# Patient Record
Sex: MALE | Race: WHITE | NOT HISPANIC OR LATINO | URBAN - METROPOLITAN AREA
[De-identification: names, ages, dates, MRNs, and addresses within clinical notes are randomized per-mention and may not be internally consistent; named-entity substitution may affect disease eponyms.]

---

## 2018-07-30 ENCOUNTER — EMERGENCY (EMERGENCY)
Facility: HOSPITAL | Age: 38
LOS: 0 days | Discharge: HOME | End: 2018-07-30
Attending: EMERGENCY MEDICINE | Admitting: EMERGENCY MEDICINE

## 2018-07-30 VITALS
RESPIRATION RATE: 17 BRPM | TEMPERATURE: 98 F | HEART RATE: 75 BPM | WEIGHT: 184.97 LBS | SYSTOLIC BLOOD PRESSURE: 128 MMHG | HEIGHT: 70 IN | OXYGEN SATURATION: 99 % | DIASTOLIC BLOOD PRESSURE: 89 MMHG

## 2018-07-30 DIAGNOSIS — Z09 ENCOUNTER FOR FOLLOW-UP EXAMINATION AFTER COMPLETED TREATMENT FOR CONDITIONS OTHER THAN MALIGNANT NEOPLASM: ICD-10-CM

## 2018-07-30 DIAGNOSIS — Z87.39 PERSONAL HISTORY OF OTHER DISEASES OF THE MUSCULOSKELETAL SYSTEM AND CONNECTIVE TISSUE: Chronic | ICD-10-CM

## 2018-07-30 DIAGNOSIS — Z79.899 OTHER LONG TERM (CURRENT) DRUG THERAPY: ICD-10-CM

## 2018-07-30 DIAGNOSIS — E10.9 TYPE 1 DIABETES MELLITUS WITHOUT COMPLICATIONS: ICD-10-CM

## 2018-07-30 DIAGNOSIS — L03.317 CELLULITIS OF BUTTOCK: ICD-10-CM

## 2018-07-30 RX ORDER — CEPHALEXIN 500 MG
1 CAPSULE ORAL
Qty: 40 | Refills: 0
Start: 2018-07-30 | End: 2018-08-08

## 2018-07-30 RX ORDER — AZTREONAM 2 G
1 VIAL (EA) INJECTION
Qty: 20 | Refills: 0
Start: 2018-07-30 | End: 2018-08-08

## 2018-07-30 NOTE — ED PROVIDER NOTE - OBJECTIVE STATEMENT
38 y/o M with PMH DM type 1 with insulin pump presents with redness/swelling/warmth/TTP  x days on R upper buttock at previous area of insulin pump insertion. Denies discharge, CP, SOB, abdominal pain, n/v/d, fevers, sweats, chills, trauma, fall, cough, recent travel, recent illness, sick contacts, leg pain/swelling, urinary symptoms. BG has been same as previous and there has been no break in his insulin use.

## 2018-07-30 NOTE — ED PROVIDER NOTE - ATTENDING CONTRIBUTION TO CARE
38 y/o M with PMH DM type 1 with insulin pump presents with redness/swelling/warmth/TTP  x days on R upper buttock. Patient states he had this after he kept his insulin pump inserted at the same area for a few days, no fever, no cp/sob, no n/v/d, no loc.     CONSTITUTIONAL: Well-developed; well-nourished; in no acute distress. Sitting up and providing appropriate history and physical examination  SKIN: + right superior buttocks 2 cm area of cellulitis, no specific abscess noted on bedside US, no fluctuance, + hard to the touch,  skin exam is warm and dry, no acute rash.  HEAD: Normocephalic; atraumatic.  EXT: Normal ROM. No clubbing, cyanosis or edema. Dp and Pt Pulses intact. Cap refill less than 3 seconds  NEURO: Alert, oriented, grossly unremarkable. No Focal deficits. GCS 15. NIH 0  PSYCH: Cooperative, appropriate.

## 2018-07-30 NOTE — ED PROVIDER NOTE - PROGRESS NOTE DETAILS
Counseled on red flags and to return for them. Counseled on importance of follow up. Patient repeats back instructions. Patient advised that they or their doctor may call [phone] to follow up on the specific results of the tests performed today in the emergency department.   Patient appears well on discharge.

## 2018-07-30 NOTE — ED PROVIDER NOTE - PHYSICAL EXAMINATION
PHYSICAL EXAM:    GENERAL: Alert, appears stated age, well appearing, non-toxic  SKIN: Warm, pink and dry. MMM. +R upper buttock with 2cm area of indurated erythema, swelling, warmth. no fluctuance. no discharge. no punctum. bedside sono reveals cobblestoning without discrete focal collection.   EYE: Normal lids/conjunctiva  ENT: Normal hearing, patent oropharynx  NECK: +supple. No meningismus  Pulm: Bilateral BS, normal resp effort, no wheezes, stridor, or retractions  CV: RRR, no M/R/G, 2+ pulses  Abd: soft, non-tender, non-distended  Neuro: AAOx3, . normal gait.

## 2018-07-30 NOTE — ED PROVIDER NOTE - NS ED ROS FT
Review of Systems    Constitutional: (-) fever  Cardiovascular: (-) chest pain, (-) syncope  Respiratory: (-) cough, (-) shortness of breath  Gastrointestinal: (-) vomiting, (-) diarrhea  Musculoskeletal: (-) neck pain, (-) back pain  Integumentary: see hpi   Neurological: (-) headache

## 2019-12-07 ENCOUNTER — INPATIENT (INPATIENT)
Facility: HOSPITAL | Age: 39
LOS: 5 days | Discharge: HOME HEALTH | End: 2019-12-13
Attending: INTERNAL MEDICINE | Admitting: INTERNAL MEDICINE
Payer: COMMERCIAL

## 2019-12-07 VITALS
DIASTOLIC BLOOD PRESSURE: 68 MMHG | HEART RATE: 137 BPM | SYSTOLIC BLOOD PRESSURE: 119 MMHG | TEMPERATURE: 102 F | OXYGEN SATURATION: 94 % | RESPIRATION RATE: 19 BRPM

## 2019-12-07 DIAGNOSIS — Z87.39 PERSONAL HISTORY OF OTHER DISEASES OF THE MUSCULOSKELETAL SYSTEM AND CONNECTIVE TISSUE: Chronic | ICD-10-CM

## 2019-12-07 PROBLEM — E10.9 TYPE 1 DIABETES MELLITUS WITHOUT COMPLICATIONS: Chronic | Status: ACTIVE | Noted: 2018-07-30

## 2019-12-07 LAB
ALBUMIN SERPL ELPH-MCNC: 3.9 G/DL — SIGNIFICANT CHANGE UP (ref 3.5–5.2)
ALP SERPL-CCNC: 66 U/L — SIGNIFICANT CHANGE UP (ref 30–115)
ALT FLD-CCNC: 28 U/L — SIGNIFICANT CHANGE UP (ref 0–41)
ANION GAP SERPL CALC-SCNC: 22 MMOL/L — HIGH (ref 7–14)
APPEARANCE UR: CLEAR — SIGNIFICANT CHANGE UP
APTT BLD: 31.3 SEC — SIGNIFICANT CHANGE UP (ref 27–39.2)
AST SERPL-CCNC: 25 U/L — SIGNIFICANT CHANGE UP (ref 0–41)
BACTERIA # UR AUTO: NEGATIVE — SIGNIFICANT CHANGE UP
BASE EXCESS BLDV CALC-SCNC: -0.8 MMOL/L — SIGNIFICANT CHANGE UP (ref -2–2)
BASOPHILS # BLD AUTO: 0.09 K/UL — SIGNIFICANT CHANGE UP (ref 0–0.2)
BASOPHILS NFR BLD AUTO: 0.5 % — SIGNIFICANT CHANGE UP (ref 0–1)
BILIRUB SERPL-MCNC: 1.5 MG/DL — HIGH (ref 0.2–1.2)
BILIRUB UR-MCNC: NEGATIVE — SIGNIFICANT CHANGE UP
BUN SERPL-MCNC: 24 MG/DL — HIGH (ref 10–20)
CA-I SERPL-SCNC: 1.18 MMOL/L — SIGNIFICANT CHANGE UP (ref 1.12–1.3)
CALCIUM SERPL-MCNC: 9.3 MG/DL — SIGNIFICANT CHANGE UP (ref 8.5–10.1)
CHLORIDE SERPL-SCNC: 96 MMOL/L — LOW (ref 98–110)
CO2 SERPL-SCNC: 17 MMOL/L — SIGNIFICANT CHANGE UP (ref 17–32)
COLOR SPEC: YELLOW — SIGNIFICANT CHANGE UP
CREAT SERPL-MCNC: 0.9 MG/DL — SIGNIFICANT CHANGE UP (ref 0.7–1.5)
DIFF PNL FLD: NEGATIVE — SIGNIFICANT CHANGE UP
EOSINOPHIL # BLD AUTO: 0 K/UL — SIGNIFICANT CHANGE UP (ref 0–0.7)
EOSINOPHIL NFR BLD AUTO: 0 % — SIGNIFICANT CHANGE UP (ref 0–8)
EPI CELLS # UR: 1 /HPF — SIGNIFICANT CHANGE UP (ref 0–5)
FLU A RESULT: NEGATIVE — SIGNIFICANT CHANGE UP
FLU A RESULT: NEGATIVE — SIGNIFICANT CHANGE UP
FLUAV AG NPH QL: NEGATIVE — SIGNIFICANT CHANGE UP
FLUBV AG NPH QL: NEGATIVE — SIGNIFICANT CHANGE UP
GAS PNL BLDV: 134 MMOL/L — LOW (ref 136–145)
GAS PNL BLDV: SIGNIFICANT CHANGE UP
GLUCOSE BLDC GLUCOMTR-MCNC: 218 MG/DL — HIGH (ref 70–99)
GLUCOSE BLDC GLUCOMTR-MCNC: 219 MG/DL — HIGH (ref 70–99)
GLUCOSE BLDC GLUCOMTR-MCNC: 257 MG/DL — HIGH (ref 70–99)
GLUCOSE SERPL-MCNC: 307 MG/DL — HIGH (ref 70–99)
GLUCOSE UR QL: ABNORMAL
HCO3 BLDV-SCNC: 22 MMOL/L — SIGNIFICANT CHANGE UP (ref 22–29)
HCT VFR BLD CALC: 42.1 % — SIGNIFICANT CHANGE UP (ref 42–52)
HCT VFR BLDA CALC: 48 % — HIGH (ref 34–44)
HGB BLD CALC-MCNC: 15.7 G/DL — SIGNIFICANT CHANGE UP (ref 14–18)
HGB BLD-MCNC: 14.9 G/DL — SIGNIFICANT CHANGE UP (ref 14–18)
HYALINE CASTS # UR AUTO: 1 /LPF — SIGNIFICANT CHANGE UP (ref 0–7)
IMM GRANULOCYTES NFR BLD AUTO: 0.8 % — HIGH (ref 0.1–0.3)
INR BLD: 1.39 RATIO — HIGH (ref 0.65–1.3)
KETONES UR-MCNC: ABNORMAL
LACTATE BLDV-MCNC: 1.8 MMOL/L — HIGH (ref 0.5–1.6)
LACTATE SERPL-SCNC: 1.3 MMOL/L — SIGNIFICANT CHANGE UP (ref 0.7–2)
LACTATE SERPL-SCNC: 1.8 MMOL/L — SIGNIFICANT CHANGE UP (ref 0.7–2)
LEUKOCYTE ESTERASE UR-ACNC: NEGATIVE — SIGNIFICANT CHANGE UP
LYMPHOCYTES # BLD AUTO: 0.77 K/UL — LOW (ref 1.2–3.4)
LYMPHOCYTES # BLD AUTO: 4 % — LOW (ref 20.5–51.1)
MCHC RBC-ENTMCNC: 29.6 PG — SIGNIFICANT CHANGE UP (ref 27–31)
MCHC RBC-ENTMCNC: 35.4 G/DL — SIGNIFICANT CHANGE UP (ref 32–37)
MCV RBC AUTO: 83.7 FL — SIGNIFICANT CHANGE UP (ref 80–94)
MONOCYTES # BLD AUTO: 1.16 K/UL — HIGH (ref 0.1–0.6)
MONOCYTES NFR BLD AUTO: 6 % — SIGNIFICANT CHANGE UP (ref 1.7–9.3)
NEUTROPHILS # BLD AUTO: 17.05 K/UL — HIGH (ref 1.4–6.5)
NEUTROPHILS NFR BLD AUTO: 88.7 % — HIGH (ref 42.2–75.2)
NITRITE UR-MCNC: NEGATIVE — SIGNIFICANT CHANGE UP
NRBC # BLD: 0 /100 WBCS — SIGNIFICANT CHANGE UP (ref 0–0)
PCO2 BLDV: 31 MMHG — LOW (ref 41–51)
PH BLDV: 7.46 — HIGH (ref 7.26–7.43)
PH UR: 6 — SIGNIFICANT CHANGE UP (ref 5–8)
PLATELET # BLD AUTO: 249 K/UL — SIGNIFICANT CHANGE UP (ref 130–400)
PO2 BLDV: 43 MMHG — HIGH (ref 20–40)
POTASSIUM BLDV-SCNC: 4 MMOL/L — SIGNIFICANT CHANGE UP (ref 3.3–5.6)
POTASSIUM SERPL-MCNC: 4.6 MMOL/L — SIGNIFICANT CHANGE UP (ref 3.5–5)
POTASSIUM SERPL-SCNC: 4.6 MMOL/L — SIGNIFICANT CHANGE UP (ref 3.5–5)
PROT SERPL-MCNC: 6.7 G/DL — SIGNIFICANT CHANGE UP (ref 6–8)
PROT UR-MCNC: ABNORMAL
PROTHROM AB SERPL-ACNC: 15.9 SEC — HIGH (ref 9.95–12.87)
RBC # BLD: 5.03 M/UL — SIGNIFICANT CHANGE UP (ref 4.7–6.1)
RBC # FLD: 12.7 % — SIGNIFICANT CHANGE UP (ref 11.5–14.5)
RBC CASTS # UR COMP ASSIST: 1 /HPF — SIGNIFICANT CHANGE UP (ref 0–4)
RSV RESULT: NEGATIVE — SIGNIFICANT CHANGE UP
RSV RNA RESP QL NAA+PROBE: NEGATIVE — SIGNIFICANT CHANGE UP
SAO2 % BLDV: 82 % — SIGNIFICANT CHANGE UP
SODIUM SERPL-SCNC: 135 MMOL/L — SIGNIFICANT CHANGE UP (ref 135–146)
SP GR SPEC: 1.04 — HIGH (ref 1.01–1.02)
UROBILINOGEN FLD QL: SIGNIFICANT CHANGE UP
WBC # BLD: 19.22 K/UL — HIGH (ref 4.8–10.8)
WBC # FLD AUTO: 19.22 K/UL — HIGH (ref 4.8–10.8)
WBC UR QL: 2 /HPF — SIGNIFICANT CHANGE UP (ref 0–5)

## 2019-12-07 PROCEDURE — 99285 EMERGENCY DEPT VISIT HI MDM: CPT

## 2019-12-07 PROCEDURE — 71046 X-RAY EXAM CHEST 2 VIEWS: CPT | Mod: 26

## 2019-12-07 PROCEDURE — 93010 ELECTROCARDIOGRAM REPORT: CPT

## 2019-12-07 RX ORDER — KETOROLAC TROMETHAMINE 30 MG/ML
15 SYRINGE (ML) INJECTION ONCE
Refills: 0 | Status: DISCONTINUED | OUTPATIENT
Start: 2019-12-07 | End: 2019-12-07

## 2019-12-07 RX ORDER — ACETAMINOPHEN 500 MG
650 TABLET ORAL EVERY 6 HOURS
Refills: 0 | Status: DISCONTINUED | OUTPATIENT
Start: 2019-12-07 | End: 2019-12-10

## 2019-12-07 RX ORDER — AZITHROMYCIN 500 MG/1
500 TABLET, FILM COATED ORAL ONCE
Refills: 0 | Status: COMPLETED | OUTPATIENT
Start: 2019-12-07 | End: 2019-12-07

## 2019-12-07 RX ORDER — AZITHROMYCIN 500 MG/1
500 TABLET, FILM COATED ORAL EVERY 24 HOURS
Refills: 0 | Status: DISCONTINUED | OUTPATIENT
Start: 2019-12-07 | End: 2019-12-09

## 2019-12-07 RX ORDER — CEFEPIME 1 G/1
2000 INJECTION, POWDER, FOR SOLUTION INTRAMUSCULAR; INTRAVENOUS ONCE
Refills: 0 | Status: COMPLETED | OUTPATIENT
Start: 2019-12-07 | End: 2019-12-07

## 2019-12-07 RX ORDER — ATORVASTATIN CALCIUM 80 MG/1
10 TABLET, FILM COATED ORAL AT BEDTIME
Refills: 0 | Status: DISCONTINUED | OUTPATIENT
Start: 2019-12-07 | End: 2019-12-13

## 2019-12-07 RX ORDER — SODIUM CHLORIDE 9 MG/ML
3000 INJECTION, SOLUTION INTRAVENOUS ONCE
Refills: 0 | Status: COMPLETED | OUTPATIENT
Start: 2019-12-07 | End: 2019-12-07

## 2019-12-07 RX ORDER — ACETAMINOPHEN 500 MG
650 TABLET ORAL ONCE
Refills: 0 | Status: DISCONTINUED | OUTPATIENT
Start: 2019-12-07 | End: 2019-12-07

## 2019-12-07 RX ORDER — ENOXAPARIN SODIUM 100 MG/ML
40 INJECTION SUBCUTANEOUS DAILY
Refills: 0 | Status: DISCONTINUED | OUTPATIENT
Start: 2019-12-07 | End: 2019-12-13

## 2019-12-07 RX ORDER — CEFTRIAXONE 500 MG/1
1000 INJECTION, POWDER, FOR SOLUTION INTRAMUSCULAR; INTRAVENOUS EVERY 24 HOURS
Refills: 0 | Status: DISCONTINUED | OUTPATIENT
Start: 2019-12-07 | End: 2019-12-09

## 2019-12-07 RX ORDER — IBUPROFEN 200 MG
400 TABLET ORAL ONCE
Refills: 0 | Status: COMPLETED | OUTPATIENT
Start: 2019-12-07 | End: 2019-12-08

## 2019-12-07 RX ADMIN — AZITHROMYCIN 255 MILLIGRAM(S): 500 TABLET, FILM COATED ORAL at 11:15

## 2019-12-07 RX ADMIN — Medication 650 MILLIGRAM(S): at 23:52

## 2019-12-07 RX ADMIN — CEFEPIME 100 MILLIGRAM(S): 1 INJECTION, POWDER, FOR SOLUTION INTRAMUSCULAR; INTRAVENOUS at 12:03

## 2019-12-07 RX ADMIN — Medication 15 MILLIGRAM(S): at 13:19

## 2019-12-07 RX ADMIN — Medication 650 MILLIGRAM(S): at 22:39

## 2019-12-07 RX ADMIN — SODIUM CHLORIDE 3000 MILLILITER(S): 9 INJECTION, SOLUTION INTRAVENOUS at 13:47

## 2019-12-07 RX ADMIN — CEFTRIAXONE 100 MILLIGRAM(S): 500 INJECTION, POWDER, FOR SOLUTION INTRAMUSCULAR; INTRAVENOUS at 21:38

## 2019-12-07 RX ADMIN — SODIUM CHLORIDE 3000 MILLILITER(S): 9 INJECTION, SOLUTION INTRAVENOUS at 11:15

## 2019-12-07 RX ADMIN — CEFEPIME 2000 MILLIGRAM(S): 1 INJECTION, POWDER, FOR SOLUTION INTRAMUSCULAR; INTRAVENOUS at 13:04

## 2019-12-07 RX ADMIN — AZITHROMYCIN 500 MILLIGRAM(S): 500 TABLET, FILM COATED ORAL at 12:00

## 2019-12-07 NOTE — ED PROVIDER NOTE - OBJECTIVE STATEMENT
39 y.o. M with PMH of DM type 1 on insulin with fever tmax of 102, 2 weeks of cough shortness of breath not improved after levaquin has worsened since then. 39 y.o. M with PMH of DM type 1 on insulin with fever tmax of 102, 2 weeks of non productive cough without shortness of breath not improved after levaquin has worsened since then. no NVD no abdominal pain, no urinary symptoms, no CP. Pt was in urgent care today and they sent him in for further workup.

## 2019-12-07 NOTE — H&P ADULT - ASSESSMENT
40 y/o m w/ pmhx of type 1 dm, hld presents with ~ 3 weeks of productive cough and fever. Pt started to get productive cough with clear to yellow sputum, associated with intermittent fever about 3 weeks ago and went to urgent care on 11/23 where he was given Levaquin and steroids. Pt condition improved after that for a few days but then he started to get similar symptoms again so he went to urgent care again today and he was told that he has pneumonia and he should come to ER.    # Fever with cough and elevated WBCs likely PNA  - CXR did not show clear opacity  - check UA, pan cultures,   - consider pulm eval and CT scan if no improvement  - s/p IV fluid, Cefepime and Azithromycin. Start Rocephin and c/w azithro for 5 days  - f/u urine strep and legionella Ag. Sputum Cx    # DM Type 1  - c/w insulin basal/bolus and correctional scale.  - monitor finger stick AC+ HS.    # DLD  - c/w statins    DVT PPx:  Lovenox 40 mg s/c  GI PPX:  not indicated  Diet: DASH/Carb consistent  Activity: Increase as tolerated  Dispo: from home, no needs  Code Status: full code 38 y/o m w/ pmhx of type 1 dm, hld presents with ~ 3 weeks of productive cough and fever. Pt started to get productive cough with clear to yellow sputum, associated with intermittent fever about 3 weeks ago and went to urgent care on 11/23 where he was given Levaquin and steroids. Pt condition improved after that for a few days but then he started to get similar symptoms again so he went to urgent care again today and he was told that he has pneumonia and he should come to ER.    # Fever with productive cough and elevated WBCs likely PNA  - CXR did not show clear opacity  - check UA, pan cultures,   - consider pulm eval and CT scan if no improvement  - s/p IV fluid, Cefepime and Azithromycin. Start Rocephin and c/w Azithro for 5 days  - f/u urine strep and legionella Ag. Sputum Cx  - consider hem/onc consult if no improvement in WBCs, as pt has weight loss.  - will check for HIV.    # Sinus tachycardia likely from fever  - check TSH as pt has weight loss.     # DM Type 1  - Pt has insulin pump and wants to continue using it.  - monitor finger stick AC+ HS.    # DLD  - c/w statins    DVT PPx:  Lovenox 40 mg s/c  GI PPX:  not indicated  Diet: DASH/Carb consistent  Activity: Increase as tolerated  Dispo: from home, no needs  Code Status: full code

## 2019-12-07 NOTE — H&P ADULT - NSHPLABSRESULTS_GEN_ALL_CORE
14.9   19.22 )-----------( 249      ( 07 Dec 2019 10:52 )             42.1       12-07    135  |  96<L>  |  24<H>  ----------------------------<  307<H>  4.6   |  17  |  0.9    Ca    9.3      07 Dec 2019 10:52    TPro  6.7  /  Alb  3.9  /  TBili  1.5<H>  /  DBili  x   /  AST  25  /  ALT  28  /  AlkPhos  66  12-07                  PT/INR - ( 07 Dec 2019 10:52 )   PT: 15.90 sec;   INR: 1.39 ratio         PTT - ( 07 Dec 2019 10:52 )  PTT:31.3 sec    Lactate Trend  12-07 @ 10:52 Lactate:1.8             CAPILLARY BLOOD GLUCOSE            < from: Xray Chest 2 Views PA/Lat (12.07.19 @ 11:34) >    Impression:    Support devices: None.    Cardiac/mediastinum/hilum: Unremarkable.    Lung parenchyma/Pleura: Retrocardiac linear opacity, likely atelectasis.   Right lung is unremarkable. No evidence of pneumothorax.    Skeleton/soft tissues: Stable    < end of copied text >

## 2019-12-07 NOTE — ED PROVIDER NOTE - PHYSICAL EXAMINATION
CONSTITUTIONAL: Well-developed; well-nourished; in no acute distress.   SKIN: warm, dry  HEAD: Normocephalic; atraumatic.  EYES: PERRL, EOMI, no conjunctival erythema  ENT: No nasal discharge; airway clear.  NECK: Supple; non tender.  CARD: S1, S2 normal; no murmurs, gallops, or rubs. Regular rate and rhythm.   RESP: No wheezes, + LLL rales and rhonchi.  ABD: soft ntnd  EXT: Normal ROM.  No clubbing, cyanosis or edema.   LYMPH: No acute cervical adenopathy.  NEURO: Alert, oriented, grossly unremarkable  PSYCH: Cooperative, appropriate.

## 2019-12-07 NOTE — ED ADULT NURSE NOTE - NSFALLRSKASSESSTYPE_ED_ALL_ED
Measures to control urgency and frequency:    1. avoiding bladder irritants  - Discussed bladder irritants include coffe (even decaf), tea, alcohol, soda, spicy foods, acidic juices (orange, tomato), vinegar, and artificial sweeteners/sugary beverages.    2. timed voiding - go by the clock even if dont feel like you have to (every 2 hours maximum if even making it that long - otherwise try 90 minutes, and increase interval as tolerated), as well as when you need to go     3. No postponing voiding - dont hold it! Go when the need comes on    4. No fluids 2 hours before bed    5. Urinate just before bed     Continue myrbetriq    Bowel regimen - improve - goal: soft daily BM without pushin  - miralax daily - continue  - prunes - continue eating daily  CAN ADD any or all of  - stool softener capsule - 1-2x/day  - metamucil - 1 dose per day (or other fiber supplement)       Initial (On Arrival)

## 2019-12-07 NOTE — H&P ADULT - NSHPPHYSICALEXAM_GEN_ALL_CORE
PHYSICAL EXAM:  GENERAL: NAD, lying in bed comfortably  HEAD:  Atraumatic, Normocephalic  EYES: EOMI, PERRLA, conjunctiva and sclera clear  ENT: Moist mucous membranes  NECK: Supple, No JVD  CHEST/LUNG: Clear to auscultation bilaterally; No rales, rhonchi, wheezing, or rubs. Unlabored respirations  HEART: Regular rate and rhythm; No murmurs, rubs, or gallops  ABDOMEN: Bowel sounds present; Soft, Nontender, Nondistended. No hepatomegaly  EXTREMITIES:  2+ Peripheral Pulses, brisk capillary refill. No clubbing, cyanosis, or edema  NERVOUS SYSTEM:  Alert & Oriented X3, speech clear. No deficits   MSK: FROM all 4 extremities, full and equal strength  SKIN: No rashes or lesions PHYSICAL EXAM:  GENERAL: NAD, lying in bed comfortably  HEAD:  Atraumatic, Normocephalic  EYES: EOMI, PERRLA, conjunctiva and sclera clear  ENT: Moist mucous membranes  NECK: Supple, No JVD  CHEST/LUNG: Clear to auscultation bilaterally; No rales, rhonchi, wheezing, or rubs. Unlabored respirations  HEART: Regular rate and rhythm; No murmurs, rubs, or gallops  ABDOMEN: Bowel sounds present; Soft, Nontender, Nondistended. No hepatomegaly  EXTREMITIES:  + Peripheral Pulses, brisk capillary refill. No clubbing, cyanosis, or edema  NERVOUS SYSTEM:  Alert & Oriented X3, speech clear. No deficits   MSK: FROM all 4 extremities, full and equal strength  SKIN: No rashes or lesions

## 2019-12-07 NOTE — H&P ADULT - NSHPSOCIALHISTORY_GEN_ALL_CORE
denied hx of smoking cigarettes, drinks alcohol socially once or twice a week. Denied hx of recreational drug use.

## 2019-12-07 NOTE — H&P ADULT - HISTORY OF PRESENT ILLNESS
38 y/o m w/ pmhx of type 1 dm, hld presents with ~ 3 weeks of productive cough and fever. Pt started to get productive cough with clear to yellow sputum, associated with intermittent fever about 3 weeks ago and went to urgent care on 11/23 where he was given Levaquin and steroids. Pt condition improved after that for a few days but then he started to get similar symptoms again so he went to urgent care again today and he was told that he has pneumonia and he should come to ER. Pt states that he has productive cough associated with generalized body aches and fever. The rest of his family members also have cough but his symptoms are worse. Pt denied any hx of fever, chills, nausea, vomiting, diarrhea, constipation, melena, pain in abdomen, sob, chest pain, increased urinary frequency, dysuria, headache, lightheadedness, dizziness, vertigo, localized weakness or numbness/tingling.    In the ER pt was found to have fever with T max of 102 F, HR of 137, /68 mmHg, RR : 19, SPO2 94% on RA. 38 y/o m w/ pmhx of type 1 dm, hld presents with ~ 3 weeks of productive cough and fever. Pt started to get productive cough with clear to yellow sputum, associated with intermittent fever about 3 weeks ago and went to urgent care on 11/23 where he was given Levaquin and steroids. Pt condition improved after that for a few days but then he started to get similar symptoms again so he went to urgent care again today and he was told that he has pneumonia and he should come to ER. Pt states that he has productive cough associated with generalized body aches and fever. The rest of his family members also have cough but his symptoms are worse. Pt has lost about 7 pounds in the last few weeks. Pt denied any hx of fever, chills, nausea, vomiting, diarrhea, constipation, melena, pain in abdomen, sob, chest pain, increased urinary frequency, dysuria, headache, lightheadedness, dizziness, vertigo, localized weakness or numbness/tingling.    In the ER pt was found to have fever with T max of 102 F, HR of 137, /68 mmHg, RR : 19, SPO2 94% on RA.

## 2019-12-07 NOTE — ED PROVIDER NOTE - ATTENDING CONTRIBUTION TO CARE
38 y/o m w/ pmhx of dm, hld presents with ~ 3 weeks of productive cough, clear to yellow, associated with intermittent fever, tmax of 102, chills, nausea, post-tussive vomiting, no hemoptysis, and sob. (+) sick contacts at home with cough but not as severe as him. pt went to urgent care on 11/23 and was given Levaquin and steroids, felt better for a little then symptoms returned, went back today and was told he had PNA and to come to ed. never obtained flu shot. ex-smoker. No cp,  pleuritic cp,  palpitations, diaphoresis,  ha/lh/dizziness, numbness/tingling, neck pain/ stiffness, abd pain, diarrhea, constipation, melena/brbpr, urinary symptoms, trauma, weakness, edema, calf pain/swelling/erythema,recent travel or rash.  Vital Signs: I have reviewed the initial vital signs. Constitutional: Male pt sitting on stretcher speaking full sentences with dry raspy cough. Integumentary: No rash. ENT: Dry MM NECK: Supple, non-tender, no meningeal signs. Cardiovascular: Tachycardiac, radial pulses 2/4 b/l. No JVD. Respiratory: (+) Congested. BS present b/l, decreased breath sounds to lower lung bases worse to R lower lung base, poor resp effort and excursion, poor air exchange, no accessory muscle use, no stridor. Gastrointestinal: BS present throughout all 4 quadrants, soft, nd, nt, no rebound tenderness or guarding, no cvat. Musculoskeletal: FROM, no edema, no calf pain/swelling/erythema. Neurologic: AAOx3, motor 5/5 and sensation intact throughout upper and lower ext, CN II-XII intact, No facial droop or slurring of speech. No focal deficits.

## 2019-12-07 NOTE — ED PROVIDER NOTE - CLINICAL SUMMARY MEDICAL DECISION MAKING FREE TEXT BOX
pt aware of all labs and imaging, code sepsis, ivf and abx given s/p cultures obtained, cxr with PNA, aware of plan for admission, medical admitting team aware of pt and admission.

## 2019-12-07 NOTE — ED PROVIDER NOTE - CARE PLAN
Assessment and plan of treatment:	Plan: CODE SEPSIS, EKG, CXR, labs, ivf, abx, urine, reassess. Principal Discharge DX:	Sepsis  Assessment and plan of treatment:	Plan: CODE SEPSIS, EKG, CXR, labs, ivf, abx, urine, reassess.  Secondary Diagnosis:	Pneumonia

## 2019-12-07 NOTE — ED PROVIDER NOTE - PROGRESS NOTE DETAILS
Code sepsis suspected at this time 30 cc/kg fluids IVABx pt aware of all labs and imaging, PNA noted, iv abx were given, no resp distress, aware of plan for admission and agrees.

## 2019-12-07 NOTE — ED ADULT TRIAGE NOTE - CHIEF COMPLAINT QUOTE
c/o fever, productive cough, recent dx of pna x 1 week ago, finished PO abx and prenisone, symptoms returned

## 2019-12-08 LAB
ALBUMIN SERPL ELPH-MCNC: 3.3 G/DL — LOW (ref 3.5–5.2)
ALP SERPL-CCNC: 59 U/L — SIGNIFICANT CHANGE UP (ref 30–115)
ALT FLD-CCNC: 19 U/L — SIGNIFICANT CHANGE UP (ref 0–41)
ANION GAP SERPL CALC-SCNC: 12 MMOL/L — SIGNIFICANT CHANGE UP (ref 7–14)
AST SERPL-CCNC: 15 U/L — SIGNIFICANT CHANGE UP (ref 0–41)
BASOPHILS # BLD AUTO: 0.07 K/UL — SIGNIFICANT CHANGE UP (ref 0–0.2)
BASOPHILS NFR BLD AUTO: 0.5 % — SIGNIFICANT CHANGE UP (ref 0–1)
BILIRUB SERPL-MCNC: 0.8 MG/DL — SIGNIFICANT CHANGE UP (ref 0.2–1.2)
BUN SERPL-MCNC: 17 MG/DL — SIGNIFICANT CHANGE UP (ref 10–20)
CALCIUM SERPL-MCNC: 8.7 MG/DL — SIGNIFICANT CHANGE UP (ref 8.5–10.1)
CHLORIDE SERPL-SCNC: 104 MMOL/L — SIGNIFICANT CHANGE UP (ref 98–110)
CO2 SERPL-SCNC: 26 MMOL/L — SIGNIFICANT CHANGE UP (ref 17–32)
CREAT SERPL-MCNC: 0.9 MG/DL — SIGNIFICANT CHANGE UP (ref 0.7–1.5)
EOSINOPHIL # BLD AUTO: 0.04 K/UL — SIGNIFICANT CHANGE UP (ref 0–0.7)
EOSINOPHIL NFR BLD AUTO: 0.3 % — SIGNIFICANT CHANGE UP (ref 0–8)
GLUCOSE BLDC GLUCOMTR-MCNC: 120 MG/DL — HIGH (ref 70–99)
GLUCOSE BLDC GLUCOMTR-MCNC: 188 MG/DL — HIGH (ref 70–99)
GLUCOSE BLDC GLUCOMTR-MCNC: 199 MG/DL — HIGH (ref 70–99)
GLUCOSE BLDC GLUCOMTR-MCNC: 206 MG/DL — HIGH (ref 70–99)
GLUCOSE SERPL-MCNC: 134 MG/DL — HIGH (ref 70–99)
GRAM STN FLD: SIGNIFICANT CHANGE UP
HCT VFR BLD CALC: 37.5 % — LOW (ref 42–52)
HGB BLD-MCNC: 12.9 G/DL — LOW (ref 14–18)
HIV 1+2 AB+HIV1 P24 AG SERPL QL IA: SIGNIFICANT CHANGE UP
IMM GRANULOCYTES NFR BLD AUTO: 1.1 % — HIGH (ref 0.1–0.3)
LEGIONELLA AG UR QL: NEGATIVE — SIGNIFICANT CHANGE UP
LYMPHOCYTES # BLD AUTO: 1.71 K/UL — SIGNIFICANT CHANGE UP (ref 1.2–3.4)
LYMPHOCYTES # BLD AUTO: 12.3 % — LOW (ref 20.5–51.1)
MAGNESIUM SERPL-MCNC: 1.8 MG/DL — SIGNIFICANT CHANGE UP (ref 1.8–2.4)
MCHC RBC-ENTMCNC: 29.3 PG — SIGNIFICANT CHANGE UP (ref 27–31)
MCHC RBC-ENTMCNC: 34.4 G/DL — SIGNIFICANT CHANGE UP (ref 32–37)
MCV RBC AUTO: 85 FL — SIGNIFICANT CHANGE UP (ref 80–94)
MONOCYTES # BLD AUTO: 1.22 K/UL — HIGH (ref 0.1–0.6)
MONOCYTES NFR BLD AUTO: 8.8 % — SIGNIFICANT CHANGE UP (ref 1.7–9.3)
NEUTROPHILS # BLD AUTO: 10.72 K/UL — HIGH (ref 1.4–6.5)
NEUTROPHILS NFR BLD AUTO: 77 % — HIGH (ref 42.2–75.2)
NRBC # BLD: 0 /100 WBCS — SIGNIFICANT CHANGE UP (ref 0–0)
PLATELET # BLD AUTO: 214 K/UL — SIGNIFICANT CHANGE UP (ref 130–400)
POTASSIUM SERPL-MCNC: 4.5 MMOL/L — SIGNIFICANT CHANGE UP (ref 3.5–5)
POTASSIUM SERPL-SCNC: 4.5 MMOL/L — SIGNIFICANT CHANGE UP (ref 3.5–5)
PROT SERPL-MCNC: 5.7 G/DL — LOW (ref 6–8)
RBC # BLD: 4.41 M/UL — LOW (ref 4.7–6.1)
RBC # FLD: 12.6 % — SIGNIFICANT CHANGE UP (ref 11.5–14.5)
SODIUM SERPL-SCNC: 142 MMOL/L — SIGNIFICANT CHANGE UP (ref 135–146)
SPECIMEN SOURCE: SIGNIFICANT CHANGE UP
TSH SERPL-MCNC: 0.3 UIU/ML — SIGNIFICANT CHANGE UP (ref 0.27–4.2)
WBC # BLD: 13.92 K/UL — HIGH (ref 4.8–10.8)
WBC # FLD AUTO: 13.92 K/UL — HIGH (ref 4.8–10.8)

## 2019-12-08 RX ORDER — BENZOCAINE AND MENTHOL 5; 1 G/100ML; G/100ML
1 LIQUID ORAL
Refills: 0 | Status: DISCONTINUED | OUTPATIENT
Start: 2019-12-08 | End: 2019-12-13

## 2019-12-08 RX ORDER — IBUPROFEN 200 MG
600 TABLET ORAL ONCE
Refills: 0 | Status: COMPLETED | OUTPATIENT
Start: 2019-12-08 | End: 2019-12-08

## 2019-12-08 RX ADMIN — Medication 100 MILLIGRAM(S): at 18:32

## 2019-12-08 RX ADMIN — Medication 100 MILLIGRAM(S): at 10:34

## 2019-12-08 RX ADMIN — ATORVASTATIN CALCIUM 10 MILLIGRAM(S): 80 TABLET, FILM COATED ORAL at 06:04

## 2019-12-08 RX ADMIN — Medication 650 MILLIGRAM(S): at 18:45

## 2019-12-08 RX ADMIN — BENZOCAINE AND MENTHOL 1 LOZENGE: 5; 1 LIQUID ORAL at 18:31

## 2019-12-08 RX ADMIN — Medication 400 MILLIGRAM(S): at 00:00

## 2019-12-08 RX ADMIN — AZITHROMYCIN 255 MILLIGRAM(S): 500 TABLET, FILM COATED ORAL at 11:40

## 2019-12-08 RX ADMIN — CEFTRIAXONE 100 MILLIGRAM(S): 500 INJECTION, POWDER, FOR SOLUTION INTRAMUSCULAR; INTRAVENOUS at 21:07

## 2019-12-08 RX ADMIN — Medication 400 MILLIGRAM(S): at 02:00

## 2019-12-08 RX ADMIN — Medication 100 MILLIGRAM(S): at 00:00

## 2019-12-08 RX ADMIN — Medication 600 MILLIGRAM(S): at 11:03

## 2019-12-08 NOTE — PROGRESS NOTE ADULT - ASSESSMENT
Prior Authorization Retail Medication Request    Medication/Dose:   ICD code (if different than what is on RX):  MIGRAINE HEAD ACHE  Previously Tried and Failed: SEE CHART Insurance Name: [employer]  Insurance ID: [id number]        Pharmacy Information (if different than what is on RX)  Name:  [facility]  Phone:  [phone]     38 y/o m w/ pmhx of type 1 dm, hld presents with ~ 3 weeks of productive cough and fever. Pt started to get productive cough with clear to yellow sputum, associated with intermittent fever about 3 weeks ago and went to urgent care on 11/23 where he was given Levaquin and steroids. Pt condition improved after that for a few days but then he started to get similar symptoms again so he went to urgent care again today and he was told that he has pneumonia and he should come to ER.    # Sepsis Fever with productive cough and elevated WBCs likely PNA  - CXR did not show clear opacity  - check UA, pan cultures,   - pulm eval and ID  - s/p IV fluid, Cefepime and Azithromycin. Start Rocephin and c/w Azithro for 5 days  - f/u urine strep and legionella Ag. Sputum Cx  - consider hem/onc consult if no improvement in WBCs, as pt has weight loss.  - will check for HIV.    # Diabetes Mellitus     - monitor blood sugar   - try to keep under 200

## 2019-12-08 NOTE — PROGRESS NOTE ADULT - SUBJECTIVE AND OBJECTIVE BOX
40 y/o m w/ pmhx of type 1 dm, hld presents with ~ 3 weeks of productive cough and fever. Pt started to get productive cough with clear to yellow sputum, associated with intermittent fever about 3 weeks ago and went to urgent care on 11/23 where he was given Levaquin and steroids. Pt condition improved after that for a few days but then he started to get similar symptoms again so he went to urgent care again today and he was told that he has pneumonia and he should come to ER. Pt states that he has productive cough associated with generalized body aches and fever. The rest of his family members also have cough but his symptoms are worse. Pt has lost about 7 pounds in the last few weeks. Pt denied any hx of fever, chills, nausea, vomiting, diarrhea, constipation, melena, pain in abdomen, sob, chest pain, increased urinary frequency, dysuria, headache, lightheadedness, dizziness, vertigo, localized weakness or numbness/tingling.    In the ER pt was found to have fever with T max of 102 F, HR of 137, /68 mmHg, RR : 19, SPO2 94% on RA.     PAST MEDICAL & SURGICAL HISTORY:  Diabetes, type I  History of trigger finger    MEDICATIONS  (STANDING):  atorvastatin 10 milliGRAM(s) Oral at bedtime  azithromycin  IVPB 500 milliGRAM(s) IV Intermittent every 24 hours  cefTRIAXone   IVPB 1000 milliGRAM(s) IV Intermittent every 24 hours  enoxaparin Injectable 40 milliGRAM(s) SubCutaneous daily    MEDICATIONS  (PRN):  acetaminophen   Tablet .. 650 milliGRAM(s) Oral every 6 hours PRN Temp greater or equal to 38C (100.4F), Mild Pain (1 - 3)  guaiFENesin   Syrup  (Sugar-Free) 100 milliGRAM(s) Oral every 6 hours PRN Cough    Vital Signs Last 24 Hrs  T(C): 36.4 (08 Dec 2019 04:38), Max: 39.4 (07 Dec 2019 23:52)  T(F): 97.6 (08 Dec 2019 04:38), Max: 103 (07 Dec 2019 23:52)  HR: 94 (08 Dec 2019 04:38) (94 - 137)  BP: 96/54 (08 Dec 2019 04:38) (96/54 - 132/74)  BP(mean): --  RR: 18 (08 Dec 2019 04:38) (18 - 19)  SpO2: 96% (08 Dec 2019 03:07) (91% - 99%)    CAPILLARY BLOOD GLUCOSE      POCT Blood Glucose.: 218 mg/dL (07 Dec 2019 21:23)  POCT Blood Glucose.: 257 mg/dL (07 Dec 2019 17:31)  POCT Blood Glucose.: 219 mg/dL (07 Dec 2019 14:49)     PHYSICAL EXAM:  	GENERAL: NAD, lying in bed comfortably  	HEAD:  Atraumatic, Normocephalic  	EYES: EOMI, PERRLA, conjunctiva and sclera clear  	ENT: Moist mucous membranes  	NECK: Supple, No JVD  	CHEST/LUNG: Clear to auscultation bilaterally; No rales, rhonchi, wheezing, or rubs. Unlabored respirations  	HEART: Regular rate and rhythm; No murmurs, rubs, or gallops  	ABDOMEN: Bowel sounds present; Soft, Nontender, Nondistended. No hepatomegaly  	EXTREMITIES:  + Peripheral Pulses, brisk capillary refill. No clubbing, cyanosis, or edema  	NERVOUS SYSTEM:  Alert & Oriented X3, speech clear. No deficits   	MSK: FROM all 4 extremities, full and equal strength  SKIN: No rashes or lesions                          14.9   19.22 )-----------( 249      ( 07 Dec 2019 10:52 )             42.1   12-07    135  |  96<L>  |  24<H>  ----------------------------<  307<H>  4.6   |  17  |  0.9    Ca    9.3      07 Dec 2019 10:52    TPro  6.7  /  Alb  3.9  /  TBili  1.5<H>  /  DBili  x   /  AST  25  /  ALT  28  /  AlkPhos  66  12-07      EXAM:  XR CHEST PA LAT 2V            PROCEDURE DATE:  12/07/2019            INTERPRETATION:  Clinical History / Reason for exam: Fever    Comparison : Chest radiograph 5/6/2010.    Technique/Positioning: Frontal and lateral views.    Impression:    Support devices: None.    Cardiac/mediastinum/hilum: Unremarkable.    Lung parenchyma/Pleura: Retrocardiac linear opacity, likely atelectasis.   Right lung is unremarkable. No evidence of pneumothorax.    Skeleton/soft tissues: Stable                    VIJAY COVARRUBIAS M.D., ATTENDING RADIOLOGIST  This document has been electronically signed. Dec  7 2019  1:22PM

## 2019-12-09 LAB
ANION GAP SERPL CALC-SCNC: 17 MMOL/L — HIGH (ref 7–14)
BUN SERPL-MCNC: 13 MG/DL — SIGNIFICANT CHANGE UP (ref 10–20)
CALCIUM SERPL-MCNC: 8.5 MG/DL — SIGNIFICANT CHANGE UP (ref 8.5–10.1)
CHLORIDE SERPL-SCNC: 100 MMOL/L — SIGNIFICANT CHANGE UP (ref 98–110)
CO2 SERPL-SCNC: 24 MMOL/L — SIGNIFICANT CHANGE UP (ref 17–32)
CREAT SERPL-MCNC: 0.9 MG/DL — SIGNIFICANT CHANGE UP (ref 0.7–1.5)
CULTURE RESULTS: NO GROWTH — SIGNIFICANT CHANGE UP
CULTURE RESULTS: SIGNIFICANT CHANGE UP
GLUCOSE BLDC GLUCOMTR-MCNC: 176 MG/DL — HIGH (ref 70–99)
GLUCOSE BLDC GLUCOMTR-MCNC: 181 MG/DL — HIGH (ref 70–99)
GLUCOSE BLDC GLUCOMTR-MCNC: 216 MG/DL — HIGH (ref 70–99)
GLUCOSE BLDC GLUCOMTR-MCNC: 257 MG/DL — HIGH (ref 70–99)
GLUCOSE SERPL-MCNC: 171 MG/DL — HIGH (ref 70–99)
HCT VFR BLD CALC: 37.4 % — LOW (ref 42–52)
HGB BLD-MCNC: 12.8 G/DL — LOW (ref 14–18)
MCHC RBC-ENTMCNC: 29 PG — SIGNIFICANT CHANGE UP (ref 27–31)
MCHC RBC-ENTMCNC: 34.2 G/DL — SIGNIFICANT CHANGE UP (ref 32–37)
MCV RBC AUTO: 84.8 FL — SIGNIFICANT CHANGE UP (ref 80–94)
NRBC # BLD: 0 /100 WBCS — SIGNIFICANT CHANGE UP (ref 0–0)
PLATELET # BLD AUTO: 245 K/UL — SIGNIFICANT CHANGE UP (ref 130–400)
POTASSIUM SERPL-MCNC: 4.2 MMOL/L — SIGNIFICANT CHANGE UP (ref 3.5–5)
POTASSIUM SERPL-SCNC: 4.2 MMOL/L — SIGNIFICANT CHANGE UP (ref 3.5–5)
RBC # BLD: 4.41 M/UL — LOW (ref 4.7–6.1)
RBC # FLD: 12.6 % — SIGNIFICANT CHANGE UP (ref 11.5–14.5)
S PNEUM AG UR QL: NEGATIVE — SIGNIFICANT CHANGE UP
SODIUM SERPL-SCNC: 141 MMOL/L — SIGNIFICANT CHANGE UP (ref 135–146)
SPECIMEN SOURCE: SIGNIFICANT CHANGE UP
SPECIMEN SOURCE: SIGNIFICANT CHANGE UP
TSH SERPL-MCNC: 1.7 UIU/ML — SIGNIFICANT CHANGE UP (ref 0.27–4.2)
WBC # BLD: 10.85 K/UL — HIGH (ref 4.8–10.8)
WBC # FLD AUTO: 10.85 K/UL — HIGH (ref 4.8–10.8)

## 2019-12-09 PROCEDURE — 71250 CT THORAX DX C-: CPT | Mod: 26

## 2019-12-09 RX ORDER — VANCOMYCIN HCL 1 G
1750 VIAL (EA) INTRAVENOUS EVERY 12 HOURS
Refills: 0 | Status: DISCONTINUED | OUTPATIENT
Start: 2019-12-10 | End: 2019-12-10

## 2019-12-09 RX ORDER — ALBUTEROL 90 UG/1
1 AEROSOL, METERED ORAL EVERY 4 HOURS
Refills: 0 | Status: DISCONTINUED | OUTPATIENT
Start: 2019-12-09 | End: 2019-12-13

## 2019-12-09 RX ORDER — VANCOMYCIN HCL 1 G
1750 VIAL (EA) INTRAVENOUS ONCE
Refills: 0 | Status: COMPLETED | OUTPATIENT
Start: 2019-12-09 | End: 2019-12-09

## 2019-12-09 RX ORDER — CHLORHEXIDINE GLUCONATE 213 G/1000ML
1 SOLUTION TOPICAL
Refills: 0 | Status: DISCONTINUED | OUTPATIENT
Start: 2019-12-09 | End: 2019-12-13

## 2019-12-09 RX ORDER — TIOTROPIUM BROMIDE 18 UG/1
1 CAPSULE ORAL; RESPIRATORY (INHALATION) DAILY
Refills: 0 | Status: DISCONTINUED | OUTPATIENT
Start: 2019-12-09 | End: 2019-12-13

## 2019-12-09 RX ORDER — VANCOMYCIN HCL 1 G
VIAL (EA) INTRAVENOUS
Refills: 0 | Status: DISCONTINUED | OUTPATIENT
Start: 2019-12-09 | End: 2019-12-10

## 2019-12-09 RX ORDER — MEROPENEM 1 G/30ML
1000 INJECTION INTRAVENOUS EVERY 8 HOURS
Refills: 0 | Status: DISCONTINUED | OUTPATIENT
Start: 2019-12-09 | End: 2019-12-10

## 2019-12-09 RX ORDER — IPRATROPIUM/ALBUTEROL SULFATE 18-103MCG
3 AEROSOL WITH ADAPTER (GRAM) INHALATION EVERY 6 HOURS
Refills: 0 | Status: DISCONTINUED | OUTPATIENT
Start: 2019-12-09 | End: 2019-12-13

## 2019-12-09 RX ORDER — IPRATROPIUM/ALBUTEROL SULFATE 18-103MCG
3 AEROSOL WITH ADAPTER (GRAM) INHALATION ONCE
Refills: 0 | Status: COMPLETED | OUTPATIENT
Start: 2019-12-09 | End: 2019-12-09

## 2019-12-09 RX ADMIN — Medication 100 MILLIGRAM(S): at 22:10

## 2019-12-09 RX ADMIN — BENZOCAINE AND MENTHOL 1 LOZENGE: 5; 1 LIQUID ORAL at 18:00

## 2019-12-09 RX ADMIN — Medication 650 MILLIGRAM(S): at 12:30

## 2019-12-09 RX ADMIN — AZITHROMYCIN 255 MILLIGRAM(S): 500 TABLET, FILM COATED ORAL at 12:31

## 2019-12-09 RX ADMIN — Medication 3 MILLILITER(S): at 20:30

## 2019-12-09 RX ADMIN — Medication 100 MILLIGRAM(S): at 10:36

## 2019-12-09 RX ADMIN — BENZOCAINE AND MENTHOL 1 LOZENGE: 5; 1 LIQUID ORAL at 06:08

## 2019-12-09 RX ADMIN — ATORVASTATIN CALCIUM 10 MILLIGRAM(S): 80 TABLET, FILM COATED ORAL at 06:07

## 2019-12-09 RX ADMIN — Medication 100 MILLIGRAM(S): at 01:02

## 2019-12-09 RX ADMIN — Medication 650 MILLIGRAM(S): at 20:32

## 2019-12-09 RX ADMIN — Medication 3 MILLILITER(S): at 09:24

## 2019-12-09 RX ADMIN — MEROPENEM 100 MILLIGRAM(S): 1 INJECTION INTRAVENOUS at 21:56

## 2019-12-09 RX ADMIN — Medication 650 MILLIGRAM(S): at 20:02

## 2019-12-09 RX ADMIN — Medication 650 MILLIGRAM(S): at 13:00

## 2019-12-09 RX ADMIN — Medication 250 MILLIGRAM(S): at 17:59

## 2019-12-09 RX ADMIN — Medication 3 MILLILITER(S): at 04:50

## 2019-12-09 NOTE — CONSULT NOTE ADULT - SUBJECTIVE AND OBJECTIVE BOX
JENNY, LIZ  39y, Male  Allergy: No Known Allergies      CHIEF COMPLAINT: fever with productive cough (08 Dec 2019 05:50)      HPI:  40yo male with a past medical history of DMI on insulin and HLD presents to ED with a chief complaint of intermittent fever and productive cough (clear/yellow phlegm) for the past 3 weeks. Pt reports Tmax of 103 with generalized body aches, fever, chills, nausea and vomiting. Pt was seen at urgent care on  and was prescribed 1 week of Levaquin and 5 days of steroids. Pt reports temporary improvement in fever but worsening of cough. Pt was at urgent care again on  and was told to get further workup at ED after being diagnosed with pneumonia.  Pt reports his daughters are currently sick, coughing but not in any severe condition. Denies SOB, CP or abdominal pain. Currently feels better, continues to have productive cough but denies any chills or fever at this time.     Sepsis initiated in ED    Infectious Diseases History:  Old Micro:    FAMILY HISTORY:    PAST MEDICAL & SURGICAL HISTORY:  Diabetes, type I  History of trigger finger      SOCIAL HISTORY    Substance Use (  ) never used  (  ) IVDU (  ) Other:  Tobacco Usage:  (   ) never smoked   ( - ) former smoker   (   ) current smoker   Alcohol Usage: (   ) social  (   ) daily use (   ) denies  Sexual History:       ROS  General: Denies rigors, admits to fever, chills  HEENT: Denies headache, rhinorrhea, sore throat, eye pain  CV: Denies CP,  PULM: Denies wheezing, hemoptysis  GI: Denies hematemesis, hematochezia, melena  : Denies discharge, hematuria  MSK: Denies arthralgias, myalgias  SKIN: Denies rash, lesions  NEURO: Denies paresthesias, weakness  PSYCH: Denies depression, anxiety    VITALS:  T(F): 99.9, Max: 102.1 (19 @ 10:45)  HR: 101  BP: 130/76  RR: 20Vital Signs Last 24 Hrs  T(C): 37.7 (09 Dec 2019 04:20), Max: 38.9 (08 Dec 2019 10:45)  T(F): 99.9 (09 Dec 2019 04:20), Max: 102.1 (08 Dec 2019 10:45)  HR: 101 (09 Dec 2019 04:20) (101 - 110)  BP: 130/76 (09 Dec 2019 04:20) (123/77 - 130/76)  BP(mean): --  RR: 20 (09 Dec 2019 04:20) (16 - 20)  SpO2: 96% (09 Dec 2019 04:20) (92% - 96%)    PHYSICAL EXAM:  Gen: NAD, resting in bed  HEENT: Normocephalic, atraumatic  Neck: supple, no lymphadenopathy  CV: Regular rate & regular rhythm  Lungs: No wheezing, no rales, Congested, decreased breath sounds b/l of lower lung bases  Abdomen: Soft, BS present  Ext: Warm, well perfused  Neuro: non focal, awake  Skin: no rash, no lesions    TESTS & MEASUREMENTS:                        12.8   10.85 )-----------( 245      ( 09 Dec 2019 06:40 )             37.4         141  |  100  |  13  ----------------------------<  171<H>  4.2   |  24  |  0.9    Ca    8.5      09 Dec 2019 06:40  Mg     1.8         TPro  5.7<L>  /  Alb  3.3<L>  /  TBili  0.8  /  DBili  x   /  AST  15  /  ALT  19  /  AlkPhos  59      eGFR if Non African American: 108 mL/min/1.73M2 (19 @ 06:40)  eGFR if African American: 125 mL/min/1.73M2 (19 @ 06:40)    LIVER FUNCTIONS - ( 08 Dec 2019 07:17 )  Alb: 3.3 g/dL / Pro: 5.7 g/dL / ALK PHOS: 59 U/L / ALT: 19 U/L / AST: 15 U/L / GGT: x           Urinalysis Basic - ( 07 Dec 2019 16:40 )    Color: Yellow / Appearance: Clear / S.043 / pH: x  Gluc: x / Ketone: Large  / Bili: Negative / Urobili: <2 mg/dL   Blood: x / Protein: 30 mg/dL / Nitrite: Negative   Leuk Esterase: Negative / RBC: 1 /HPF / WBC 2 /HPF   Sq Epi: x / Non Sq Epi: 1 /HPF / Bacteria: Negative        Culture - Urine (collected 19 @ 16:40)  Source: .Urine Clean Catch (Midstream)  Final Report (19 @ 07:22):    No growth    Culture - Blood (collected 19 @ 16:21)  Source: .Blood None  Preliminary Report (19 @ 01:02):    No growth to date.    Culture - Blood (collected 19 @ 16:21)  Source: .Blood None  Preliminary Report (19 @ 01:02):    No growth to date.    Culture - Sputum (collected 19 @ 16:15)  Source: .Sputum Sputum  Gram Stain (19 @ 06:49):    Norovirus GI/GII polymorphonuclear leukocytes per low power field    ***Add Quantity Code*** Squamous epithelial cells per low power field    Moderate Gram positive cocci in pairs seen per oil power field    Moderate Gram Negative Rods seen per oil powerfield  Preliminary Report (19 @ 18:49):    Normal Respiratory Smitha present    Culture - Blood (collected 19 @ 10:52)  Source: .Blood Blood-Peripheral  Preliminary Report (19 @ 19:01):    No growth to date.    Culture - Blood (collected 19 @ 10:52)  Source: .Blood Blood-Peripheral  Preliminary Report (19 @ 19:01):    No growth to date.        Lactate, Blood: 1.3 mmol/L (19 @ 14:00)  Blood Gas Venous - Lactate: 1.8 mmoL/L (19 @ 11:16)  Lactate, Blood: 1.8 mmol/L (19 @ 10:52)      INFECTIOUS DISEASES TESTING  Legionella Antigen, Urine: Negative (19 @ 16:40)  HIV-1/2 Combo Result: Nonreact (19 @ 16:21)      RADIOLOGY & ADDITIONAL TESTS:  I have personally reviewed the last Chest xray    CXR  Xray Chest 2 Views PA/Lat:   EXAM:  XR CHEST PA LAT 2V          PROCEDURE DATE:  2019      INTERPRETATION:  Clinical History / Reason for exam: Fever    Comparison : Chest radiograph 2010.    Technique/Positioning: Frontal and lateral views.    Impression:    Support devices: None.    Cardiac/mediastinum/hilum: Unremarkable.    Lung parenchyma/Pleura: Retrocardiac linear opacity, likely atelectasis.   Right lung is unremarkable. No evidence of pneumothorax.    Skeleton/soft tissues: Stable            VIJAY COVARRUBIAS M.D., ATTENDING RADIOLOGIST  This document has been electronically signed. Dec  7 2019  1:22PM     (19 @ 11:34)      CT      CARDIOLOGY TESTING  12 Lead ECG:   Ventricular Rate 126 BPM    Atrial Rate 126 BPM    P-R Interval 150 ms    QRS Duration 88 ms    Q-T Interval 292 ms    QTC Calculation(Bezet) 422 ms    P Axis 48 degrees    R Axis 43 degrees    T Axis 45 degrees    Diagnosis Line Sinus tachycardia  Otherwise normal ECG    Confirmed by ESTELLA HERNANDEZ MD ([id number]) on 2019 1:52:13 PM (19 @ 11:23)      All available historical records have been reviewed    MEDICATIONS  ALBUTerol    90 MICROgram(s) HFA Inhaler 1  albuterol/ipratropium for Nebulization 3  atorvastatin 10  azithromycin  IVPB 500  benzocaine 15 mG/menthol 3.6 mG (Sugar-Free) Lozenge 1  cefTRIAXone   IVPB 1000  enoxaparin Injectable 40  tiotropium 18 MICROgram(s) Capsule 1      ANTIBIOTICS:  azithromycin  IVPB 500 milliGRAM(s) IV Intermittent every 24 hours  cefTRIAXone   IVPB 1000 milliGRAM(s) IV Intermittent every 24 hours      All available historical data has been reviewed JENNY LIZ  39y, Male  Allergy: No Known Allergies      CHIEF COMPLAINT: fever with productive cough (08 Dec 2019 05:50)      HPI:  40yo male with a past medical history of DMI on insulin and HLD presents to ED with a chief complaint of intermittent fever and productive cough (clear/yellow phlegm) for the past 3 weeks. Pt reports Tmax of 103 with generalized body aches, fever, chills, nausea and vomiting. Pt was seen at urgent care on  and was prescribed 1 week of Levaquin and 5 days of steroids. Pt reports temporary improvement in fever but worsening of cough. Pt was at urgent care again on  and was told to get further workup at ED after being diagnosed with pneumonia.  Pt reports his daughters are currently sick, coughing but not in any severe condition. Denies any recent travel. Denies SOB, CP or abdominal pain. Currently feels better, continues to have productive cough but denies any chills or nausea at this time.    Sepsis initiated in ED    Infectious Diseases History:  Old Micro:    FAMILY HISTORY:    PAST MEDICAL & SURGICAL HISTORY:  Diabetes, type I  History of trigger finger      SOCIAL HISTORY    Substance Use (  ) never used  (  ) IVDU (  ) Other:  Tobacco Usage:  (   ) never smoked   ( - ) former smoker   (   ) current smoker   Alcohol Usage: (   ) social  (   ) daily use (   ) denies  Sexual History:       ROS  General: Denies rigors, admits to fever, chills  HEENT: Denies headache, rhinorrhea, sore throat, eye pain  CV: Denies CP,  PULM: Denies wheezing, hemoptysis  GI: Denies hematemesis, hematochezia, melena  : Denies discharge, hematuria  MSK: Denies arthralgias, myalgias  SKIN: Denies rash, lesions  NEURO: Denies paresthesias, weakness  PSYCH: Denies depression, anxiety    VITALS:  T(F): 99.9, Max: 102.1 (19 @ 10:45)  HR: 101  BP: 130/76  RR: 20Vital Signs Last 24 Hrs  T(C): 37.7 (09 Dec 2019 04:20), Max: 38.9 (08 Dec 2019 10:45)  T(F): 99.9 (09 Dec 2019 04:20), Max: 102.1 (08 Dec 2019 10:45)  HR: 101 (09 Dec 2019 04:20) (101 - 110)  BP: 130/76 (09 Dec 2019 04:20) (123/77 - 130/76)  BP(mean): --  RR: 20 (09 Dec 2019 04:20) (16 - 20)  SpO2: 96% (09 Dec 2019 04:20) (92% - 96%)    PHYSICAL EXAM:  Gen: NAD, resting in bed  HEENT: Normocephalic, atraumatic  Neck: supple, no lymphadenopathy  CV: Regular rate & regular rhythm  Lungs: No wheezing, no rales, Congested, decreased breath sounds b/l of lower lung bases  Abdomen: Soft, BS present  Ext: Warm, well perfused  Neuro: non focal, awake  Skin: no rash, no lesions    TESTS & MEASUREMENTS:                        12.8   10.85 )-----------( 245      ( 09 Dec 2019 06:40 )             37.4         141  |  100  |  13  ----------------------------<  171<H>  4.2   |  24  |  0.9    Ca    8.5      09 Dec 2019 06:40  Mg     1.8         TPro  5.7<L>  /  Alb  3.3<L>  /  TBili  0.8  /  DBili  x   /  AST  15  /  ALT  19  /  AlkPhos  59  12    eGFR if Non African American: 108 mL/min/1.73M2 (19 @ 06:40)  eGFR if African American: 125 mL/min/1.73M2 (19 @ 06:40)    LIVER FUNCTIONS - ( 08 Dec 2019 07:17 )  Alb: 3.3 g/dL / Pro: 5.7 g/dL / ALK PHOS: 59 U/L / ALT: 19 U/L / AST: 15 U/L / GGT: x           Urinalysis Basic - ( 07 Dec 2019 16:40 )    Color: Yellow / Appearance: Clear / S.043 / pH: x  Gluc: x / Ketone: Large  / Bili: Negative / Urobili: <2 mg/dL   Blood: x / Protein: 30 mg/dL / Nitrite: Negative   Leuk Esterase: Negative / RBC: 1 /HPF / WBC 2 /HPF   Sq Epi: x / Non Sq Epi: 1 /HPF / Bacteria: Negative        Culture - Urine (collected 12-07-19 @ 16:40)  Source: .Urine Clean Catch (Midstream)  Final Report (19 @ 07:22):    No growth    Culture - Blood (collected 19 @ 16:21)  Source: .Blood None  Preliminary Report (19 @ 01:02):    No growth to date.    Culture - Blood (collected 19 @ 16:21)  Source: .Blood None  Preliminary Report (19 @ 01:02):    No growth to date.    Culture - Sputum (collected 19 @ 16:15)  Source: .Sputum Sputum  Gram Stain (19 @ 06:49):    Norovirus GI/GII polymorphonuclear leukocytes per low power field    ***Add Quantity Code*** Squamous epithelial cells per low power field    Moderate Gram positive cocci in pairs seen per oil power field    Moderate Gram Negative Rods seen per oil powerfield  Preliminary Report (19 @ 18:49):    Normal Respiratory Smitha present    Culture - Blood (collected 19 @ 10:52)  Source: .Blood Blood-Peripheral  Preliminary Report (19 @ 19:01):    No growth to date.    Culture - Blood (collected 19 @ 10:52)  Source: .Blood Blood-Peripheral  Preliminary Report (19 @ 19:01):    No growth to date.        Lactate, Blood: 1.3 mmol/L (19 @ 14:00)  Blood Gas Venous - Lactate: 1.8 mmoL/L (19 @ 11:16)  Lactate, Blood: 1.8 mmol/L (19 @ 10:52)      INFECTIOUS DISEASES TESTING  Legionella Antigen, Urine: Negative (19 @ 16:40)  HIV-1/2 Combo Result: Nonreact (19 @ 16:21)      RADIOLOGY & ADDITIONAL TESTS:  I have personally reviewed the last Chest xray    CXR  Xray Chest 2 Views PA/Lat:   EXAM:  XR CHEST PA LAT 2V          PROCEDURE DATE:  2019      INTERPRETATION:  Clinical History / Reason for exam: Fever    Comparison : Chest radiograph 2010.    Technique/Positioning: Frontal and lateral views.    Impression:    Support devices: None.    Cardiac/mediastinum/hilum: Unremarkable.    Lung parenchyma/Pleura: Retrocardiac linear opacity, likely atelectasis.   Right lung is unremarkable. No evidence of pneumothorax.    Skeleton/soft tissues: Stable            VIJAY COVARRUBIAS M.D., ATTENDING RADIOLOGIST  This document has been electronically signed. Dec  7 2019  1:22PM     (19 @ 11:34)      CT      CARDIOLOGY TESTING  12 Lead ECG:   Ventricular Rate 126 BPM    Atrial Rate 126 BPM    P-R Interval 150 ms    QRS Duration 88 ms    Q-T Interval 292 ms    QTC Calculation(Bezet) 422 ms    P Axis 48 degrees    R Axis 43 degrees    T Axis 45 degrees    Diagnosis Line Sinus tachycardia  Otherwise normal ECG    Confirmed by ESTELLA HERNANDEZ MD ([id number]) on 2019 1:52:13 PM (19 @ 11:23)      All available historical records have been reviewed    MEDICATIONS  ALBUTerol    90 MICROgram(s) HFA Inhaler 1  albuterol/ipratropium for Nebulization 3  atorvastatin 10  azithromycin  IVPB 500  benzocaine 15 mG/menthol 3.6 mG (Sugar-Free) Lozenge 1  cefTRIAXone   IVPB 1000  enoxaparin Injectable 40  tiotropium 18 MICROgram(s) Capsule 1      ANTIBIOTICS:  azithromycin  IVPB 500 milliGRAM(s) IV Intermittent every 24 hours  cefTRIAXone   IVPB 1000 milliGRAM(s) IV Intermittent every 24 hours      All available historical data has been reviewed JENNY LIZ  39y, Male  Allergy: No Known Allergies      CHIEF COMPLAINT: fever with productive cough (08 Dec 2019 05:50)      HPI:  40yo male with a past medical history of DMI on insulin and HLD presents to ED with a chief complaint of intermittent fever and productive cough (clear/yellow phlegm) for the past 3 weeks. Pt reports Tmax of 103 with generalized body aches, fever, chills, nausea and vomiting. Pt was seen at urgent care on  and was prescribed 1 week of Levaquin and 5 days of steroids. Pt reports temporary improvement in fever but worsening of cough. Pt was at urgent care again on  and was told to get further workup at ED after being diagnosed with pneumonia.  Pt reports his daughters are currently sick, coughing but not in any severe condition. Denies any recent travel. Denies SOB, CP or abdominal pain. Currently feels better, continues to have productive cough but denies any chills or nausea at this time.    Sepsis initiated in ED    Infectious Diseases History:  Old Micro:    FAMILY HISTORY:    PAST MEDICAL & SURGICAL HISTORY:  Diabetes, type I  History of trigger finger      SOCIAL HISTORY    Substance Use (  ) never used  (  ) IVDU (  ) Other:  Tobacco Usage:  (   ) never smoked   ( - ) former smoker   (   ) current smoker   Alcohol Usage: (   ) social  (   ) daily use (  x ) denies  Sexual History: na      ROS  General: Denies rigors, admits to fever, chills  HEENT: Denies headache, rhinorrhea, sore throat, eye pain  CV: Denies CP,  PULM: Denies wheezing, hemoptysis  GI: Denies hematemesis, hematochezia, melena  : Denies discharge, hematuria  MSK: Denies arthralgias, myalgias  SKIN: Denies rash, lesions  NEURO: Denies paresthesias, weakness  PSYCH: Denies depression, anxiety    VITALS:  T(F): 99.9, Max: 102.1 (19 @ 10:45)  HR: 101  BP: 130/76  RR: 20Vital Signs Last 24 Hrs  T(C): 37.7 (09 Dec 2019 04:20), Max: 38.9 (08 Dec 2019 10:45)  T(F): 99.9 (09 Dec 2019 04:20), Max: 102.1 (08 Dec 2019 10:45)  HR: 101 (09 Dec 2019 04:20) (101 - 110)  BP: 130/76 (09 Dec 2019 04:20) (123/77 - 130/76)  BP(mean): --  RR: 20 (09 Dec 2019 04:20) (16 - 20)  SpO2: 96% (09 Dec 2019 04:20) (92% - 96%)    PHYSICAL EXAM:  Gen: NAD, resting in bed  HEENT: Normocephalic, atraumatic  Neck: supple, no lymphadenopathy  CV: Regular rate & regular rhythm  Lungs: No wheezing, no rales, Congested, decreased breath sounds b/l of lower lung bases  Abdomen: Soft, BS present  Ext: Warm, well perfused  Neuro: non focal, awake  Skin: no rash, no lesions    TESTS & MEASUREMENTS:                        12.8   10.85 )-----------( 245      ( 09 Dec 2019 06:40 )             37.4         141  |  100  |  13  ----------------------------<  171<H>  4.2   |  24  |  0.9    Ca    8.5      09 Dec 2019 06:40  Mg     1.8     12    TPro  5.7<L>  /  Alb  3.3<L>  /  TBili  0.8  /  DBili  x   /  AST  15  /  ALT  19  /  AlkPhos  59  12    eGFR if Non African American: 108 mL/min/1.73M2 (19 @ 06:40)  eGFR if African American: 125 mL/min/1.73M2 (19 @ 06:40)    LIVER FUNCTIONS - ( 08 Dec 2019 07:17 )  Alb: 3.3 g/dL / Pro: 5.7 g/dL / ALK PHOS: 59 U/L / ALT: 19 U/L / AST: 15 U/L / GGT: x           Urinalysis Basic - ( 07 Dec 2019 16:40 )    Color: Yellow / Appearance: Clear / S.043 / pH: x  Gluc: x / Ketone: Large  / Bili: Negative / Urobili: <2 mg/dL   Blood: x / Protein: 30 mg/dL / Nitrite: Negative   Leuk Esterase: Negative / RBC: 1 /HPF / WBC 2 /HPF   Sq Epi: x / Non Sq Epi: 1 /HPF / Bacteria: Negative        Culture - Urine (collected 19 @ 16:40)  Source: .Urine Clean Catch (Midstream)  Final Report (19 @ 07:22):    No growth    Culture - Blood (collected 19 @ 16:21)  Source: .Blood None  Preliminary Report (19 @ 01:02):    No growth to date.    Culture - Blood (collected 19 @ 16:21)  Source: .Blood None  Preliminary Report (19 @ 01:02):    No growth to date.    Culture - Sputum (collected 19 @ 16:15)  Source: .Sputum Sputum  Gram Stain (19 @ 06:49):    Norovirus GI/GII polymorphonuclear leukocytes per low power field    ***Add Quantity Code*** Squamous epithelial cells per low power field    Moderate Gram positive cocci in pairs seen per oil power field    Moderate Gram Negative Rods seen per oil powerfield  Preliminary Report (19 @ 18:49):    Normal Respiratory Smitha present    Culture - Blood (collected 19 @ 10:52)  Source: .Blood Blood-Peripheral  Preliminary Report (19 @ 19:01):    No growth to date.    Culture - Blood (collected 19 @ 10:52)  Source: .Blood Blood-Peripheral  Preliminary Report (19 @ 19:01):    No growth to date.        Lactate, Blood: 1.3 mmol/L (19 @ 14:00)  Blood Gas Venous - Lactate: 1.8 mmoL/L (19 @ 11:16)  Lactate, Blood: 1.8 mmol/L (19 @ 10:52)      INFECTIOUS DISEASES TESTING  Legionella Antigen, Urine: Negative (19 @ 16:40)  HIV-1/2 Combo Result: Nonreact (19 @ 16:21)      RADIOLOGY & ADDITIONAL TESTS:  I have personally reviewed the last Chest xray    CXR  Xray Chest 2 Views PA/Lat:   EXAM:  XR CHEST PA LAT 2V          PROCEDURE DATE:  2019      INTERPRETATION:  Clinical History / Reason for exam: Fever    Comparison : Chest radiograph 2010.    Technique/Positioning: Frontal and lateral views.    Impression:    Support devices: None.    Cardiac/mediastinum/hilum: Unremarkable.    Lung parenchyma/Pleura: Retrocardiac linear opacity, likely atelectasis.   Right lung is unremarkable. No evidence of pneumothorax.    Skeleton/soft tissues: Stable            VIJAY COVARRUBIAS M.D., ATTENDING RADIOLOGIST  This document has been electronically signed. Dec  7 2019  1:22PM     (19 @ 11:34)      CT      CARDIOLOGY TESTING  12 Lead ECG:   Ventricular Rate 126 BPM    Atrial Rate 126 BPM    P-R Interval 150 ms    QRS Duration 88 ms    Q-T Interval 292 ms    QTC Calculation(Bezet) 422 ms    P Axis 48 degrees    R Axis 43 degrees    T Axis 45 degrees    Diagnosis Line Sinus tachycardia  Otherwise normal ECG    Confirmed by ESTELLA HERNANDEZ MD ([id number]) on 2019 1:52:13 PM (19 @ 11:23)      All available historical records have been reviewed    MEDICATIONS  ALBUTerol    90 MICROgram(s) HFA Inhaler 1  albuterol/ipratropium for Nebulization 3  atorvastatin 10  azithromycin  IVPB 500  benzocaine 15 mG/menthol 3.6 mG (Sugar-Free) Lozenge 1  cefTRIAXone   IVPB 1000  enoxaparin Injectable 40  tiotropium 18 MICROgram(s) Capsule 1      ANTIBIOTICS:  azithromycin  IVPB 500 milliGRAM(s) IV Intermittent every 24 hours  cefTRIAXone   IVPB 1000 milliGRAM(s) IV Intermittent every 24 hours      All available historical data has been reviewed

## 2019-12-09 NOTE — PROGRESS NOTE ADULT - ASSESSMENT
Sepsis ( with WBC of 19, temp 102.1, tachy 137/min,) probable PNA  Hx of Type I DM   Hx of DLD  Hx of trigger finger    CXR reviewed + retrocardiac linear opacity  CT of the chest noncontrast P  cont IV ABx  blood C&S neg x 2  Legionella u Ag neg  strep pneumoniae Ag neg  Resp Viral Panel  Pulmonary consult  ID consult  WBC trending down, Tmax today 100  encourage oral fluids

## 2019-12-09 NOTE — CONSULT NOTE ADULT - ASSESSMENT
39y Male with a PMH of DMI and HLD presents with a chief complaint of intermittent fever and productive cough (clear/yellow phlegm) for the past 3 weeks. Pt reports Tmax of 103 with generalized body aches, fever, chills, nausea and vomiting. Pt seen by urgent care on 11/23 and was prescribed Levaquin and steroids with only temporary improvement in fever and worsening of cough. Pt was at urgent care again on 12/7 and was told to get further workup at ED after being diagnosed with pneumonia.      IMPRESSION  #Sepsis likely secondary to PNA  - Sepsis on admission (T 101, , WBC 19.22) Tmax 102.1  - Hemodynamically stable, leukocytosis trending down WBC 10.85, T 99.9, lactate 1.3  - Flu A/B, RSV and sputum culture neg  - BCx neg 2x, UCx neg  - HIV1/2, Legionella neg  - CXR showing retrocardiac linear opacity likely atelectasis. Right lung unremarkable. No evidence of pneumothorax    RECOMMENDATIONS  - F/U CT     This is a pended note. All final recommendations to follow pending discussion with ID Attending 39y Male with a PMH of DMI and HLD presents with a chief complaint of intermittent fever and productive cough (clear/yellow phlegm) for the past 3 weeks. Pt reports Tmax of 103 with generalized body aches, fever, chills, nausea and vomiting. Pt seen by urgent care on 11/23 and was prescribed Levaquin and steroids with only temporary improvement in fever and worsening of cough. Pt was at urgent care again on 12/7 and was told to get further workup at ED after being diagnosed with pneumonia.      IMPRESSION  #Sepsis likely secondary to PNA  - Sepsis on admission (T 102, , WBC 19.22) Tmax 102.1  - Hemodynamically stable, leukocytosis trending down WBC 10.85, T 99.9, lactate 1.3  - Flu A/B, RSV and sputum culture neg  - BCx neg 2x, UCx neg  - HIV1/2, Legionella neg  - CXR showing retrocardiac linear opacity likely atelectasis. Right lung unremarkable. No evidence of pneumothorax    RECOMMENDATIONS  - F/U CT     This is a pended note. All final recommendations to follow pending discussion with ID Attending 39y Male with a PMH of DMI and HLD presents with a chief complaint of intermittent fever (Tmax 103) and productive cough (clear/yellow phlegm) for the past 3 weeks. Pt reports generalized body aches, fever, chills, nausea and vomiting. Initially seen by urgent care on 11/23 and was on Levaquin and steroids with only temporary improvement in fever and worsening of cough. Pt diagnosed with pneumonia at urgent care on 12/7 and now presents to ED for further work-up    IMPRESSION  # Sepsis secondary to PNA, clinical picture and imaging more likely viral in nature  - Sepsis on admission (T 102, , WBC 19.22) Tmax 102.1  - Hemodynamically stable, leukocytosis trending down WBC 10.85, T 99.9, lactate 1.3  - Sputum culture neg  - BC, UC neg  - Flu A/B, RSV, HIV1/2, Legionella neg  - CXR showing retrocardiac linear opacity likely atelectasis. Right lung unremarkable. No evidence of pneumothorax    RECOMMENDATIONS  - Will continue Ceftriaxone and Azithromycin for now  - F/U pulm and CT chest  - Repeat sputum culture    This is a pended note. All final recommendations to follow pending discussion with ID Attending 39y Male with a PMH of DMI and HLD presents with a chief complaint of intermittent fever (Tmax 103) and productive cough (clear/yellow phlegm) for the past 3 weeks. Initially seen by urgent care on 11/23 and was on Levaquin and steroids with only temporary improvement in fever and worsening of cough. Pt diagnosed with pneumonia at urgent care on 12/7 and now presents to ED for further work-up    IMPRESSION  # Sepsis secondary to PNA, clinical picture and imaging more likely viral in nature  - Sepsis on admission (T 102, , WBC 19.22) Tmax 102.1  - Hemodynamically stable, leukocytosis trending down WBC 10.85, T 99.9, lactate 1.3  - Sputum culture neg  - BC, UC neg  - Flu A/B, RSV, HIV1/2, Legionella neg  - CXR showing retrocardiac linear opacity likely atelectasis. Right lung unremarkable. No evidence of pneumothorax    RECOMMENDATIONS  - Will continue Ceftriaxone and Azithromycin for now  - F/U pulm and CT chest  - Repeat sputum culture    This is a pended note. All final recommendations to follow pending discussion with ID Attending 39y Male with a PMH of DMI and HLD presents with a chief complaint of intermittent fever (Tmax 103) and productive cough (clear/yellow phlegm) for the past 3 weeks. Initially seen by urgent care on 11/23 and was on Levaquin and steroids with only temporary improvement in fever and worsening of cough. Pt diagnosed with pneumonia at urgent care on 12/7 and now presents to ED for further work-up    IMPRESSION  # Sepsis secondary to suspected atypical PNA, r/o inflammatory causes as CXR not very remarkable, clinical picture and imaging more likely viral in nature. No consolidation on CXR  - Sepsis on admission (T 102, , WBC 19.22) Tmax 102.1  - Hemodynamically stable, leukocytosis trending down WBC 10.85, T 99.9, lactate 1.3  - Sputum culture neg  - BC, UC neg  - Flu A/B, RSV, HIV1/2, Legionella neg  - CXR showing retrocardiac linear opacity likely atelectasis. Right lung unremarkable. No evidence of pneumothorax  - No real exposures, children at home sick, no vaping    RECOMMENDATIONS  - Check procalcitonin, ESR, CRP, ANCA  - Will continue Ceftriaxone and Azithromycin for now  - F/U pulm and CT chest wo  - Repeat sputum culture  - mycoplasma IgM

## 2019-12-09 NOTE — PROGRESS NOTE ADULT - SUBJECTIVE AND OBJECTIVE BOX
SUBJECTIVE:    Patient is a 39y old Male who presents with a chief complaint of fever with productive cough (08 Dec 2019 05:50)    Overnight Events: Patient is still febrile, having still productive cough and shortness of breath and pleuritic chest pain.  He is on NC, saturating well.  Other VS are stable    PAST MEDICAL & SURGICAL HISTORY  Diabetes, type I  History of trigger finger    SOCIAL HISTORY:  Negative for smoking/alcohol/drug use.     ALLERGIES:  No Known Allergies    MEDICATIONS:  STANDING MEDICATIONS  ALBUTerol    90 MICROgram(s) HFA Inhaler 1 Puff(s) Inhalation every 4 hours  albuterol/ipratropium for Nebulization 3 milliLiter(s) Nebulizer every 6 hours  atorvastatin 10 milliGRAM(s) Oral at bedtime  azithromycin  IVPB 500 milliGRAM(s) IV Intermittent every 24 hours  benzocaine 15 mG/menthol 3.6 mG (Sugar-Free) Lozenge 1 Lozenge Oral two times a day  cefTRIAXone   IVPB 1000 milliGRAM(s) IV Intermittent every 24 hours  enoxaparin Injectable 40 milliGRAM(s) SubCutaneous daily  tiotropium 18 MICROgram(s) Capsule 1 Capsule(s) Inhalation daily    PRN MEDICATIONS  acetaminophen   Tablet .. 650 milliGRAM(s) Oral every 6 hours PRN  guaiFENesin   Syrup  (Sugar-Free) 100 milliGRAM(s) Oral every 6 hours PRN    VITALS:   T(F): 99.9, Max: 102.1 (- @ 10:45)  HR: 101 (101 - 110)  BP: 130/76 (123/77 - 130/76)  RR: 20 (16 - 20)  SpO2: 96% (92% - 96%)    LABS:                        12.8   10.85 )-----------( 245      ( 09 Dec 2019 06:40 )             37.4         141  |  100  |  13  ----------------------------<  171<H>  4.2   |  24  |  0.9    Ca    8.5      09 Dec 2019 06:40  Mg     1.8         TPro  5.7<L>  /  Alb  3.3<L>  /  TBili  0.8  /  DBili  x   /  AST  15  /  ALT  19  /  AlkPhos  59      PT/INR - ( 07 Dec 2019 10:52 )   PT: 15.90 sec;   INR: 1.39 ratio         PTT - ( 07 Dec 2019 10:52 )  PTT:31.3 sec  Urinalysis Basic - ( 07 Dec 2019 16:40 )    Color: Yellow / Appearance: Clear / S.043 / pH: x  Gluc: x / Ketone: Large  / Bili: Negative / Urobili: <2 mg/dL   Blood: x / Protein: 30 mg/dL / Nitrite: Negative   Leuk Esterase: Negative / RBC: 1 /HPF / WBC 2 /HPF   Sq Epi: x / Non Sq Epi: 1 /HPF / Bacteria: Negative            Culture - Urine (collected 07 Dec 2019 16:40)  Source: .Urine Clean Catch (Midstream)  Final Report (09 Dec 2019 07:22):    No growth    Culture - Blood (collected 07 Dec 2019 16:21)  Source: .Blood None  Preliminary Report (09 Dec 2019 01:02):    No growth to date.    Culture - Blood (collected 07 Dec 2019 16:21)  Source: .Blood None  Preliminary Report (09 Dec 2019 01:02):    No growth to date.    Culture - Sputum (collected 07 Dec 2019 16:15)  Source: .Sputum Sputum  Gram Stain (08 Dec 2019 06:49):    Norovirus GI/GII polymorphonuclear leukocytes per low power field    ***Add Quantity Code*** Squamous epithelial cells per low power field    Moderate Gram positive cocci in pairs seen per oil power field    Moderate Gram Negative Rods seen per oil powerfield  Preliminary Report (08 Dec 2019 18:49):    Normal Respiratory Smitha present    Culture - Blood (collected 07 Dec 2019 10:52)  Source: .Blood Blood-Peripheral  Preliminary Report (08 Dec 2019 19:01):    No growth to date.    Culture - Blood (collected 07 Dec 2019 10:52)  Source: .Blood Blood-Peripheral  Preliminary Report (08 Dec 2019 19:01):    No growth to date.                  IMAGING/EKG:  < from: Xray Chest 2 Views PA/Lat (19 @ 11:34) >    Lung parenchyma/Pleura: Retrocardiac linear opacity, likely atelectasis.   Right lung is unremarkable. No evidence of pneumothorax.    < end of copied text >    PHYSICAL EXAM:  GEN: NAD, comfortable  LUNGS: CTAB, no w/r/r  HEART: RRR, s1 and s2 appreciated, no m/r/g  ABD: soft, NT/ND, +BS  EXT: no edema, PP b/l  NEURO: AAOX3

## 2019-12-09 NOTE — PROGRESS NOTE ADULT - ASSESSMENT
40 y/o m w/ pmhx of type 1 dm, hld presents with ~ 3 weeks of productive cough and fever persistent despite levaquin and steroid course.    # Bronchitis vs PNA  - Sepsis on admission  - Still spiking fevers, leukocytosis improving  - CXR did not show clear opacity  - started on azithromycin and ceftriaxone  - c/s pulm and ID  - Blood Cx and urine cx negative  - Sputum Cx showed normal respiratory gareth  - urine legionella, HIV, Flu A/B RSV negative    # Sinus tachycardia likely from fever  - check TSH as pt has weight loss.     # DM Type 1  - Pt has insulin pump and wants to continue using it.  - monitor finger stick AC+ HS.    # DLD  - c/w statins    DVT PPx:  Lovenox 40 mg s/c  GI PPX:  not indicated  Diet: DASH/Carb consistent  Activity: Increase as tolerated  Dispo: from home, no needs  Code Status: full code

## 2019-12-09 NOTE — CONSULT NOTE ADULT - ASSESSMENT
Impression  >CAP - ? superimposed bacterial infection    Recommendations    Keep sats > 92%   Duonebs prn   Ceftriaxone, Doxy  Repeat sputum culture  Send RVP   DVT px     Thank you for the consult. Impression    - Highly suspect bacterial pneumonia ( started with viral symptoms had swab neg in urgent care ( risk factors DM/ recent abx usage), [facility] WBC on admission with L shift    Recommendations    Keep sats > 92%   Duonebs prn  change ceftriaxone to cefepime  procalcitonin  Chest CT NC  Repeat sputum culture  Send RVP   DVT px   BS control

## 2019-12-09 NOTE — PROGRESS NOTE ADULT - SUBJECTIVE AND OBJECTIVE BOX
LIZ ALVARADO 40yo  Male came to the ER for c/o cont cough with fever of 102.1, productive cough and leukocytosis of 19.  The pt has Type I DM, and has had cough and fever vahe dn off x 3 weeks.  The pt was seen in urgent care and was given Levaquin.  The Sx initially improved but then resumed and pt had 2nd visit to the urgent care and was told to go to the ER for IV Abx and the working dx of PNA.  In the ER the pt had temp of 102.1, WBC of 19 and HR of 137, thus, Sepsis on admission. The pt was cultured and placed on IV Ceftriaxone and azithromycin. The initial CXR showed retrocardiac linear opacity.   The pt is being evaluated by ID and Pul.  The PMHx includes Type I DM, + insulin pump, trigger finger.    INTERVAL HPI/OVERNIGHT EVENTS:    MEDICATIONS  (STANDING):  ALBUTerol    90 MICROgram(s) HFA Inhaler 1 Puff(s) Inhalation every 4 hours  albuterol/ipratropium for Nebulization 3 milliLiter(s) Nebulizer every 6 hours  atorvastatin 10 milliGRAM(s) Oral at bedtime  benzocaine 15 mG/menthol 3.6 mG (Sugar-Free) Lozenge 1 Lozenge Oral two times a day  chlorhexidine 4% Liquid 1 Application(s) Topical <User Schedule>  enoxaparin Injectable 40 milliGRAM(s) SubCutaneous daily  meropenem  IVPB 1000 milliGRAM(s) IV Intermittent every 8 hours  tiotropium 18 MICROgram(s) Capsule 1 Capsule(s) Inhalation daily  vancomycin  IVPB        MEDICATIONS  (PRN):  acetaminophen   Tablet .. 650 milliGRAM(s) Oral every 6 hours PRN Temp greater or equal to 38C (100.4F), Mild Pain (1 - 3)  guaiFENesin   Syrup  (Sugar-Free) 100 milliGRAM(s) Oral every 6 hours PRN Cough      Allergies    No Known Allergies    Vital Signs Last 24 Hrs  T(C): 37.8 (09 Dec 2019 21:26), Max: 38.6 (09 Dec 2019 12:27)  T(F): 100.1 (09 Dec 2019 21:26), Max: 101.4 (09 Dec 2019 12:27)  HR: 102 (09 Dec 2019 21:26) (101 - 111)  BP: 118/55 (09 Dec 2019 21:26) (118/55 - 130/76)  BP(mean): --  RR: 20 (09 Dec 2019 21:26) (20 - 20)  SpO2: 96% (09 Dec 2019 04:20) (92% - 96%)    PHYSICAL EXAM:      Constitutional:  pt is alert and oriented x 3, uncomfortable but NAD    Eyes:  normal    ENMT:  dry oral mucosa    Neck:  supple, no JVD, no bruits    Respiratory: shallow resp, scattered rhonchi, no wheezing    Cardiovascular:  S1S2, tachy, no murmur    Gastrointestinal:  soft and benign + insulin pump    Genitourinary:  no garzon    Extremities:  moves all ext    Vascular:  + pedal pulses    Neurological:  nonfocal    S[facility]:  nor rash, [facility]    Lymph Nodes:  not enlarged    Musculoskeletal: normal mm mass and tone    LABS:                        12.8   10.85 )-----------( 245      WBC on ad:  19, 13.9             37.4     12-09    141  |  100  |  13  ----------------------------<  171<H>  4.2   |  24  |  0.9    Ca    8.5      09 Dec 2019 06:40  Mg     1.8     12-08  La 1.8, 1.3  TPro  5.7<L>  /  Alb  3.3<L>  /  TBili  0.8  /  DBili  x   /  AST  15  /  ALT  19  /  AlkPhos  59  12-08    blood C&S x2 neg  Urine neg  strept pneumoniae AG neg  Legionella  u Ag neg    urine + ketones    RADIOLOGY & ADDITIONAL TESTS:    EKG:  sinus tach, no acute changes  CXR:  retrocardiac linear opacity, R lung clear

## 2019-12-09 NOTE — CONSULT NOTE ADULT - ATTENDING COMMENTS
patient seen and examined, agree with above, still spiking T, CHEST CT, change ceftriaxone to cefepime/ procalcitonin swab nose for MRSA will follow
I have personally examined the patient and reviewed the documentation above.  Corrections and edits were made wherever needed.

## 2019-12-09 NOTE — CONSULT NOTE ADULT - SUBJECTIVE AND OBJECTIVE BOX
Patient is a 39y old  Male who presents with a chief complaint of fever with productive cough (08 Dec 2019 05:50)      HPI:  40 y/o m w/ pmhx of type 1 dm, hld presents with ~ 3 weeks of productive cough and fever. Pt started to get productive cough with clear to yellow sputum, associated with intermittent fever about 3 weeks ago and went to urgent care on  where he was  given Levaquin 750 mg and steroids. Pt condition improved after that for a few days but then he started to get similar symptoms again so he went to urgent care again today and he was told that he has pneumonia and he should come to ER. Pt states that he has productive cough associated with generalized body aches and fever. The rest of his family members also have cough but his symptoms are worse. Pt has lost about 7 pounds in the last few weeks. Pt denied any hx of fever, chills, nausea, vomiting, diarrhea, constipation, melena, pain in abdomen, sob, chest pain, increased urinary frequency, dysuria, headache, lightheadedness, dizziness, vertigo, localized weakness or numbness/tingling.    In the ER pt was found to have fever with T max of 102 F, HR of 137, /68 mmHg, RR : 19, SPO2 94% on RA. (07 Dec 2019 14:04)    Since adm, pt feels better with no fevers, but cough continues with brown color sputum production. No chest pain. No sore throat, running nose.     PAST MEDICAL & SURGICAL HISTORY:  Diabetes, type I  History of trigger finger      SOCIAL HX:   Smoking       quit 8 years ago                  ETOH                            Other    FAMILY HISTORY:  .  No cardiovascular or pulmonary family history     REVIEW OF SYSTEMS:    CONSTITUTIONAL:   no fever   no chills.  no weight gain   no weight loss    EYES:   no discharge,   no visual changes.    ENT:   Ears: no ear pain and no hearing problems.  Nose: no nasal congestion and no nasal drainage.  Mouth/Throat: no dysphagia,  no hoarseness and no throat pain.  Neck: no lumps, no pain, no stiffness and no swollen glands.    CARDIOVASCULAR:   no chest pain,   no swelling  no palpitaions  no syncope    RESPIRATORY:  no SOB,  no wheezing ,  no respiratory difficulty  no sputum production    GASTROINTESTINAL:   no abdominal pain,   no constipation,   no diarrhea,   no vomiting.    GENITOURINARY:  no dysuria,   no hematuria.    MUSCULOSKELETAL:   no back pain,   no musculoskeletal pain,  no weakness.    SKIN:   no jaundice,    no rashes.    NEURO:   no loss of consciousness,   no headache,   no weakness.    PSYCHIATRIC:   no known mental health issues  no anxiety  no depression    ALLERGIC/IMMUNOLOGIC:   No active allergic or immunologic issues      Allergies    No Known Allergies    Intolerances          PHYSICAL EXAM  Vital Signs Last 24 Hrs  T(C): 37.7 (09 Dec 2019 04:20), Max: 38.9 (08 Dec 2019 10:45)  T(F): 99.9 (09 Dec 2019 04:20), Max: 102.1 (08 Dec 2019 10:45)  HR: 101 (09 Dec 2019 04:20) (101 - 110)  BP: 130/76 (09 Dec 2019 04:20) (123/77 - 130/76)  BP(mean): --  RR: 20 (09 Dec 2019 04:20) (16 - 20)  SpO2: 96% (09 Dec 2019 04:20) (92% - 96%)    CONSTITUTIONAL:  Well nourished.  NAD    ENT:   Airway patent,   No thrush    EYES:   Clear bilaterally,   pupils equal,   round and reactive to light.    CARDIAC:   Normal rate,   regular rhythm.    no edema      CAROTID:   normal systolic impulse  no bruits    RESPIRATORY:   No wheezing  Nnormal chest expansion  Not tachypneic,  No use of accessory muscles  B/L basal crakles     GASTROINTESTINAL:  Abdomen soft,   non-tender,   no guarding,   + BS    GENITOURINARY  normal genitalia for sex  no edema    MUSCULOSKELETAL:   range of motion is not limited,  no clubbing, cyanosis    NEUROLOGICAL:   Alert and oriented   no motor deficits.  pertinent DTRs normal    SKIN:   Skin normal color for race,   No evidence of rash.    PSYCHIATRIC:   normal mood and affect.   no apparent risk to self or others.    HEME LYMPH:   No cervical  lymphadenopathy.  no inguinal lymphadenopathy          LABS:                          12.8   10.85 )-----------( 245      ( 09 Dec 2019 06:40 )             37.4                                               12-    141  |  100  |  13  ----------------------------<  171<H>  4.2   |  24  |  0.9    Ca    8.5      09 Dec 2019 06:40  Mg     1.8     12-08    TPro  5.7<L>  /  Alb  3.3<L>  /  TBili  0.8  /  DBili  x   /  AST  15  /  ALT  19  /  AlkPhos  59  12-08      PT/INR - ( 07 Dec 2019 10:52 )   PT: 15.90 sec;   INR: 1.39 ratio         PTT - ( 07 Dec 2019 10:52 )  PTT:31.3 sec                                       Urinalysis Basic - ( 07 Dec 2019 16:40 )    Color: Yellow / Appearance: Clear / S.043 / pH: x  Gluc: x / Ketone: Large  / Bili: Negative / Urobili: <2 mg/dL   Blood: x / Protein: 30 mg/dL / Nitrite: Negative   Leuk Esterase: Negative / RBC: 1 /HPF / WBC 2 /HPF   Sq Epi: x / Non Sq Epi: 1 /HPF / Bacteria: Negative                                                  LIVER FUNCTIONS - ( 08 Dec 2019 07:17 )  Alb: 3.3 g/dL / Pro: 5.7 g/dL / ALK PHOS: 59 U/L / ALT: 19 U/L / AST: 15 U/L / GGT: x                                                  Culture - Urine (collected 07 Dec 2019 16:40)  Source: .Urine Clean Catch (Midstream)  Final Report (09 Dec 2019 07:22):    No growth    Culture - Blood (collected 07 Dec 2019 16:21)  Source: .Blood None  Preliminary Report (09 Dec 2019 01:02):    No growth to date.    Culture - Blood (collected 07 Dec 2019 16:21)  Source: .Blood None  Preliminary Report (09 Dec 2019 01:02):    No growth to date.    Culture - Sputum (collected 07 Dec 2019 16:15)  Source: .Sputum Sputum  Gram Stain (08 Dec 2019 06:49):    Norovirus GI/GII polymorphonuclear leukocytes per low power field    ***Add Quantity Code*** Squamous epithelial cells per low power field    Moderate Gram positive cocci in pairs seen per oil power field    Moderate Gram Negative Rods seen per oil powerfield  Preliminary Report (08 Dec 2019 18:49):    Normal Respiratory Smitha present    Culture - Blood (collected 07 Dec 2019 10:52)  Source: .Blood Blood-Peripheral  Preliminary Report (08 Dec 2019 19:01):    No growth to date.    Culture - Blood (collected 07 Dec 2019 10:52)  Source: .Blood Blood-Peripheral  Preliminary Report (08 Dec 2019 19:01):    No growth to date.                                                    MEDICATIONS  (STANDING):  ALBUTerol    90 MICROgram(s) HFA Inhaler 1 Puff(s) Inhalation every 4 hours  albuterol/ipratropium for Nebulization 3 milliLiter(s) Nebulizer every 6 hours  atorvastatin 10 milliGRAM(s) Oral at bedtime  azithromycin  IVPB 500 milliGRAM(s) IV Intermittent every 24 hours  benzocaine 15 mG/menthol 3.6 mG (Sugar-Free) Lozenge 1 Lozenge Oral two times a day  cefTRIAXone   IVPB 1000 milliGRAM(s) IV Intermittent every 24 hours  enoxaparin Injectable 40 milliGRAM(s) SubCutaneous daily  tiotropium 18 MICROgram(s) Capsule 1 Capsule(s) Inhalation daily    MEDICATIONS  (PRN):  acetaminophen   Tablet .. 650 milliGRAM(s) Oral every 6 hours PRN Temp greater or equal to 38C (100.4F), Mild Pain (1 - 3)  guaiFENesin   Syrup  (Sugar-Free) 100 milliGRAM(s) Oral every 6 hours PRN Cough      X-Rays reviewed: bilateral interstitial markings     CXR interpreted by me: Patient is a 39y old  Male who presents with a chief complaint of fever with productive cough (08 Dec 2019 05:50)      HPI:  38 y/o m w/ pmhx of type 1 dm, hld presents with ~ 3 weeks of productive cough and fever. Pt started to get productive cough with clear to yellow sputum, associated with intermittent fever about 3 weeks ago and went to urgent care on  where he was  given Levaquin 750 mg and steroids. Pt condition improved after that for a few days but then he started to get similar symptoms again so he went to urgent care again today and he was told that he has pneumonia and he should come to ER. Pt states that he has productive cough associated with generalized body aches and fever. The rest of his family members also have cough but his symptoms are worse. Pt has lost about 7 pounds in the last few weeks. Pt denied any hx of fever, chills, nausea, vomiting, diarrhea, constipation, melena, pain in abdomen, sob, chest pain, increased urinary frequency, dysuria, headache, lightheadedness, dizziness, vertigo, localized weakness or numbness/tingling.    In the ER pt was found to have fever with T max of 102 F, HR of 137, /68 mmHg, RR : 19, SPO2 94% on RA. (07 Dec 2019 14:04)    Since adm, pt feels better with no fevers, but cough continues with brown color sputum production. No chest pain. No sore throat, running nose. no prior hx of lung disease    PAST MEDICAL & SURGICAL HISTORY:  Diabetes, type I  History of trigger finger      SOCIAL HX:   Smoking       quit 8 years ago                      FAMILY HISTORY:  .  No cardiovascular or pulmonary family history     REVIEW OF SYSTEMS:      no weakness.        Allergies    No Known Allergies    Intolerances          PHYSICAL EXAM  Vital Signs Last 24 Hrs  T(C): 37.7 (09 Dec 2019 04:20), Max: 38.9 (08 Dec 2019 10:45)  T(F): 99.9 (09 Dec 2019 04:20), Max: 102.1 (08 Dec 2019 10:45)  HR: 101 (09 Dec 2019 04:20) (101 - 110)  BP: 130/76 (09 Dec 2019 04:20) (123/77 - 130/76)  RR: 20 (09 Dec 2019 04:20) (16 - 20)  SpO2: 96% (09 Dec 2019 04:20) (92% - 96%)    CONSTITUTIONAL:  Well nourished.  NAD    ENT:   Airway patent,   No thrush    EYES:   Clear bilaterally,   pupils equal,   round and reactive to light.    CARDIAC:   Normal rate,   regular rhythm.    no edema      CAROTID:   normal systolic impulse  no bruits    RESPIRATORY:     B/L basal crakles L>R    GASTROINTESTINAL:  Abdomen soft,   non-tender,   no guarding,   + BS    GENITOURINARY  normal genitalia for sex  no edema    MUSCULOSKELETAL:   range of motion is not limited,  no clubbing, cyanosis    NEUROLOGICAL:   Alert and oriented   no motor deficits.  pertinent DTRs normal    SKIN:   Skin normal color for race,   No evidence of rash.    PSYCHIATRIC:   normal mood and affect.   no apparent risk to self or others.    HEME LYMPH:   No cervical  lymphadenopathy.  no inguinal lymphadenopathy          LABS:                          12.8   10.85 )-----------( 245      ( 09 Dec 2019 06:40 )             37.4                                               12    141  |  100  |  13  ----------------------------<  171<H>  4.2   |  24  |  0.9    Ca    8.5      09 Dec 2019 06:40  Mg     1.8     12-08    TPro  5.7<L>  /  Alb  3.3<L>  /  TBili  0.8  /  DBili  x   /  AST  15  /  ALT  19  /  AlkPhos  59  12-08      PT/INR - ( 07 Dec 2019 10:52 )   PT: 15.90 sec;   INR: 1.39 ratio         PTT - ( 07 Dec 2019 10:52 )  PTT:31.3 sec                                       Urinalysis Basic - ( 07 Dec 2019 16:40 )    Color: Yellow / Appearance: Clear / S.043 / pH: x  Gluc: x / Ketone: Large  / Bili: Negative / Urobili: <2 mg/dL   Blood: x / Protein: 30 mg/dL / Nitrite: Negative   Leuk Esterase: Negative / RBC: 1 /HPF / WBC 2 /HPF   Sq Epi: x / Non Sq Epi: 1 /HPF / Bacteria: Negative                                                  LIVER FUNCTIONS - ( 08 Dec 2019 07:17 )  Alb: 3.3 g/dL / Pro: 5.7 g/dL / ALK PHOS: 59 U/L / ALT: 19 U/L / AST: 15 U/L / GGT: x                                                  Culture - Urine (collected 07 Dec 2019 16:40)  Source: .Urine Clean Catch (Midstream)  Final Report (09 Dec 2019 07:22):    No growth    Culture - Blood (collected 07 Dec 2019 16:21)  Source: .Blood None  Preliminary Report (09 Dec 2019 01:02):    No growth to date.    Culture - Blood (collected 07 Dec 2019 16:21)  Source: .Blood None  Preliminary Report (09 Dec 2019 01:02):    No growth to date.    Culture - Sputum (collected 07 Dec 2019 16:15)  Source: .Sputum Sputum  Gram Stain (08 Dec 2019 06:49):    Norovirus GI/GII polymorphonuclear leukocytes per low power field    ***Add Quantity Code*** Squamous epithelial cells per low power field    Moderate Gram positive cocci in pairs seen per oil power field    Moderate Gram Negative Rods seen per oil powerfield  Preliminary Report (08 Dec 2019 18:49):    Normal Respiratory Smitha present    Culture - Blood (collected 07 Dec 2019 10:52)  Source: .Blood Blood-Peripheral  Preliminary Report (08 Dec 2019 19:01):    No growth to date.    Culture - Blood (collected 07 Dec 2019 10:52)  Source: .Blood Blood-Peripheral  Preliminary Report (08 Dec 2019 19:01):    No growth to date.                                                    MEDICATIONS  (STANDING):  ALBUTerol    90 MICROgram(s) HFA Inhaler 1 Puff(s) Inhalation every 4 hours  albuterol/ipratropium for Nebulization 3 milliLiter(s) Nebulizer every 6 hours  atorvastatin 10 milliGRAM(s) Oral at bedtime  azithromycin  IVPB 500 milliGRAM(s) IV Intermittent every 24 hours  benzocaine 15 mG/menthol 3.6 mG (Sugar-Free) Lozenge 1 Lozenge Oral two times a day  cefTRIAXone   IVPB 1000 milliGRAM(s) IV Intermittent every 24 hours  enoxaparin Injectable 40 milliGRAM(s) SubCutaneous daily  tiotropium 18 MICROgram(s) Capsule 1 Capsule(s) Inhalation daily    MEDICATIONS  (PRN):  acetaminophen   Tablet .. 650 milliGRAM(s) Oral every 6 hours PRN Temp greater or equal to 38C (100.4F), Mild Pain (1 - 3)  guaiFENesin   Syrup  (Sugar-Free) 100 milliGRAM(s) Oral every 6 hours PRN Cough      X-Rays reviewed: bilateral interstitial markings     CXR interpreted by me:

## 2019-12-10 LAB
ANION GAP SERPL CALC-SCNC: 14 MMOL/L — SIGNIFICANT CHANGE UP (ref 7–14)
APPEARANCE UR: CLEAR — SIGNIFICANT CHANGE UP
BASOPHILS # BLD AUTO: 0.05 K/UL — SIGNIFICANT CHANGE UP (ref 0–0.2)
BASOPHILS NFR BLD AUTO: 0.7 % — SIGNIFICANT CHANGE UP (ref 0–1)
BILIRUB UR-MCNC: NEGATIVE — SIGNIFICANT CHANGE UP
BUN SERPL-MCNC: 9 MG/DL — LOW (ref 10–20)
CALCIUM SERPL-MCNC: 8.4 MG/DL — LOW (ref 8.5–10.1)
CHLORIDE SERPL-SCNC: 98 MMOL/L — SIGNIFICANT CHANGE UP (ref 98–110)
CO2 SERPL-SCNC: 26 MMOL/L — SIGNIFICANT CHANGE UP (ref 17–32)
COLOR SPEC: SIGNIFICANT CHANGE UP
CREAT SERPL-MCNC: 0.9 MG/DL — SIGNIFICANT CHANGE UP (ref 0.7–1.5)
DIFF PNL FLD: NEGATIVE — SIGNIFICANT CHANGE UP
EOSINOPHIL # BLD AUTO: 0.1 K/UL — SIGNIFICANT CHANGE UP (ref 0–0.7)
EOSINOPHIL NFR BLD AUTO: 1.4 % — SIGNIFICANT CHANGE UP (ref 0–8)
ERYTHROCYTE [SEDIMENTATION RATE] IN BLOOD: 63 MM/HR — HIGH (ref 0–10)
GLUCOSE BLDC GLUCOMTR-MCNC: 216 MG/DL — HIGH (ref 70–99)
GLUCOSE BLDC GLUCOMTR-MCNC: 235 MG/DL — HIGH (ref 70–99)
GLUCOSE BLDC GLUCOMTR-MCNC: 245 MG/DL — HIGH (ref 70–99)
GLUCOSE BLDC GLUCOMTR-MCNC: 261 MG/DL — HIGH (ref 70–99)
GLUCOSE SERPL-MCNC: 225 MG/DL — HIGH (ref 70–99)
GLUCOSE UR QL: ABNORMAL
GRAM STN FLD: SIGNIFICANT CHANGE UP
HCT VFR BLD CALC: 33.6 % — LOW (ref 42–52)
HGB BLD-MCNC: 11.8 G/DL — LOW (ref 14–18)
IMM GRANULOCYTES NFR BLD AUTO: 0.6 % — HIGH (ref 0.1–0.3)
KETONES UR-MCNC: ABNORMAL
LEUKOCYTE ESTERASE UR-ACNC: NEGATIVE — SIGNIFICANT CHANGE UP
LYMPHOCYTES # BLD AUTO: 1.64 K/UL — SIGNIFICANT CHANGE UP (ref 1.2–3.4)
LYMPHOCYTES # BLD AUTO: 23.7 % — SIGNIFICANT CHANGE UP (ref 20.5–51.1)
MCHC RBC-ENTMCNC: 29.1 PG — SIGNIFICANT CHANGE UP (ref 27–31)
MCHC RBC-ENTMCNC: 35.1 G/DL — SIGNIFICANT CHANGE UP (ref 32–37)
MCV RBC AUTO: 83 FL — SIGNIFICANT CHANGE UP (ref 80–94)
MONOCYTES # BLD AUTO: 0.74 K/UL — HIGH (ref 0.1–0.6)
MONOCYTES NFR BLD AUTO: 10.7 % — HIGH (ref 1.7–9.3)
MRSA PCR RESULT.: NEGATIVE — SIGNIFICANT CHANGE UP
NEUTROPHILS # BLD AUTO: 4.35 K/UL — SIGNIFICANT CHANGE UP (ref 1.4–6.5)
NEUTROPHILS NFR BLD AUTO: 62.9 % — SIGNIFICANT CHANGE UP (ref 42.2–75.2)
NITRITE UR-MCNC: NEGATIVE — SIGNIFICANT CHANGE UP
NRBC # BLD: 0 /100 WBCS — SIGNIFICANT CHANGE UP (ref 0–0)
PH UR: 6.5 — SIGNIFICANT CHANGE UP (ref 5–8)
PLATELET # BLD AUTO: 235 K/UL — SIGNIFICANT CHANGE UP (ref 130–400)
POTASSIUM SERPL-MCNC: 4 MMOL/L — SIGNIFICANT CHANGE UP (ref 3.5–5)
POTASSIUM SERPL-SCNC: 4 MMOL/L — SIGNIFICANT CHANGE UP (ref 3.5–5)
PROT UR-MCNC: NEGATIVE — SIGNIFICANT CHANGE UP
RAPID RVP RESULT: SIGNIFICANT CHANGE UP
RBC # BLD: 4.05 M/UL — LOW (ref 4.7–6.1)
RBC # FLD: 12.4 % — SIGNIFICANT CHANGE UP (ref 11.5–14.5)
SODIUM SERPL-SCNC: 138 MMOL/L — SIGNIFICANT CHANGE UP (ref 135–146)
SP GR SPEC: 1.02 — SIGNIFICANT CHANGE UP (ref 1.01–1.02)
SPECIMEN SOURCE: SIGNIFICANT CHANGE UP
UROBILINOGEN FLD QL: SIGNIFICANT CHANGE UP
WBC # BLD: 6.92 K/UL — SIGNIFICANT CHANGE UP (ref 4.8–10.8)
WBC # FLD AUTO: 6.92 K/UL — SIGNIFICANT CHANGE UP (ref 4.8–10.8)

## 2019-12-10 RX ORDER — CEFEPIME 1 G/1
2000 INJECTION, POWDER, FOR SOLUTION INTRAMUSCULAR; INTRAVENOUS EVERY 8 HOURS
Refills: 0 | Status: DISCONTINUED | OUTPATIENT
Start: 2019-12-10 | End: 2019-12-13

## 2019-12-10 RX ORDER — IBUPROFEN 200 MG
400 TABLET ORAL ONCE
Refills: 0 | Status: COMPLETED | OUTPATIENT
Start: 2019-12-10 | End: 2019-12-10

## 2019-12-10 RX ORDER — AZITHROMYCIN 500 MG/1
500 TABLET, FILM COATED ORAL EVERY 24 HOURS
Refills: 0 | Status: DISCONTINUED | OUTPATIENT
Start: 2019-12-10 | End: 2019-12-10

## 2019-12-10 RX ORDER — VANCOMYCIN HCL 1 G
1750 VIAL (EA) INTRAVENOUS EVERY 12 HOURS
Refills: 0 | Status: DISCONTINUED | OUTPATIENT
Start: 2019-12-10 | End: 2019-12-10

## 2019-12-10 RX ORDER — ACETAMINOPHEN 500 MG
650 TABLET ORAL EVERY 6 HOURS
Refills: 0 | Status: DISCONTINUED | OUTPATIENT
Start: 2019-12-10 | End: 2019-12-13

## 2019-12-10 RX ADMIN — BENZOCAINE AND MENTHOL 1 LOZENGE: 5; 1 LIQUID ORAL at 06:00

## 2019-12-10 RX ADMIN — Medication 3 MILLILITER(S): at 10:24

## 2019-12-10 RX ADMIN — ENOXAPARIN SODIUM 40 MILLIGRAM(S): 100 INJECTION SUBCUTANEOUS at 11:15

## 2019-12-10 RX ADMIN — Medication 100 MILLIGRAM(S): at 21:37

## 2019-12-10 RX ADMIN — ATORVASTATIN CALCIUM 10 MILLIGRAM(S): 80 TABLET, FILM COATED ORAL at 06:00

## 2019-12-10 RX ADMIN — Medication 650 MILLIGRAM(S): at 12:35

## 2019-12-10 RX ADMIN — CEFEPIME 100 MILLIGRAM(S): 1 INJECTION, POWDER, FOR SOLUTION INTRAMUSCULAR; INTRAVENOUS at 13:53

## 2019-12-10 RX ADMIN — BENZOCAINE AND MENTHOL 1 LOZENGE: 5; 1 LIQUID ORAL at 17:33

## 2019-12-10 RX ADMIN — Medication 400 MILLIGRAM(S): at 15:30

## 2019-12-10 RX ADMIN — Medication 250 MILLIGRAM(S): at 05:59

## 2019-12-10 RX ADMIN — Medication 650 MILLIGRAM(S): at 13:50

## 2019-12-10 RX ADMIN — Medication 400 MILLIGRAM(S): at 14:27

## 2019-12-10 RX ADMIN — Medication 3 MILLILITER(S): at 14:33

## 2019-12-10 RX ADMIN — Medication 3 MILLILITER(S): at 20:04

## 2019-12-10 RX ADMIN — MEROPENEM 100 MILLIGRAM(S): 1 INJECTION INTRAVENOUS at 05:59

## 2019-12-10 RX ADMIN — CEFEPIME 100 MILLIGRAM(S): 1 INJECTION, POWDER, FOR SOLUTION INTRAMUSCULAR; INTRAVENOUS at 21:37

## 2019-12-10 NOTE — PROGRESS NOTE ADULT - SUBJECTIVE AND OBJECTIVE BOX
OVERNIGHT EVENTS: events noted, still spiking T, cough productive, CP / discomfort on deep inspiration. s/p chest ct    Vital Signs Last 24 Hrs  T(C): 37.8 (10 Dec 2019 04:53), Max: 38.6 (09 Dec 2019 12:27)  T(F): 100 (10 Dec 2019 04:53), Max: 101.4 (09 Dec 2019 12:27)  HR: 94 (10 Dec 2019 04:53) (94 - 111)  BP: 129/63 (10 Dec 2019 04:53) (118/55 - 129/77)  RR: 18 (10 Dec 2019 04:53) (18 - 20)  SpO2: 95% 2 l nc    PHYSICAL EXAMINATION:    GENERAL: The patient is awake and alert in no apparent distress.     HEENT: Head is normocephalic and atraumatic. Extraocular muscles are intact. Mucous membranes are moist.    NECK: Supple.    LUNGS: bl crackles    HEART: Regular rate and rhythm without murmur.    ABDOMEN: Soft, nontender, and nondistended.      EXTREMITIES: Without any cyanosis, clubbing, rash, lesions or edema.    NEUROLOGIC: Grossly intact.    SKIN: No ulceration or induration present.      LABS:                        12.8   10.85 )-----------( 245      ( 09 Dec 2019 06:40 )             37.4         141  |  100  |  13  ----------------------------<  171<H>  4.2   |  24  |  0.9    Ca    8.5      09 Dec 2019 06:40        Urinalysis Basic - ( 10 Dec 2019 03:00 )    Color: Light Yellow / Appearance: Clear / S.017 / pH: x  Gluc: x / Ketone: Small  / Bili: Negative / Urobili: <2 mg/dL   Blood: x / Protein: Negative / Nitrite: Negative   Leuk Esterase: Negative / RBC: x / WBC x   Sq Epi: x / Non Sq Epi: x / Bacteria: x                          MICROBIOLOGY:  Culture Results:   No growth ( @ 16:40)  Culture Results:   No growth to date. ( @ 16:21)  Culture Results:   No growth to date. ( @ 16:21)  Culture Results:   Normal Respiratory Smitha present ( @ 16:15)  Culture Results:   No growth to date. ( @ 10:52)  Culture Results:   No growth to date. ( @ 10:52)      MEDICATIONS  (STANDING):  ALBUTerol    90 MICROgram(s) HFA Inhaler 1 Puff(s) Inhalation every 4 hours  albuterol/ipratropium for Nebulization 3 milliLiter(s) Nebulizer every 6 hours  atorvastatin 10 milliGRAM(s) Oral at bedtime  azithromycin  IVPB 500 milliGRAM(s) IV Intermittent every 24 hours  benzocaine 15 mG/menthol 3.6 mG (Sugar-Free) Lozenge 1 Lozenge Oral two times a day  cefepime   IVPB 2000 milliGRAM(s) IV Intermittent every 8 hours  chlorhexidine 4% Liquid 1 Application(s) Topical <User Schedule>  enoxaparin Injectable 40 milliGRAM(s) SubCutaneous daily  tiotropium 18 MICROgram(s) Capsule 1 Capsule(s) Inhalation daily    MEDICATIONS  (PRN):  acetaminophen   Tablet .. 650 milliGRAM(s) Oral every 6 hours PRN Temp greater or equal to 38C (100.4F), Mild Pain (1 - 3)  guaiFENesin   Syrup  (Sugar-Free) 100 milliGRAM(s) Oral every 6 hours PRN Cough      RADIOLOGY & ADDITIONAL STUDIES:

## 2019-12-10 NOTE — PROGRESS NOTE ADULT - SUBJECTIVE AND OBJECTIVE BOX
LIZ ALVARADO  39y, Male  Allergy: No Known Allergies      CHIEF COMPLAINT: fever with productive cough (10 Dec 2019 08:03)      INTERVAL EVENTS/HPI  - No acute events overnight, CT with multifocal opacities  - T(F): , Max: 101.4 (19 @ 12:27) fever curve downtrending 100.8 this AM  - Denies any worsening symptoms  - Tolerating medication  - WBC Count: 6.92 (12-10-19 @ 07:48)  WBC Count: 10.85 (19 @ 06:40)  - Creatinine, Serum: 0.9 (19 @ 06:40)       ROS  General: Denies rigors, nightsweats  HEENT: Denies headache, rhinorrhea, sore throat, eye pain  CV: Denies CP, palpitations  PULM: Denies wheezing, hemoptysis  GI: Denies hematemesis, hematochezia, melena  : Denies discharge, hematuria  MSK: Denies arthralgias, myalgias  SKIN: Denies rash, lesions  NEURO: Denies paresthesias, weakness  PSYCH: Denies depression, anxiety    VITALS:  T(F): 100, Max: 101.4 (19 @ 12:27)  HR: 95  BP: 129/63  RR: 18Vital Signs Last 24 Hrs  T(C): 37.8 (10 Dec 2019 04:53), Max: 38.6 (09 Dec 2019 12:27)  T(F): 100 (10 Dec 2019 04:53), Max: 101.4 (09 Dec 2019 12:27)  HR: 95 (10 Dec 2019 09:17) (94 - 111)  BP: 129/63 (10 Dec 2019 04:53) (118/55 - 129/77)  BP(mean): --  RR: 18 (10 Dec 2019 04:53) (18 - 20)  SpO2: 95% (10 Dec 2019 09:17) (95% - 95%)    PHYSICAL EXAM:  Gen: NAD, resting in bed  HEENT: Normocephalic, atraumatic  Neck: supple, no lymphadenopathy  CV: Coarse BS  Lungs: decreased BS at bases, no fremitus  Abdomen: Soft, BS present  Ext: Warm, well perfused  Neuro: non focal, awake  Skin: no rash, no erythema  Lines: no phlebitis    FH: Non-contributory  Social Hx: Non-contributory    TESTS & MEASUREMENTS:                        11.8   6.92  )-----------( 235      ( 10 Dec 2019 07:48 )             33.6         141  |  100  |  13  ----------------------------<  171<H>  4.2   |  24  |  0.9    Ca    8.5      09 Dec 2019 06:40          Urinalysis Basic - ( 10 Dec 2019 03:00 )    Color: Light Yellow / Appearance: Clear / S.017 / pH: x  Gluc: x / Ketone: Small  / Bili: Negative / Urobili: <2 mg/dL   Blood: x / Protein: Negative / Nitrite: Negative   Leuk Esterase: Negative / RBC: x / WBC x   Sq Epi: x / Non Sq Epi: x / Bacteria: x        Culture - Urine (collected 19 @ 16:40)  Source: .Urine Clean Catch (Midstream)  Final Report (19 @ 07:22):    No growth    Culture - Blood (collected 19 @ 16:21)  Source: .Blood None  Preliminary Report (19 @ 01:02):    No growth to date.    Culture - Blood (collected 19 @ 16:21)  Source: .Blood None  Preliminary Report (19 @ 01:02):    No growth to date.    Culture - Sputum (collected 19 @ 16:15)  Source: .Sputum Sputum  Gram Stain (19 @ 06:49):    Norovirus GI/GII polymorphonuclear leukocytes per low power field    ***Add Quantity Code*** Squamous epithelial cells per low power field    Moderate Gram positive cocci in pairs seen per oil power field    Moderate Gram Negative Rods seen per oil powerfield  Final Report (19 @ 17:05):    Normal Respiratory Smitha present    Culture - Blood (collected 19 @ 10:52)  Source: .Blood Blood-Peripheral  Preliminary Report (19 @ 19:01):    No growth to date.    Culture - Blood (collected 19 @ 10:52)  Source: .Blood Blood-Peripheral  Preliminary Report (19 @ 19:01):    No growth to date.        Lactate, Blood: 1.3 mmol/L (19 @ 14:00)  Blood Gas Venous - Lactate: 1.8 mmoL/L (19 @ 11:16)  Lactate, Blood: 1.8 mmol/L (19 @ 10:52)      INFECTIOUS DISEASES TESTING  MRSA PCR Result.: Negative (12-10-19 @ 06:00)  Streptococcus Pneumoniae Ag Urine: Negative (19 @ 16:40)  Legionella Antigen, Urine: Negative (19 @ 16:40)  HIV-1/2 Combo Result: Nonreact (19 @ 16:21)      RADIOLOGY & ADDITIONAL TESTS:  I have personally reviewed the last Chest xray  CXR  Xray Chest 2 Views PA/Lat:   EXAM:  XR CHEST PA LAT 2V            PROCEDURE DATE:  2019            INTERPRETATION:  Clinical History / Reason for exam: Fever    Comparison : Chest radiograph 2010.    Technique/Positioning: Frontal and lateral views.    Impression:    Support devices: None.    Cardiac/mediastinum/hilum: Unremarkable.    Lung parenchyma/Pleura: Retrocardiac linear opacity, likely atelectasis.   Right lung is unremarkable. No evidence of pneumothorax.    Skeleton/soft tissues: Stable                    VIJAY COVARRUBIAS M.D., ATTENDING RADIOLOGIST  This document has been electronically signed. Dec  7 2019  1:22PM             (19 @ 11:34)      CT      CARDIOLOGY TESTING  12 Lead ECG:   Ventricular Rate 126 BPM    Atrial Rate 126 BPM    P-R Interval 150 ms    QRS Duration 88 ms    Q-T Interval 292 ms    QTC Calculation(Bezet) 422 ms    P Axis 48 degrees    R Axis 43 degrees    T Axis 45 degrees    Diagnosis Line Sinus tachycardia  Otherwise normal ECG    Confirmed by ESTELLA HERNANDEZ MD ([id number]) on 2019 1:52:13 PM (19 @ 11:23)      MEDICATIONS  ALBUTerol    90 MICROgram(s) HFA Inhaler 1  albuterol/ipratropium for Nebulization 3  atorvastatin 10  benzocaine 15 mG/menthol 3.6 mG (Sugar-Free) Lozenge 1  cefepime   IVPB 2000  chlorhexidine 4% Liquid 1  enoxaparin Injectable 40  levoFLOXacin IVPB 750  levoFLOXacin IVPB   tiotropium 18 MICROgram(s) Capsule 1  vancomycin  IVPB 1750      ANTIBIOTICS:  cefepime   IVPB 2000 milliGRAM(s) IV Intermittent every 8 hours  levoFLOXacin IVPB 750 milliGRAM(s) IV Intermittent once  levoFLOXacin IVPB      vancomycin  IVPB 1750 milliGRAM(s) IV Intermittent every 12 hours      All available historical records have been reviewed

## 2019-12-10 NOTE — PROGRESS NOTE ADULT - SUBJECTIVE AND OBJECTIVE BOX
SUBJECTIVE:    Patient is a 39y old Male who presents with a chief complaint of fever with productive cough (10 Dec 2019 09:28)    Overnight Events: Patient is still spiking fevers, saturating well on NC 2 L, desaturating on RA.  He is coughing with brown, yellowish expectorations, complaining of pleuritic chest pain.  VS are stable except the fever, no other complaints.    PAST MEDICAL & SURGICAL HISTORY  Diabetes, type I  History of trigger finger    SOCIAL HISTORY:  Negative for smoking/alcohol/drug use.     ALLERGIES:  No Known Allergies    MEDICATIONS:  STANDING MEDICATIONS  ALBUTerol    90 MICROgram(s) HFA Inhaler 1 Puff(s) Inhalation every 4 hours  albuterol/ipratropium for Nebulization 3 milliLiter(s) Nebulizer every 6 hours  atorvastatin 10 milliGRAM(s) Oral at bedtime  benzocaine 15 mG/menthol 3.6 mG (Sugar-Free) Lozenge 1 Lozenge Oral two times a day  cefepime   IVPB 2000 milliGRAM(s) IV Intermittent every 8 hours  chlorhexidine 4% Liquid 1 Application(s) Topical <User Schedule>  enoxaparin Injectable 40 milliGRAM(s) SubCutaneous daily  levoFLOXacin IVPB 750 milliGRAM(s) IV Intermittent once  levoFLOXacin IVPB      tiotropium 18 MICROgram(s) Capsule 1 Capsule(s) Inhalation daily    PRN MEDICATIONS  acetaminophen   Tablet .. 650 milliGRAM(s) Oral every 6 hours PRN  guaiFENesin   Syrup  (Sugar-Free) 100 milliGRAM(s) Oral every 6 hours PRN    VITALS:   T(F): 100, Max: 101.4 (-19 @ 12:27)  HR: 95 (94 - 111)  BP: 129/63 (118/55 - 129/77)  RR: 18 (18 - 20)  SpO2: 95% (95% - 95%)    LABS:                        11.8   6.92  )-----------( 235      ( 10 Dec 2019 07:48 )             33.6     12-10    138  |  98  |  9<L>  ----------------------------<  225<H>  4.0   |  26  |  0.9    Ca    8.4<L>      10 Dec 2019 07:48        Urinalysis Basic - ( 10 Dec 2019 03:00 )    Color: Light Yellow / Appearance: Clear / S.017 / pH: x  Gluc: x / Ketone: Small  / Bili: Negative / Urobili: <2 mg/dL   Blood: x / Protein: Negative / Nitrite: Negative   Leuk Esterase: Negative / RBC: x / WBC x   Sq Epi: x / Non Sq Epi: x / Bacteria: x            Culture - Urine (collected 07 Dec 2019 16:40)  Source: .Urine Clean Catch (Midstream)  Final Report (09 Dec 2019 07:22):    No growth    Culture - Blood (collected 07 Dec 2019 16:21)  Source: .Blood None  Preliminary Report (09 Dec 2019 01:02):    No growth to date.    Culture - Blood (collected 07 Dec 2019 16:21)  Source: .Blood None  Preliminary Report (09 Dec 2019 01:02):    No growth to date.    Culture - Sputum (collected 07 Dec 2019 16:15)  Source: .Sputum Sputum  Gram Stain (08 Dec 2019 06:49):    Norovirus GI/GII polymorphonuclear leukocytes per low power field    ***Add Quantity Code*** Squamous epithelial cells per low power field    Moderate Gram positive cocci in pairs seen per oil power field    Moderate Gram Negative Rods seen per oil powerfield  Final Report (09 Dec 2019 17:05):    Normal Respiratory Smitha present    Culture - Blood (collected 07 Dec 2019 10:52)  Source: .Blood Blood-Peripheral  Preliminary Report (08 Dec 2019 19:01):    No growth to date.    Culture - Blood (collected 07 Dec 2019 10:52)  Source: .Blood Blood-Peripheral  Preliminary Report (08 Dec 2019 19:01):    No growth to date.                PHYSICAL EXAM:  GEN: NAD, comfortable  LUNGS: CTAB, no w/r/r  HEART: RRR, s1 and s2 appreciated, no m/r/g  ABD: soft, NT/ND, +BS  EXT: no edema, PP b/l  NEURO: AAOX3

## 2019-12-10 NOTE — PROGRESS NOTE ADULT - ASSESSMENT
Impression    - Highly suspect bacterial pneumonia ( started with viral symptoms had swab neg in urgent care ( risk factors DM/ recent abx usage), [facility] WBC on admission with L shift still spiking T, chest ct reviewed    Recommendations    Keep sats > 92%   Duonebs prn  Cefepime/ levaquin ( or azithro), if MRSA neg dc vanco  procalcitonin level and fungitell  Repeat sputum culture  RVP   DVT px   BS control  OOB to chair  manda lawson

## 2019-12-10 NOTE — PROGRESS NOTE ADULT - ASSESSMENT
Sepsis ( with WBC of 19, temp 102.1, tachy 137/min,) probable PNA  Hx of Type I DM   Hx of DLD  Hx of trigger finger    CXR reviewed + retrocardiac linear opacity  CT of the chest noncontrast P  cont IV ABx  blood C&S neg x 2  Legionella u Ag neg  strep pneumoniae Ag neg  Resp Viral Panel  Pulmonary consult  ID consult  WBC trending down, Tmax today 100  encourage oral fluids Sepsis ( with WBC of 19, temp 102.1, tachy 137/min,)  PNA  Hx of Type I DM   Hx of DLD  Hx of trigger finger    CXR reviewed + retrocardiac linear opacity  CT of the chest noncontrast: prlim report + BL opacities  cont IV ABx:  Cefepime 2 gms q 8, Levaquin 750mg  q 24  blood C&S neg x 2  repeat sputum C&S  Legionella u Ag neg  strep pneumoniae Ag neg  Nare MRSA neg  Resp Viral Panel  Fungitell  Pulmonary consult  ID consult and ff up appreciated  WBC trending down 19 to 6.9, cont spikes of temp, cough with pleuritic CP  encourage oral fluids

## 2019-12-10 NOTE — PROGRESS NOTE ADULT - ASSESSMENT
39y Male with a PMH of DMI and HLD presents with a chief complaint of intermittent fever (Tmax 103) and productive cough (clear/yellow phlegm) for the past 3 weeks. Initially seen by urgent care on 11/23 and was on Levaquin and steroids with only temporary improvement in fever and worsening of cough. Pt diagnosed with pneumonia at urgent care on 12/7 and now presents to ED for further work-up    IMPRESSION  # Sepsis secondary to suspected atypical PNA, r/o GNR PNA r/o inflammatory    CT Chest with multifocal opacities    Discussed with lab, admission sputum cx neg, no PMNS (norovirus is an error)    Blood & Urine cxs NGTD , Flu/RSV neg, legionella/strep neg, HIV neg    No real exposures, children at home sick, no vaping  #Sepsis on admission (T 102, , WBC 19.22) Tmax 102.1      RECOMMENDATIONS  - Cefepime 2g q8h IV  - Levaquin 750mg q24h IV  - MRSA PCR< if neg, D/C VANC  - F/u mycoplasma IgM , RVP, procalcitonin, ESR, CRP, ANCA  - Send fungitell, aspergillus galactomannan  - Repeat sputum culture  - Pulm following    Spectra [phone]

## 2019-12-10 NOTE — PROGRESS NOTE ADULT - ASSESSMENT
40 y/o m w/ pmhx of type 1 dm, hld presents with ~ 3 weeks of productive cough and fever persistent despite levaquin and steroid course.    # Sepsis likely due to PNA  - Sepsis on admission  - Still spiking fevers, leukocytosis resolved  - CXR did not show clear opacity  - CAT chest showed b/l infiltrates, pending official read  - started on azithromycin and ceftriaxone initially  - Still spiking fever, cefepime and levaquin  - vanco d/medardo after MRSA negative  - Procalcitonin, ESR, CRP, Fungitell, Mycoplasma IgM, ANCA pending  - pulm and ID following  - Blood Cx and urine cx negative  - Sputum Cx showed normal respiratory gareth  - urine legionella Ag, Strep Pneumonia Ag, HIV, Flu A/B RSV negative    # Sinus tachycardia   - Likely from fever  - TSH normal    # DM Type 1  - Pt has insulin pump and wants to continue using it.  - monitor finger stick AC+ HS.    # DLD  - c/w statins    DVT PPx:  Lovenox 40 mg s/c  GI PPX:  not indicated  Diet: DASH/Carb consistent  Activity: Increase as tolerated  Dispo: from home, no needs  Code Status: full code

## 2019-12-11 LAB
ALBUMIN SERPL ELPH-MCNC: 3.3 G/DL — LOW (ref 3.5–5.2)
ALP SERPL-CCNC: 66 U/L — SIGNIFICANT CHANGE UP (ref 30–115)
ALT FLD-CCNC: 33 U/L — SIGNIFICANT CHANGE UP (ref 0–41)
ANION GAP SERPL CALC-SCNC: 15 MMOL/L — HIGH (ref 7–14)
AST SERPL-CCNC: 18 U/L — SIGNIFICANT CHANGE UP (ref 0–41)
AUTO DIFF PNL BLD: NEGATIVE — SIGNIFICANT CHANGE UP
BASOPHILS # BLD AUTO: 0.05 K/UL — SIGNIFICANT CHANGE UP (ref 0–0.2)
BASOPHILS NFR BLD AUTO: 0.6 % — SIGNIFICANT CHANGE UP (ref 0–1)
BILIRUB SERPL-MCNC: 0.5 MG/DL — SIGNIFICANT CHANGE UP (ref 0.2–1.2)
BUN SERPL-MCNC: 12 MG/DL — SIGNIFICANT CHANGE UP (ref 10–20)
C-ANCA SER-ACNC: NEGATIVE — SIGNIFICANT CHANGE UP
CALCIUM SERPL-MCNC: 8.9 MG/DL — SIGNIFICANT CHANGE UP (ref 8.5–10.1)
CHLORIDE SERPL-SCNC: 98 MMOL/L — SIGNIFICANT CHANGE UP (ref 98–110)
CO2 SERPL-SCNC: 24 MMOL/L — SIGNIFICANT CHANGE UP (ref 17–32)
CREAT SERPL-MCNC: 0.8 MG/DL — SIGNIFICANT CHANGE UP (ref 0.7–1.5)
CRP SERPL-MCNC: 20.6 MG/DL — HIGH (ref 0–0.4)
CULTURE RESULTS: NO GROWTH — SIGNIFICANT CHANGE UP
EOSINOPHIL # BLD AUTO: 0.12 K/UL — SIGNIFICANT CHANGE UP (ref 0–0.7)
EOSINOPHIL NFR BLD AUTO: 1.4 % — SIGNIFICANT CHANGE UP (ref 0–8)
GLUCOSE BLDC GLUCOMTR-MCNC: 158 MG/DL — HIGH (ref 70–99)
GLUCOSE BLDC GLUCOMTR-MCNC: 198 MG/DL — HIGH (ref 70–99)
GLUCOSE BLDC GLUCOMTR-MCNC: 204 MG/DL — HIGH (ref 70–99)
GLUCOSE BLDC GLUCOMTR-MCNC: 226 MG/DL — HIGH (ref 70–99)
GLUCOSE SERPL-MCNC: 230 MG/DL — HIGH (ref 70–99)
HCT VFR BLD CALC: 36 % — LOW (ref 42–52)
HGB BLD-MCNC: 12.4 G/DL — LOW (ref 14–18)
IMM GRANULOCYTES NFR BLD AUTO: 0.8 % — HIGH (ref 0.1–0.3)
LYMPHOCYTES # BLD AUTO: 1.43 K/UL — SIGNIFICANT CHANGE UP (ref 1.2–3.4)
LYMPHOCYTES # BLD AUTO: 17.2 % — LOW (ref 20.5–51.1)
MCHC RBC-ENTMCNC: 28.3 PG — SIGNIFICANT CHANGE UP (ref 27–31)
MCHC RBC-ENTMCNC: 34.4 G/DL — SIGNIFICANT CHANGE UP (ref 32–37)
MCV RBC AUTO: 82.2 FL — SIGNIFICANT CHANGE UP (ref 80–94)
MONOCYTES # BLD AUTO: 0.72 K/UL — HIGH (ref 0.1–0.6)
MONOCYTES NFR BLD AUTO: 8.7 % — SIGNIFICANT CHANGE UP (ref 1.7–9.3)
NEUTROPHILS # BLD AUTO: 5.91 K/UL — SIGNIFICANT CHANGE UP (ref 1.4–6.5)
NEUTROPHILS NFR BLD AUTO: 71.3 % — SIGNIFICANT CHANGE UP (ref 42.2–75.2)
NRBC # BLD: 0 /100 WBCS — SIGNIFICANT CHANGE UP (ref 0–0)
P-ANCA SER-ACNC: NEGATIVE — SIGNIFICANT CHANGE UP
PLATELET # BLD AUTO: 256 K/UL — SIGNIFICANT CHANGE UP (ref 130–400)
POTASSIUM SERPL-MCNC: 3.9 MMOL/L — SIGNIFICANT CHANGE UP (ref 3.5–5)
POTASSIUM SERPL-SCNC: 3.9 MMOL/L — SIGNIFICANT CHANGE UP (ref 3.5–5)
PROCALCITONIN SERPL-MCNC: 1.76 NG/ML — HIGH (ref 0.02–0.1)
PROT SERPL-MCNC: 5.8 G/DL — LOW (ref 6–8)
RBC # BLD: 4.38 M/UL — LOW (ref 4.7–6.1)
RBC # FLD: 12.3 % — SIGNIFICANT CHANGE UP (ref 11.5–14.5)
SODIUM SERPL-SCNC: 137 MMOL/L — SIGNIFICANT CHANGE UP (ref 135–146)
SPECIMEN SOURCE: SIGNIFICANT CHANGE UP
WBC # BLD: 8.3 K/UL — SIGNIFICANT CHANGE UP (ref 4.8–10.8)
WBC # FLD AUTO: 8.3 K/UL — SIGNIFICANT CHANGE UP (ref 4.8–10.8)

## 2019-12-11 RX ORDER — TUBERCULIN PURIFIED PROTEIN DERIVATIVE 5 [IU]/.1ML
5 INJECTION, SOLUTION INTRADERMAL ONCE
Refills: 0 | Status: COMPLETED | OUTPATIENT
Start: 2019-12-11 | End: 2019-12-11

## 2019-12-11 RX ADMIN — Medication 3 MILLILITER(S): at 09:18

## 2019-12-11 RX ADMIN — Medication 3 MILLILITER(S): at 14:29

## 2019-12-11 RX ADMIN — BENZOCAINE AND MENTHOL 1 LOZENGE: 5; 1 LIQUID ORAL at 05:09

## 2019-12-11 RX ADMIN — Medication 100 MILLIGRAM(S): at 21:49

## 2019-12-11 RX ADMIN — BENZOCAINE AND MENTHOL 1 LOZENGE: 5; 1 LIQUID ORAL at 17:00

## 2019-12-11 RX ADMIN — CEFEPIME 100 MILLIGRAM(S): 1 INJECTION, POWDER, FOR SOLUTION INTRAMUSCULAR; INTRAVENOUS at 21:45

## 2019-12-11 RX ADMIN — ATORVASTATIN CALCIUM 10 MILLIGRAM(S): 80 TABLET, FILM COATED ORAL at 05:09

## 2019-12-11 RX ADMIN — Medication 3 MILLILITER(S): at 20:06

## 2019-12-11 RX ADMIN — ENOXAPARIN SODIUM 40 MILLIGRAM(S): 100 INJECTION SUBCUTANEOUS at 11:13

## 2019-12-11 RX ADMIN — CEFEPIME 100 MILLIGRAM(S): 1 INJECTION, POWDER, FOR SOLUTION INTRAMUSCULAR; INTRAVENOUS at 13:03

## 2019-12-11 RX ADMIN — CEFEPIME 100 MILLIGRAM(S): 1 INJECTION, POWDER, FOR SOLUTION INTRAMUSCULAR; INTRAVENOUS at 05:09

## 2019-12-11 RX ADMIN — TUBERCULIN PURIFIED PROTEIN DERIVATIVE 5 UNIT(S): 5 INJECTION, SOLUTION INTRADERMAL at 14:31

## 2019-12-11 RX ADMIN — Medication 650 MILLIGRAM(S): at 16:18

## 2019-12-11 RX ADMIN — Medication 100 MILLIGRAM(S): at 05:09

## 2019-12-11 NOTE — PROGRESS NOTE ADULT - ASSESSMENT
39y Male with a PMH of DMI and HLD presents with a chief complaint of intermittent fever (Tmax 103) and productive cough (clear/yellow phlegm) for the past 3 weeks. Initially seen by urgent care on 11/23 and was on Levaquin and steroids with only temporary improvement in fever and worsening of cough. Pt diagnosed with pneumonia at urgent care on 12/7 and now presents to ED for further work-up    IMPRESSION  # Sepsis secondary to suspected atypical PNA, r/o GNR PNA r/o inflammatory    Procalcitonin, Serum: 1.76: (12.10.19 @ 11:42), RVP neg    CT Chest with multifocal opacities    Discussed with lab, admission sputum cx neg & repeat 12/10 neg, no PMNS (norovirus is an error)    ESR 63 CRP 20    Blood & Urine cxs NGTD , Flu/RSV neg, legionella/strep neg, HIV neg    No real exposures, children at home sick, no vaping  #Sepsis on admission (T 102, , WBC 19.22) Tmax 102.1      RECOMMENDATIONS  - Cefepime 2g q8h IV  - Levaquin 750mg q24h IV  - F/u mycoplasma IgM ,ANCA   - Send fungitell, aspergillus galactomannan  - Pulm following  - Likely D/C with midline and IV cefepime     Spectra [phone]

## 2019-12-11 NOTE — PROGRESS NOTE ADULT - SUBJECTIVE AND OBJECTIVE BOX
LIZ ALVARADO  39y, Male  Allergy: No Known Allergies      CHIEF COMPLAINT: fever with productive cough (11 Dec 2019 08:43)      INTERVAL EVENTS/HPI  - No acute events overnight, sputum cx nl gareth  - T(F): , Max: 102 (12-10-19 @ 13:54)  - Denies any worsening symptoms  - Tolerating medication  - WBC Count: 8.30 (19 @ 08:54)  WBC Count: 6.92 (12-10-19 @ 07:48)  - Creatinine, Serum: 0.8 (19 @ 08:54)  Creatinine, Serum: 0.9 (12-10-19 @ 07:48)       ROS  General: Denies rigors, nightsweats  HEENT: Denies headache, rhinorrhea, sore throat, eye pain  CV: Denies CP, palpitations  PULM: Denies wheezing, hemoptysis  GI: Denies hematemesis, hematochezia, melena  : Denies discharge, hematuria  MSK: Denies arthralgias, myalgias  SKIN: Denies rash, lesions  NEURO: Denies paresthesias, weakness  PSYCH: Denies depression, anxiety    VITALS:  T(F): 99.2, Max: 102 (12-10-19 @ 13:54)  HR: 107  BP: 130/70  RR: 19Vital Signs Last 24 Hrs  T(C): 37.3 (11 Dec 2019 05:17), Max: 38.9 (10 Dec 2019 13:54)  T(F): 99.2 (11 Dec 2019 05:17), Max: 102 (10 Dec 2019 13:54)  HR: 107 (11 Dec 2019 05:17) (107 - 110)  BP: 130/70 (11 Dec 2019 05:17) (117/63 - 132/64)  BP(mean): --  RR: 19 (11 Dec 2019 05:17) (18 - 19)  SpO2: 95% (11 Dec 2019 08:10) (90% - 95%)    PHYSICAL EXAM:  Gen: NAD, resting in bed  HEENT: Normocephalic, atraumatic  Neck: supple, no lymphadenopathy  CV: Coarse BS  Lungs: decreased BS at bases, no fremitus  Abdomen: Soft, BS present  Ext: Warm, well perfused  Neuro: non focal, awake  Skin: no rash, no erythema  Lines: no phlebitis      FH: Non-contributory  Social Hx: Non-contributory    TESTS & MEASUREMENTS:                        12.4   8.30  )-----------( 256      ( 11 Dec 2019 08:54 )             36.0         137  |  98  |  12  ----------------------------<  230<H>  3.9   |  24  |  0.8    Ca    8.9      11 Dec 2019 08:54    TPro  5.8<L>  /  Alb  3.3<L>  /  TBili  0.5  /  DBili  x   /  AST  18  /  ALT  33  /  AlkPhos  66      eGFR if : 131 mL/min/1.73M2 (19 @ 08:54)  eGFR if Non African American: 113 mL/min/1.73M2 (19 @ 08:54)    LIVER FUNCTIONS - ( 11 Dec 2019 08:54 )  Alb: 3.3 g/dL / Pro: 5.8 g/dL / ALK PHOS: 66 U/L / ALT: 33 U/L / AST: 18 U/L / GGT: x           Urinalysis Basic - ( 10 Dec 2019 03:00 )    Color: Light Yellow / Appearance: Clear / S.017 / pH: x  Gluc: x / Ketone: Small  / Bili: Negative / Urobili: <2 mg/dL   Blood: x / Protein: Negative / Nitrite: Negative   Leuk Esterase: Negative / RBC: x / WBC x   Sq Epi: x / Non Sq Epi: x / Bacteria: x        Culture - Sputum (collected 12-10-19 @ 03:00)  Source: .Sputum Sputum  Gram Stain (12-10-19 @ 16:18):    Rare polymorphonuclear leukocytes per low power field    Rare Squamous epithelial cells per low power field    Few Gram Negative Rods per oil power field    Rare Gram Negative Diplococci per oil power field    Rare Gram Positive Cocci in Clusters per oil power field  Preliminary Report (19 @ 08:24):    Normal Respiratory Gareth present    Culture - Urine (collected 19 @ 16:40)  Source: .Urine Clean Catch (Midstream)  Final Report (19 @ 07:22):    No growth    Culture - Blood (collected 19 @ 16:21)  Source: .Blood None  Preliminary Report (19 @ 01:02):    No growth to date.    Culture - Blood (collected 19 @ 16:21)  Source: .Blood None  Preliminary Report (19 @ 01:02):    No growth to date.    Culture - Sputum (collected 19 @ 16:15)  Source: .Sputum Sputum  Gram Stain (19 @ 06:49):    Norovirus GI/GII polymorphonuclear leukocytes per low power field    ***Add Quantity Code*** Squamous epithelial cells per low power field    Moderate Gram positive cocci in pairs seen per oil power field    Moderate Gram Negative Rods seen per oil powerfield  Final Report (19 @ 17:05):    Normal Respiratory Gareth present    Culture - Blood (collected 19 @ 10:52)  Source: .Blood Blood-Peripheral  Preliminary Report (19 @ 19:01):    No growth to date.    Culture - Blood (collected 19 @ 10:52)  Source: .Blood Blood-Peripheral  Preliminary Report (19 @ 19:01):    No growth to date.        Lactate, Blood: 1.3 mmol/L (19 @ 14:00)  Blood Gas Venous - Lactate: 1.8 mmoL/L (19 @ 11:16)  Lactate, Blood: 1.8 mmol/L (19 @ 10:52)      INFECTIOUS DISEASES TESTING  Rapid RVP Result: NotDetec (12-10-19 @ 08:12)  MRSA PCR Result.: Negative (12-10-19 @ 06:00)  Streptococcus Pneumoniae Ag Urine: Negative (19 @ 16:40)  Legionella Antigen, Urine: Negative (19 @ 16:40)  HIV-1/2 Combo Result: Nonreact (19 @ 16:21)      RADIOLOGY & ADDITIONAL TESTS:  I have personally reviewed the last Chest xray  CXR      CT  CT Chest No Cont:   EXAM:  CT CHEST            PROCEDURE DATE:  2019            INTERPRETATION:  Reason for Exam:  Productive cough. Shortness of breath.  CT of the chest was performed from the thoracic inlet to the level of the   adrenal glands without contrastinjection. Coronal and sagittal images   have been submitted.    Comparison: Chest x-ray-2019.    No prior thoracic CT scans    Findings:     Tubes/Lines: None    Lungs, Pleura, and Airways: No endobronchial obstruction.    Multi lobar patchy areas of nodular consolidation consistent with   pneumonia are noted involving right upper lobe, right middle lobe, both   lower lobes.    Trace bilateral pleural effusions, slightly larger on the right.   Mediastinum/Lymph Nodes:Approximate 1.1 cm right subcarinal lymph node   (4/132)    Subcentimeter paratracheal lymph nodes.    Heart/Great Vessels: No circumferential pericardial effusions. Fluid   within superior pericardial recess. Extensive coronary artery   calcifications consistent with coronary artery disease. Great vessels are   normal in caliber    Abdomen: Visualized non enhanced upper abdominal organs are unremarkable.    Bones and soft tissues: No suspicious focal osseous lesions.    IMPRESSION:      Multi lobar patchy areas of nodular consolidation, consistent with   pneumonia (involving right upper lobe, right middle lobe, both lower   lobes).    Trace bilateral pleural effusions, slightly larger on the right.     Likely reactive 1.1 cm right subcarinal lymph node.      Extensive coronary artery calcifications, consistent with coronary artery   disease.                    CHRIS GRECO M.D., ATTENDING RADIOLOGIST  This document has been electronically signed. Dec 10 2019  9:45AM             (19 @ 19:28)      CARDIOLOGY TESTING  12 Lead ECG:   Ventricular Rate 126 BPM    Atrial Rate 126 BPM    P-R Interval 150 ms    QRS Duration 88 ms    Q-T Interval 292 ms    QTC Calculation(Bezet) 422 ms    P Axis 48 degrees    R Axis 43 degrees    T Axis 45 degrees    Diagnosis Line Sinus tachycardia  Otherwise normal ECG    Confirmed by MARY GILBERT, ESTELLA ([id number]) on 2019 1:52:13 PM (19 @ 11:23)      MEDICATIONS  ALBUTerol    90 MICROgram(s) HFA Inhaler 1  albuterol/ipratropium for Nebulization 3  atorvastatin 10  benzocaine 15 mG/menthol 3.6 mG (Sugar-Free) Lozenge 1  cefepime   IVPB 2000  chlorhexidine 4% Liquid 1  enoxaparin Injectable 40  levoFLOXacin IVPB 750  levoFLOXacin IVPB   tiotropium 18 MICROgram(s) Capsule 1      ANTIBIOTICS:  cefepime   IVPB 2000 milliGRAM(s) IV Intermittent every 8 hours  levoFLOXacin IVPB 750 milliGRAM(s) IV Intermittent every 24 hours  levoFLOXacin IVPB          All available historical records have been reviewed

## 2019-12-11 NOTE — PROGRESS NOTE ADULT - SUBJECTIVE AND OBJECTIVE BOX
LIZ ALVARADO 40yo  Male came to the ER for c/o cont cough with fever of 102.1, productive cough and leukocytosis of 19.  The pt has Type I DM, and has had cough and fever vahe dn off x 3 weeks.  The pt was seen in urgent care and was given Levaquin.  The Sx initially improved but then resumed and pt had 2nd visit to the urgent care and was told to go to the ER for IV Abx and the working dx of PNA.  In the ER the pt had temp of 102.1, WBC of 19 and HR of 137, thus, Sepsis on admission. The pt was cultured and placed on IV Ceftriaxone and azithromycin. The initial CXR showed retrocardiac linear opacity.   The pt is being evaluated by ID and Pul.  The PMHx includes Type I DM, + insulin pump, trigger finger.    INTERVAL HPI/OVERNIGHT EVENTS: T max 100.9, WBC 19 to 8.3,  cont IV Levaquin and Cefepime, ff by Pul and ID, elevated procalcitonin and ESR and C-RP, awaiting fungitell    MEDICATIONS  (STANDING):  ALBUTerol    90 MICROgram(s) HFA Inhaler 1 Puff(s) Inhalation every 4 hours  albuterol/ipratropium for Nebulization 3 milliLiter(s) Nebulizer every 6 hours  atorvastatin 10 milliGRAM(s) Oral at bedtime  benzocaine 15 mG/menthol 3.6 mG (Sugar-Free) Lozenge 1 Lozenge Oral two times a day  chlorhexidine 4% Liquid 1 Application(s) Topical <User Schedule>  enoxaparin Injectable 40 milliGRAM(s) SubCutaneous daily    tiotropium 18 MICROgram(s) Capsule 1 Capsule(s) Inhalation daily  Levaquin 750mg q 24  Cefepime 2gms q 8      MEDICATIONS  (PRN):  acetaminophen   Tablet .. 650 milliGRAM(s) Oral every 6 hours PRN Temp greater or equal to 38C (100.4F), Mild Pain (1 - 3)  guaiFENesin   Syrup  (Sugar-Free) 100 milliGRAM(s) Oral every 6 hours PRN Cough      Allergies    No Known Allergies    Vital Signs Last 24 Hrs    T(F): Tmax 100.9  HR: 90  BP: 113/66  RR: 18  SpO2: 93%    PHYSICAL EXAM:      Constitutional:  pt is alert and oriented x 3, uncomfortable but NAD, + O2    Eyes:  non icteric    ENMT:  dry oral mucosa    Neck:  supple, no JVD, no bruits    Respiratory: shallow resp, scattered rhonchi, no wheezing    Cardiovascular:  S1S2, tachy, no murmur    Gastrointestinal:  soft and benign + insulin pump    Genitourinary:  no garzon    Extremities:  moves all ext    Vascular:  + pedal pulses    Neurological:  nonfocal    Skin:  nor rash, in warmth    Lymph Nodes:  not enlarged    Musculoskeletal: normal mm mass and tone    LABS:                        12.4  8.3 )-----------( 256     WBC on ad:  19, 13.9,              36    137  |  98  |  12  ----------------------------< 230  3.9   |  24  |  0.8      Ca    8.5      09 Dec 2019 06:40  Mg     1.8     12-08  La 1.8, 1.3  TPro  5.7<L>  /  Alb  3.3<L>  /  TBili  0.8  /  DBili  x   /  AST  15  /  ALT  19  /  AlkPhos  59  12-08  procalcitonin 1.76  ( 0-0.6)  ESR 63  C-RP 20.6     blood C&S x2 neg  Urine neg  strept pneumoniae AG neg  Legionella  u Ag neg  MRSA of nares neg  RVP neg  C ANCA, P ANCA, atypical ANCA all neg      urine + ketones    RADIOLOGY & ADDITIONAL TESTS:    EKG:  sinus tach, no acute changes  CXR:  retrocardiac linear opacity, R lung clear  CT of lungs:  multi lobar patchy consolidations c/w PNA of the R upper, R middle lobes and BL lower lobes, trace pl fluid, mno pericardial fluid, 1.1 cm subcarinal LN,  subcm paratrach LN

## 2019-12-11 NOTE — PROGRESS NOTE ADULT - SUBJECTIVE AND OBJECTIVE BOX
OVERNIGHT EVENTS: events noted, slightly better, low grade fever    Vital Signs Last 24 Hrs  T(C): 37.3 (11 Dec 2019 05:17), Max: 38.9 (10 Dec 2019 13:54)  T(F): 99.2 (11 Dec 2019 05:17), Max: 102 (10 Dec 2019 13:54)  HR: 107 (11 Dec 2019 05:17) (95 - 110)  BP: 130/70 (11 Dec 2019 05:17) (117/63 - 132/64)  RR: 19 (11 Dec 2019 05:17) (18 - 19)  SpO2: 95% (11 Dec 2019 05:29) (90% - 95%)    PHYSICAL EXAMINATION:    GENERAL: The patient is awake and alert in no apparent distress.     HEENT: Head is normocephalic and atraumatic. Extraocular muscles are intact. Mucous membranes are moist.    NECK: Supple.    LUNGS: b/l crackles    HEART: Regular rate and rhythm without murmur.    ABDOMEN: Soft, nontender, and nondistended.      EXTREMITIES: Without any cyanosis, clubbing, rash, lesions or edema.    NEUROLOGIC: Grossly intact.    SKIN: No ulceration or induration present.      LABS:                        11.8   6.92  )-----------( 235      ( 10 Dec 2019 07:48 )             33.6     12-10    138  |  98  |  9<L>  ----------------------------<  225<H>  4.0   |  26  |  0.9    Ca    8.4<L>      10 Dec 2019 07:48        Urinalysis Basic - ( 10 Dec 2019 03:00 )    Color: Light Yellow / Appearance: Clear / S.017 / pH: x  Gluc: x / Ketone: Small  / Bili: Negative / Urobili: <2 mg/dL   Blood: x / Protein: Negative / Nitrite: Negative   Leuk Esterase: Negative / RBC: x / WBC x   Sq Epi: x / Non Sq Epi: x / Bacteria: x                  Procalcitonin, Serum: 1.76 ng/mL (12-10-19 @ 11:42)          MICROBIOLOGY:  Culture Results:   Normal Respiratory Smitha present (12-10 @ 03:00)  Culture Results:   No growth ( @ 16:40)  Culture Results:   No growth to date. ( @ 16:21)  Culture Results:   No growth to date. ( @ 16:21)  Culture Results:   Normal Respiratory Smitha present ( @ 16:15)  Culture Results:   No growth to date. ( @ 10:52)  Culture Results:   No growth to date. ( @ 10:52)      MEDICATIONS  (STANDING):  ALBUTerol    90 MICROgram(s) HFA Inhaler 1 Puff(s) Inhalation every 4 hours  albuterol/ipratropium for Nebulization 3 milliLiter(s) Nebulizer every 6 hours  atorvastatin 10 milliGRAM(s) Oral at bedtime  benzocaine 15 mG/menthol 3.6 mG (Sugar-Free) Lozenge 1 Lozenge Oral two times a day  cefepime   IVPB 2000 milliGRAM(s) IV Intermittent every 8 hours  chlorhexidine 4% Liquid 1 Application(s) Topical <User Schedule>  enoxaparin Injectable 40 milliGRAM(s) SubCutaneous daily  levoFLOXacin IVPB 750 milliGRAM(s) IV Intermittent every 24 hours  levoFLOXacin IVPB      tiotropium 18 MICROgram(s) Capsule 1 Capsule(s) Inhalation daily    MEDICATIONS  (PRN):  acetaminophen   Tablet .. 650 milliGRAM(s) Oral every 6 hours PRN Temp greater or equal to 38C (100.4F)  guaiFENesin   Syrup  (Sugar-Free) 100 milliGRAM(s) Oral every 6 hours PRN Cough      RADIOLOGY & ADDITIONAL STUDIES:

## 2019-12-11 NOTE — PROGRESS NOTE ADULT - ASSESSMENT
40 y/o m w/ pmhx of type 1 dm, hld presents with ~ 3 weeks of productive cough and fever. Pt started to get productive cough with clear to yellow sputum, associated with intermittent fever about 3 weeks ago and went to urgent care on 11/23 where he was given Levaquin and steroids. Pt condition improved after that for a few days but then he started to get similar symptoms again so he went to urgent care again today and he was told that he has pneumonia and he should come to ER.    # Fever with productive cough, likely sepsis on admission  - Still tachycardic  - Leukocytosis resolved (19.22 on admission)  - CT Chest shows multiple lobar patchy areas consistent with pneumonia  - CXR shows linear retrocardiac opacity, likely atelectasis  - ESR (63), Pro-calcitonin (1.76) and CRP (20.6) elevated  - UA negative for infection  - Glucosuria and ketones on UA on admission  - RVP negative, MRSA PCR negative, Flu negative, HIV negative, Blood cultures negative x4, Urine culture negative  - ID on case, c/w Cefepime and Levoquin  - Pulm following  - Urine Strep / Legionella negative  - F/U Aspergillus, Fungitell, Mycoplasma IgM, ANCA  - Sputum shows several rare bacterial species  - Consider heme/onc c/s to work-up weight loss    # Sinus tachycardia likely from fever  - TSH normal    # DM Type 1  - Patient has insulin pump and wants to continue using it  - Monitor finger stick AC+ HS  - Glucosuria on UA on admission    # DLD  - c/w statins    DVT PPx:  Lovenox 40 mg s/c  GI PPX:  not indicated  Diet: DASH/Carb consistent  Activity: Increase as tolerated  Dispo: from home, no needs  Code Status: full code

## 2019-12-11 NOTE — PROGRESS NOTE ADULT - ASSESSMENT
Impression    - Community acquired pneumonia ( risk factors DM/ recent abx usage) low grade T, high procalcitonin    Recommendations    Keep sats > 92%   Duonebs prn  ABX 	  f/up repeat procalcitonin level and fungitell  F/UP sputum culture  CXR  DVT px   BS control  OOB to chair  manda lawson

## 2019-12-11 NOTE — PROGRESS NOTE ADULT - SUBJECTIVE AND OBJECTIVE BOX
HPI  38 y/o M w/ PMHx of type 1 DM, HLD presents with ~ 3 weeks of productive cough and fever. Pt started to get productive cough with clear to yellow sputum, associated with intermittent fever about 3 weeks ago and went to urgent care on  where he was given Levaquin and steroids. Pt condition improved after that for a few days but then he started to get similar symptoms again so he went to urgent care again today and he was told that he has pneumonia and he should come to ER. Pt states that he has productive cough associated with generalized body aches and fever. The rest of his family members also have cough but his symptoms are worse. Pt has lost about 7 pounds in the last few weeks. Pt denied any hx of fever, chills, nausea, vomiting, diarrhea, constipation, melena, pain in abdomen, sob, chest pain, increased urinary frequency, dysuria, headache, lightheadedness, dizziness, vertigo, localized weakness or numbness/tingling.    In the ER pt was found to have fever with T max of 102 F, HR of 137, /68 mmHg, RR : 19, SPO2 94% on RA.     Currently admitted to medicine with the primary diagnosis of Sepsis     Today is hospital day 4d.     INTERVAL HPI / OVERNIGHT EVENTS:  Patient was examined and seen at bedside. This morning he is resting comfortably in bed and reports no new issues or overnight events.     ROS: Otherwise unremarkable     PAST MEDICAL & SURGICAL HISTORY  Diabetes, type I  History of trigger finger    ALLERGIES  No Known Allergies    MEDICATIONS  STANDING MEDICATIONS  ALBUTerol    90 MICROgram(s) HFA Inhaler 1 Puff(s) Inhalation every 4 hours  albuterol/ipratropium for Nebulization 3 milliLiter(s) Nebulizer every 6 hours  atorvastatin 10 milliGRAM(s) Oral at bedtime  benzocaine 15 mG/menthol 3.6 mG (Sugar-Free) Lozenge 1 Lozenge Oral two times a day  cefepime   IVPB 2000 milliGRAM(s) IV Intermittent every 8 hours  chlorhexidine 4% Liquid 1 Application(s) Topical <User Schedule>  enoxaparin Injectable 40 milliGRAM(s) SubCutaneous daily  levoFLOXacin IVPB 750 milliGRAM(s) IV Intermittent every 24 hours  levoFLOXacin IVPB      tiotropium 18 MICROgram(s) Capsule 1 Capsule(s) Inhalation daily    PRN MEDICATIONS  acetaminophen   Tablet .. 650 milliGRAM(s) Oral every 6 hours PRN  guaiFENesin   Syrup  (Sugar-Free) 100 milliGRAM(s) Oral every 6 hours PRN    VITALS:  T(F): 99.2  HR: 107  BP: 130/70  RR: 19  SpO2: 95%    PHYSICAL EXAM  GEN: NAD, Resting comfortably in bed  PULM: Clear to auscultation bilaterally, No wheezes  CVS: Regular rate and rhythm, S1-S2, no murmurs  ABD: Soft, non-tender, non-distended, no guarding  EXT: No edema  NEURO: AAOx3, no focal deficits    LABS                        11.8   6.92  )-----------( 235      ( 10 Dec 2019 07:48 )             33.6     12-10    138  |  98  |  9<L>  ----------------------------<  225<H>  4.0   |  26  |  0.9    Ca    8.4<L>      10 Dec 2019 07:48        Urinalysis Basic - ( 10 Dec 2019 03:00 )    Color: Light Yellow / Appearance: Clear / S.017 / pH: x  Gluc: x / Ketone: Small  / Bili: Negative / Urobili: <2 mg/dL   Blood: x / Protein: Negative / Nitrite: Negative   Leuk Esterase: Negative / RBC: x / WBC x   Sq Epi: x / Non Sq Epi: x / Bacteria: x        Sedimentation Rate, Erythrocyte: 63 mm/Hr <H> (12-10-19 @ 11:42)      Culture - Sputum (collected 10 Dec 2019 03:00)  Source: .Sputum Sputum  Gram Stain (10 Dec 2019 16:18):    Rare polymorphonuclear leukocytes per low power field    Rare Squamous epithelial cells per low power field    Few Gram Negative Rods per oil power field    Rare Gram Negative Diplococci per oil power field    Rare Gram Positive Cocci in Clusters per oil power field    RADIOLOGY    < from: CT Chest No Cont (19 @ 19:28) >  IMPRESSION:      Multi lobar patchy areas of nodular consolidation, consistent with   pneumonia (involving right upper lobe, right middle lobe, both lower   lobes).    Trace bilateral pleural effusions, slightly larger on the right.     Likely reactive 1.1 cm right subcarinal lymph node.      Extensive coronary artery calcifications, consistent with coronary artery   disease.    < end of copied text >    < from: Xray Chest 2 Views PA/Lat (19 @ 11:34) >  Impression:    Support devices: None.    Cardiac/mediastinum/hilum: Unremarkable.    Lung parenchyma/Pleura: Retrocardiac linear opacity, likely atelectasis.   Right lung is unremarkable. No evidence of pneumothorax.    Skeleton/soft tissues: Stable    < end of copied text > HPI  38 y/o M w/ PMHx of type 1 DM, HLD presents with ~ 3 weeks of productive cough and fever. Pt started to get productive cough with clear to yellow sputum, associated with intermittent fever about 3 weeks ago and went to urgent care on  where he was given Levaquin and steroids. Pt condition improved after that for a few days but then he started to get similar symptoms again so he went to urgent care again today and he was told that he has pneumonia and he should come to ER. Pt states that he has productive cough associated with generalized body aches and fever. The rest of his family members also have cough but his symptoms are worse. Pt has lost about 7 pounds in the last few weeks. Pt denied any hx of fever, chills, nausea, vomiting, diarrhea, constipation, melena, pain in abdomen, sob, chest pain, increased urinary frequency, dysuria, headache, lightheadedness, dizziness, vertigo, localized weakness or numbness/tingling.    In the ER pt was found to have fever with T max of 102 F, HR of 137, /68 mmHg, RR : 19, SPO2 94% on RA.     Currently admitted to medicine with the primary diagnosis of Sepsis     Today is hospital day 4d.     INTERVAL HPI / OVERNIGHT EVENTS:  Patient was examined and seen at bedside. This morning he is resting comfortably in bed and reports no new issues or overnight events. Patient was on NC, conversational, denied CP at rest, has pleuritic CP exacerbated by cough, SOB, abdominal pain. Ambulates well. No changes in BM or urinary habits. Concerned about his fevers.      ROS: Otherwise unremarkable     PAST MEDICAL & SURGICAL HISTORY  Diabetes, type I  History of trigger finger    ALLERGIES  No Known Allergies    MEDICATIONS  STANDING MEDICATIONS  ALBUTerol    90 MICROgram(s) HFA Inhaler 1 Puff(s) Inhalation every 4 hours  albuterol/ipratropium for Nebulization 3 milliLiter(s) Nebulizer every 6 hours  atorvastatin 10 milliGRAM(s) Oral at bedtime  benzocaine 15 mG/menthol 3.6 mG (Sugar-Free) Lozenge 1 Lozenge Oral two times a day  cefepime   IVPB 2000 milliGRAM(s) IV Intermittent every 8 hours  chlorhexidine 4% Liquid 1 Application(s) Topical <User Schedule>  enoxaparin Injectable 40 milliGRAM(s) SubCutaneous daily  levoFLOXacin IVPB 750 milliGRAM(s) IV Intermittent every 24 hours  levoFLOXacin IVPB      tiotropium 18 MICROgram(s) Capsule 1 Capsule(s) Inhalation daily    PRN MEDICATIONS  acetaminophen   Tablet .. 650 milliGRAM(s) Oral every 6 hours PRN  guaiFENesin   Syrup  (Sugar-Free) 100 milliGRAM(s) Oral every 6 hours PRN    VITALS:  T(F): 99.2  HR: 107  BP: 130/70  RR: 19  SpO2: 95%    PHYSICAL EXAM  GEN: NAD, Resting comfortably in bed  PULM: Clear to auscultation bilaterally, No wheezes  CVS: Fast rate and regular rhythm, S1-S2, no murmurs  ABD: Somewhat firm, non-tender, non-distended, no guarding  EXT: No edema  NEURO: AAOx3, no focal deficits    LABS                        11.8   6.92  )-----------( 235      ( 10 Dec 2019 07:48 )             33.6     12-10    138  |  98  |  9<L>  ----------------------------<  225<H>  4.0   |  26  |  0.9    Ca    8.4<L>      10 Dec 2019 07:48        Urinalysis Basic - ( 10 Dec 2019 03:00 )    Color: Light Yellow / Appearance: Clear / S.017 / pH: x  Gluc: x / Ketone: Small  / Bili: Negative / Urobili: <2 mg/dL   Blood: x / Protein: Negative / Nitrite: Negative   Leuk Esterase: Negative / RBC: x / WBC x   Sq Epi: x / Non Sq Epi: x / Bacteria: x        Sedimentation Rate, Erythrocyte: 63 mm/Hr <H> (12-10-19 @ 11:42)      Culture - Sputum (collected 10 Dec 2019 03:00)  Source: .Sputum Sputum  Gram Stain (10 Dec 2019 16:18):    Rare polymorphonuclear leukocytes per low power field    Rare Squamous epithelial cells per low power field    Few Gram Negative Rods per oil power field    Rare Gram Negative Diplococci per oil power field    Rare Gram Positive Cocci in Clusters per oil power field    RADIOLOGY    < from: CT Chest No Cont (19 @ 19:28) >  IMPRESSION:      Multi lobar patchy areas of nodular consolidation, consistent with   pneumonia (involving right upper lobe, right middle lobe, both lower   lobes).    Trace bilateral pleural effusions, slightly larger on the right.     Likely reactive 1.1 cm right subcarinal lymph node.      Extensive coronary artery calcifications, consistent with coronary artery   disease.    < end of copied text >    < from: Xray Chest 2 Views PA/Lat (19 @ 11:34) >  Impression:    Support devices: None.    Cardiac/mediastinum/hilum: Unremarkable.    Lung parenchyma/Pleura: Retrocardiac linear opacity, likely atelectasis.   Right lung is unremarkable. No evidence of pneumothorax.    Skeleton/soft tissues: Stable    < end of copied text >

## 2019-12-11 NOTE — PROGRESS NOTE ADULT - ASSESSMENT
Sepsis ( with WBC of 19, temp 102.1, tachy 137/min,)  PNA  Hx of Type I DM   Hx of DLD  Hx of trigger finger    CXR reviewed + retrocardiac linear opacity  CT of the chest noncontrast: BL multilobe PNA  cont IV ABx:  Cefepime 2 gms q 8, Levaquin 750mg  q 24  nebulizer tx PRN  cont O2, check pulse ox daily  Pulmonary consult and ff up appreciated  ID consult and ff up appreciated  WBC trending down 19 to 8.3, T max 100.9, , mild clinical improvement  encourage oral fluids

## 2019-12-12 LAB
ALBUMIN SERPL ELPH-MCNC: 3.2 G/DL — LOW (ref 3.5–5.2)
ALP SERPL-CCNC: 60 U/L — SIGNIFICANT CHANGE UP (ref 30–115)
ALT FLD-CCNC: 30 U/L — SIGNIFICANT CHANGE UP (ref 0–41)
ANION GAP SERPL CALC-SCNC: 17 MMOL/L — HIGH (ref 7–14)
AST SERPL-CCNC: 18 U/L — SIGNIFICANT CHANGE UP (ref 0–41)
BILIRUB SERPL-MCNC: 0.4 MG/DL — SIGNIFICANT CHANGE UP (ref 0.2–1.2)
BUN SERPL-MCNC: 12 MG/DL — SIGNIFICANT CHANGE UP (ref 10–20)
CALCIUM SERPL-MCNC: 9.1 MG/DL — SIGNIFICANT CHANGE UP (ref 8.5–10.1)
CHLORIDE SERPL-SCNC: 100 MMOL/L — SIGNIFICANT CHANGE UP (ref 98–110)
CO2 SERPL-SCNC: 23 MMOL/L — SIGNIFICANT CHANGE UP (ref 17–32)
CREAT SERPL-MCNC: 0.8 MG/DL — SIGNIFICANT CHANGE UP (ref 0.7–1.5)
CULTURE RESULTS: SIGNIFICANT CHANGE UP
GLUCOSE BLDC GLUCOMTR-MCNC: 158 MG/DL — HIGH (ref 70–99)
GLUCOSE BLDC GLUCOMTR-MCNC: 179 MG/DL — HIGH (ref 70–99)
GLUCOSE BLDC GLUCOMTR-MCNC: 209 MG/DL — HIGH (ref 70–99)
GLUCOSE BLDC GLUCOMTR-MCNC: 214 MG/DL — HIGH (ref 70–99)
GLUCOSE SERPL-MCNC: 145 MG/DL — HIGH (ref 70–99)
HCT VFR BLD CALC: 36.2 % — LOW (ref 42–52)
HGB BLD-MCNC: 12.6 G/DL — LOW (ref 14–18)
M PNEUMO IGM SER-ACNC: 46 UNITS/ML — SIGNIFICANT CHANGE UP
MAGNESIUM SERPL-MCNC: 1.9 MG/DL — SIGNIFICANT CHANGE UP (ref 1.8–2.4)
MCHC RBC-ENTMCNC: 29 PG — SIGNIFICANT CHANGE UP (ref 27–31)
MCHC RBC-ENTMCNC: 34.8 G/DL — SIGNIFICANT CHANGE UP (ref 32–37)
MCV RBC AUTO: 83.2 FL — SIGNIFICANT CHANGE UP (ref 80–94)
MYCOPLASMA AG SPEC QL: NEGATIVE — SIGNIFICANT CHANGE UP
NRBC # BLD: 0 /100 WBCS — SIGNIFICANT CHANGE UP (ref 0–0)
PLATELET # BLD AUTO: 283 K/UL — SIGNIFICANT CHANGE UP (ref 130–400)
POTASSIUM SERPL-MCNC: 4.1 MMOL/L — SIGNIFICANT CHANGE UP (ref 3.5–5)
POTASSIUM SERPL-SCNC: 4.1 MMOL/L — SIGNIFICANT CHANGE UP (ref 3.5–5)
PROT SERPL-MCNC: 5.9 G/DL — LOW (ref 6–8)
RBC # BLD: 4.35 M/UL — LOW (ref 4.7–6.1)
RBC # FLD: 12.4 % — SIGNIFICANT CHANGE UP (ref 11.5–14.5)
SODIUM SERPL-SCNC: 140 MMOL/L — SIGNIFICANT CHANGE UP (ref 135–146)
SPECIMEN SOURCE: SIGNIFICANT CHANGE UP
WBC # BLD: 8.89 K/UL — SIGNIFICANT CHANGE UP (ref 4.8–10.8)
WBC # FLD AUTO: 8.89 K/UL — SIGNIFICANT CHANGE UP (ref 4.8–10.8)

## 2019-12-12 PROCEDURE — 71045 X-RAY EXAM CHEST 1 VIEW: CPT | Mod: 26

## 2019-12-12 PROCEDURE — 93306 TTE W/DOPPLER COMPLETE: CPT | Mod: 26

## 2019-12-12 RX ADMIN — CEFEPIME 100 MILLIGRAM(S): 1 INJECTION, POWDER, FOR SOLUTION INTRAMUSCULAR; INTRAVENOUS at 22:21

## 2019-12-12 RX ADMIN — Medication 100 MILLIGRAM(S): at 22:23

## 2019-12-12 RX ADMIN — Medication 3 MILLILITER(S): at 09:51

## 2019-12-12 RX ADMIN — BENZOCAINE AND MENTHOL 1 LOZENGE: 5; 1 LIQUID ORAL at 06:11

## 2019-12-12 RX ADMIN — BENZOCAINE AND MENTHOL 1 LOZENGE: 5; 1 LIQUID ORAL at 16:51

## 2019-12-12 RX ADMIN — ATORVASTATIN CALCIUM 10 MILLIGRAM(S): 80 TABLET, FILM COATED ORAL at 06:11

## 2019-12-12 RX ADMIN — ENOXAPARIN SODIUM 40 MILLIGRAM(S): 100 INJECTION SUBCUTANEOUS at 12:10

## 2019-12-12 RX ADMIN — Medication 3 MILLILITER(S): at 14:36

## 2019-12-12 RX ADMIN — CEFEPIME 100 MILLIGRAM(S): 1 INJECTION, POWDER, FOR SOLUTION INTRAMUSCULAR; INTRAVENOUS at 14:16

## 2019-12-12 RX ADMIN — Medication 3 MILLILITER(S): at 20:28

## 2019-12-12 RX ADMIN — CEFEPIME 100 MILLIGRAM(S): 1 INJECTION, POWDER, FOR SOLUTION INTRAMUSCULAR; INTRAVENOUS at 06:11

## 2019-12-12 NOTE — PROGRESS NOTE ADULT - SUBJECTIVE AND OBJECTIVE BOX
LIZ ALVARADO 40yo  Male came to the ER for c/o cont cough with fever of 102.1, productive cough and leukocytosis of 19.  The pt has Type I DM, and has had cough and fever vahe dn off x 3 weeks.  The pt was seen in urgent care and was given Levaquin.  The Sx initially improved but then resumed and pt had 2nd visit to the urgent care and was told to go to the ER for IV Abx and the working dx of PNA.  In the ER the pt had temp of 102.1, WBC of 19 and HR of 137, thus, Sepsis on admission. The pt was cultured and placed on IV Ceftriaxone and azithromycin. The initial CXR showed retrocardiac linear opacity.   The pt is being evaluated by ID and Pul.  The PMHx includes Type I DM, + insulin pump, trigger finger.    INTERVAL HPI/OVERNIGHT EVENTS: afebrile last 24 hrs, WBC 19 to 8.8,  cont IV Levaquin and Cefepime, ff by Pul and ID, elevated procalcitonin and ESR and C-RP, awaiting fungitell, cont to req O2    MEDICATIONS  (STANDING):  ALBUTerol    90 MICROgram(s) HFA Inhaler 1 Puff(s) Inhalation every 4 hours  albuterol/ipratropium for Nebulization 3 milliLiter(s) Nebulizer every 6 hours  atorvastatin 10 milliGRAM(s) Oral at bedtime  benzocaine 15 mG/menthol 3.6 mG (Sugar-Free) Lozenge 1 Lozenge Oral two times a day  chlorhexidine 4% Liquid 1 Application(s) Topical <User Schedule>  enoxaparin Injectable 40 milliGRAM(s) SubCutaneous daily    tiotropium 18 MICROgram(s) Capsule 1 Capsule(s) Inhalation daily  Levaquin 750mg q 24  Cefepime 2gms q 8      MEDICATIONS  (PRN):  acetaminophen   Tablet .. 650 milliGRAM(s) Oral every 6 hours PRN Temp greater or equal to 38C (100.4F), Mild Pain (1 - 3)  guaiFENesin   Syrup  (Sugar-Free) 100 milliGRAM(s) Oral every 6 hours PRN Cough      Allergies    No Known Allergies    Vital Signs Last 24 Hrs    T(F): 98,  HR: 89  BP: 129/60  RR: 18  SpO2: 94%    PHYSICAL EXAM:      Constitutional:  pt is alert and oriented x 3, uncomfortable but NAD, + O2    Eyes:  non icteric    ENMT:  dry oral mucosa    Neck:  supple, no JVD, no bruits    Respiratory: shallow resp, scattered rhonchi, no wheezing    Cardiovascular:  S1S2, tachy, no murmur    Gastrointestinal:  soft and benign + insulin pump    Genitourinary:  no garzon    Extremities:  moves all ext    Vascular:  + pedal pulses    Neurological:  nonfocal    Skin:  nor rash, in warmth    Lymph Nodes:  not enlarged    Musculoskeletal: normal mm mass and tone    LABS:                        12  8.8 )-----------( 283     WBC on ad:  19, 13.9,              36    140  |  100  |  12  ----------------------------< 145  4.1   |  33  |  0.8      Ca    8.5       Mg     1.9  La 1.8, 1.3  TPro  5.9  /  Alb  3.2  /  TBili  0.8  /  DBili  x   /  AST  15  /  ALT  19  /  AlkPhos  59  12-08  procalcitonin 1.76  ( 0-0.6)  ESR 63  C-RP 20.6     blood C&S x2 neg  Urine neg  strept pneumoniae AG neg  Legionella  u Ag neg  MRSA of nares neg  RVP neg  C ANCA, P ANCA, atypical ANCA all neg      urine + ketones    RADIOLOGY & ADDITIONAL TESTS:    EKG:  sinus tach, no acute changes  CXR:  retrocardiac linear opacity, R lung clear  CT of lungs:  multi lobar patchy consolidations c/w PNA of the R upper, R middle lobes and BL lower lobes, trace pl fluid, mno pericardial fluid, 1.1 cm subcarinal LN,  subcm paratrach LN

## 2019-12-12 NOTE — PROGRESS NOTE ADULT - ASSESSMENT
Impression    - Community acquired pneumonia ( risk factors DM/ recent abx usage) Fever trend improving, high procalcitonin feels better    Recommendations    Keep sats > 92%   Duonebs prn  ABX ( CEFEPIME)  f/up repeat procalcitonin level   Repeat CXR TODAY  POX RA  DVT px   BS control  OOB to chair  AMBULATE  will renny

## 2019-12-12 NOTE — PROGRESS NOTE ADULT - ASSESSMENT
40 y/o m w/ pmhx of type 1 dm, hld presents with ~ 3 weeks of productive cough and fever. Pt started to get productive cough with clear to yellow sputum, associated with intermittent fever about 3 weeks ago and went to urgent care on 11/23 where he was given Levaquin and steroids. Pt condition improved after that for a few days but then he started to get similar symptoms again so he went to urgent care again today and he was told that he has pneumonia and he should come to ER.    # Fever with productive cough, likely sepsis on admission  - Still tachycardic  - Leukocytosis resolved (19.22 on admission)  - CT Chest shows multiple lobar patchy areas consistent with pneumonia  - CXR shows linear retrocardiac opacity, likely atelectasis  - ESR (63), Pro-calcitonin (1.76) and CRP (20.6) elevated  - UA negative for infection  - Glucosuria and ketones on UA on admission  - RVP negative, MRSA PCR negative, Flu negative, HIV negative, Blood cultures negative x4, Urine culture negative  - ID on case, c/w Cefepime and Levoquin  - Pulm following  - Urine Strep / Legionella negative  - F/U Aspergillus, Fungitell  - Mycoplasma IgM, ANCA WNL  - Sputum shows several rare bacterial species  - Consider heme/onc c/s to work-up weight loss  - F/U PPD    # Sinus tachycardia likely from fever  - TSH normal    # DM Type 1  - Patient has insulin pump and wants to continue using it  - Monitor finger stick AC+ HS  - Glucosuria on UA on admission    # DLD  - c/w statins    DVT PPx:  Lovenox 40 mg s/c  GI PPX:  not indicated  Diet: DASH/Carb consistent  Activity: Increase as tolerated  Dispo: from home, no needs  Code Status: full code 38 y/o m w/ pmhx of type 1 dm, hld presents with ~ 3 weeks of productive cough and fever. Pt started to get productive cough with clear to yellow sputum, associated with intermittent fever about 3 weeks ago and went to urgent care on 11/23 where he was given Levaquin and steroids. Pt condition improved after that for a few days but then he started to get similar symptoms again so he went to urgent care again today and he was told that he has pneumonia and he should come to ER.    # Fever with productive cough, likely sepsis on admission  - Still tachycardic  - Leukocytosis resolved (19.22 on admission)  - CT Chest shows multiple lobar patchy areas consistent with pneumonia  - CXR shows linear retrocardiac opacity, likely atelectasis  - ESR (63), Pro-calcitonin (1.76) and CRP (20.6) elevated  - UA negative for infection  - Glucosuria and ketones on UA on admission  - RVP negative, MRSA PCR negative, Flu negative, HIV negative, Blood cultures negative x4, Urine culture negative  - ID on case, c/w Cefepime and Levoquin  - Pulm following  - Urine Strep / Legionella negative  - F/U Aspergillus, Fungitell  - Mycoplasma IgM, ANCA WNL  - Sputum shows several rare bacterial species  - Consider heme/onc c/s to work-up weight loss  - F/U PPD  - May need bronchoscopy?    # Sinus tachycardia likely from fever  - TSH normal    # DM Type 1  - Patient has insulin pump and wants to continue using it  - Monitor finger stick AC+ HS  - Glucosuria on UA on admission    # DLD  - c/w statins    DVT PPx:  Lovenox 40 mg s/c  GI PPX:  not indicated  Diet: DASH/Carb consistent  Activity: Increase as tolerated  Dispo: from home, no needs  Code Status: full code

## 2019-12-12 NOTE — PROGRESS NOTE ADULT - ASSESSMENT
Sepsis ( with WBC of 19, temp 102.1, tachy 137/min,)  PNA  Hx of Type I DM   Hx of DLD  Hx of trigger finger    CXR reviewed + retrocardiac linear opacity  CT of the chest noncontrast: BL multilobe PNA  cont IV ABx:  Cefepime 2 gms q 8, Levaquin 750mg  q 24  nebulizer tx PRN  cont O2, check pulse ox daily  Pulmonary consult and ff up appreciated, ? will pt req bronchoscopy  awaiting fungitell and aspergillus galactomannon  ID consult and ff up appreciated  WBC trending down 19 to 8.8, afebrile last 24 hrs , mild clinical improvement  encourage oral fluids

## 2019-12-12 NOTE — PROGRESS NOTE ADULT - SUBJECTIVE AND OBJECTIVE BOX
HPI  Patient is a 39y old Male who presents with a chief complaint of fever with productive cough/Sepsis/PNA (11 Dec 2019 23:04)    Currently admitted to medicine with the primary diagnosis of Sepsis     Today is hospital day 5d.     INTERVAL HPI / OVERNIGHT EVENTS:  Patient was examined and seen at bedside. This morning he is resting comfortably in bed and reports no new issues or overnight events. Patient was on NC, conversational, denied CP at rest, has pleuritic CP exacerbated by cough, SOB, abdominal pain. Ambulates well. No changes in BM or urinary habits. Concerned about his fevers.      ROS: Otherwise unremarkable     PAST MEDICAL & SURGICAL HISTORY  Diabetes, type I  History of trigger finger    ALLERGIES  No Known Allergies    MEDICATIONS  STANDING MEDICATIONS  ALBUTerol    90 MICROgram(s) HFA Inhaler 1 Puff(s) Inhalation every 4 hours  albuterol/ipratropium for Nebulization 3 milliLiter(s) Nebulizer every 6 hours  atorvastatin 10 milliGRAM(s) Oral at bedtime  benzocaine 15 mG/menthol 3.6 mG (Sugar-Free) Lozenge 1 Lozenge Oral two times a day  cefepime   IVPB 2000 milliGRAM(s) IV Intermittent every 8 hours  chlorhexidine 4% Liquid 1 Application(s) Topical <User Schedule>  enoxaparin Injectable 40 milliGRAM(s) SubCutaneous daily  levoFLOXacin IVPB 750 milliGRAM(s) IV Intermittent every 24 hours  levoFLOXacin IVPB      tiotropium 18 MICROgram(s) Capsule 1 Capsule(s) Inhalation daily    PRN MEDICATIONS  acetaminophen   Tablet .. 650 milliGRAM(s) Oral every 6 hours PRN  guaiFENesin   Syrup  (Sugar-Free) 100 milliGRAM(s) Oral every 6 hours PRN    VITALS:  T(F): 98.1  HR: 89  BP: 129/60  RR: 18  SpO2: 94%    PHYSICAL EXAM  GEN: NAD, Resting comfortably in bed  PULM: Clear to auscultation bilaterally, No wheezes  CVS: Fast rate and regular rhythm, S1-S2, no murmurs  ABD: Somewhat firm, non-tender, non-distended, no guarding  EXT: No edema  NEURO: AAOx3, no focal deficits      LABS                        12.4   8.30  )-----------( 256      ( 11 Dec 2019 08:54 )             36.0     12-11    137  |  98  |  12  ----------------------------<  230<H>  3.9   |  24  |  0.8    Ca    8.9      11 Dec 2019 08:54    TPro  5.8<L>  /  Alb  3.3<L>  /  TBili  0.5  /  DBili  x   /  AST  18  /  ALT  33  /  AlkPhos  66  12-11              Culture - Sputum (collected 10 Dec 2019 03:00)  Source: .Sputum Sputum  Gram Stain (10 Dec 2019 16:18):    Rare polymorphonuclear leukocytes per low power field    Rare Squamous epithelial cells per low power field    Few Gram Negative Rods per oil power field    Rare Gram Negative Diplococci per oil power field    Rare Gram Positive Cocci in Clusters per oil power field  Final Report (12 Dec 2019 07:47):    Normal Respiratory Smitha present    Culture - Urine (collected 10 Dec 2019 03:00)  Source: .Urine Clean Catch (Midstream)  Final Report (11 Dec 2019 11:37):    No growth          RADIOLOGY  < from: CT Chest No Cont (12.09.19 @ 19:28) >  IMPRESSION:      Multi lobar patchy areas of nodular consolidation, consistent with   pneumonia (involving right upper lobe, right middle lobe, both lower   lobes).    Trace bilateral pleural effusions, slightly larger on the right.     Likely reactive 1.1 cm right subcarinal lymph node.      Extensive coronary artery calcifications, consistent with coronary artery   disease.    < end of copied text >    < from: Xray Chest 2 Views PA/Lat (12.07.19 @ 11:34) >  Impression:    Support devices: None.    Cardiac/mediastinum/hilum: Unremarkable.    Lung parenchyma/Pleura: Retrocardiac linear opacity, likely atelectasis.   Right lung is unremarkable. No evidence of pneumothorax.    Skeleton/soft tissues: Stable    < end of copied text > HPI  Patient is a 39y old Male who presents with a chief complaint of fever with productive cough/Sepsis/PNA (11 Dec 2019 23:04)    Currently admitted to medicine with the primary diagnosis of Sepsis     Today is hospital day 5d.     INTERVAL HPI / OVERNIGHT EVENTS:  Patient was examined and seen at bedside. This morning he is resting comfortably in bed and reports no new issues or overnight events. Patient was on NC, conversational, denied CP at rest. Still desaturates when taken off NC. Ambulates well. No changes in BM or urinary habits. Still coughing up whitish/yellowish sputum. May need bronchoscopy.  ROS: Otherwise unremarkable     PAST MEDICAL & SURGICAL HISTORY  Diabetes, type I  History of trigger finger    ALLERGIES  No Known Allergies    MEDICATIONS  STANDING MEDICATIONS  ALBUTerol    90 MICROgram(s) HFA Inhaler 1 Puff(s) Inhalation every 4 hours  albuterol/ipratropium for Nebulization 3 milliLiter(s) Nebulizer every 6 hours  atorvastatin 10 milliGRAM(s) Oral at bedtime  benzocaine 15 mG/menthol 3.6 mG (Sugar-Free) Lozenge 1 Lozenge Oral two times a day  cefepime   IVPB 2000 milliGRAM(s) IV Intermittent every 8 hours  chlorhexidine 4% Liquid 1 Application(s) Topical <User Schedule>  enoxaparin Injectable 40 milliGRAM(s) SubCutaneous daily  levoFLOXacin IVPB 750 milliGRAM(s) IV Intermittent every 24 hours  levoFLOXacin IVPB      tiotropium 18 MICROgram(s) Capsule 1 Capsule(s) Inhalation daily    PRN MEDICATIONS  acetaminophen   Tablet .. 650 milliGRAM(s) Oral every 6 hours PRN  guaiFENesin   Syrup  (Sugar-Free) 100 milliGRAM(s) Oral every 6 hours PRN    VITALS:  T(F): 98.1  HR: 89  BP: 129/60  RR: 18  SpO2: 94%    PHYSICAL EXAM  GEN: NAD, Resting comfortably in bed  PULM: Clear to auscultation bilaterally, No wheezes  CVS: Fast rate and regular rhythm, S1-S2, no murmurs  ABD: Somewhat firm, non-tender, non-distended, no guarding  EXT: No edema  NEURO: AAOx3, no focal deficits      LABS                        12.4   8.30  )-----------( 256      ( 11 Dec 2019 08:54 )             36.0     12-11    137  |  98  |  12  ----------------------------<  230<H>  3.9   |  24  |  0.8    Ca    8.9      11 Dec 2019 08:54    TPro  5.8<L>  /  Alb  3.3<L>  /  TBili  0.5  /  DBili  x   /  AST  18  /  ALT  33  /  AlkPhos  66  12-11              Culture - Sputum (collected 10 Dec 2019 03:00)  Source: .Sputum Sputum  Gram Stain (10 Dec 2019 16:18):    Rare polymorphonuclear leukocytes per low power field    Rare Squamous epithelial cells per low power field    Few Gram Negative Rods per oil power field    Rare Gram Negative Diplococci per oil power field    Rare Gram Positive Cocci in Clusters per oil power field  Final Report (12 Dec 2019 07:47):    Normal Respiratory Smitha present    Culture - Urine (collected 10 Dec 2019 03:00)  Source: .Urine Clean Catch (Midstream)  Final Report (11 Dec 2019 11:37):    No growth          RADIOLOGY  < from: CT Chest No Cont (12.09.19 @ 19:28) >  IMPRESSION:      Multi lobar patchy areas of nodular consolidation, consistent with   pneumonia (involving right upper lobe, right middle lobe, both lower   lobes).    Trace bilateral pleural effusions, slightly larger on the right.     Likely reactive 1.1 cm right subcarinal lymph node.      Extensive coronary artery calcifications, consistent with coronary artery   disease.    < end of copied text >    < from: Xray Chest 2 Views PA/Lat (12.07.19 @ 11:34) >  Impression:    Support devices: None.    Cardiac/mediastinum/hilum: Unremarkable.    Lung parenchyma/Pleura: Retrocardiac linear opacity, likely atelectasis.   Right lung is unremarkable. No evidence of pneumothorax.    Skeleton/soft tissues: Stable    < end of copied text >

## 2019-12-12 NOTE — PROGRESS NOTE ADULT - ASSESSMENT
39y Male with a PMH of DMI and HLD presents with a chief complaint of intermittent fever (Tmax 103) and productive cough (clear/yellow phlegm) for the past 3 weeks. Initially seen by urgent care on 11/23 and was on Levaquin and steroids with only temporary improvement in fever and worsening of cough. Pt diagnosed with pneumonia at urgent care on 12/7 and now presents to ED for further work-up    IMPRESSION  # Sepsis secondary to suspected atypical PNA, r/o GNR PNA r/o inflammatory    Procalcitonin, Serum: 1.76: (12.10.19 @ 11:42), RVP neg    CT Chest with multifocal opacities    Discussed with lab, admission sputum cx neg & repeat 12/10 neg, no PMNS (norovirus is an error)    ESR 63 CRP 20 ANCA neg    Blood & Urine cxs NGTD , Flu/RSV neg, legionella/strep neg, HIV neg    No real exposures, children at home sick, no vaping  #Sepsis on admission (T 102, , WBC 19.22) Tmax 102.1    still low grade fevers    RECOMMENDATIONS  - Pulm following ?bronch   - Cefepime 2g q8h IV  - D/C levaquin and monitor to see if rising fever curve   - f/u fungitell, aspergillus galactomannan  - Likely D/C with midline and IV cefepime 2g q12h IV     Spectra [phone]

## 2019-12-12 NOTE — PROGRESS NOTE ADULT - SUBJECTIVE AND OBJECTIVE BOX
LIZ ALVARADO  39y, Male  Allergy: No Known Allergies      CHIEF COMPLAINT: fever with productive cough (12 Dec 2019 08:20)      INTERVAL EVENTS/HPI  - No acute events overnight  - T(F): , Max: 100.9 (12-11-19 @ 16:30)  - Denies any worsening symptoms  - Tolerating medication  - WBC Count: 8.89 (12-12-19 @ 07:40)  WBC Count: 8.30 (12-11-19 @ 08:54)  - Creatinine, Serum: 0.8 (12-12-19 @ 07:40)  Creatinine, Serum: 0.8 (12-11-19 @ 08:54)       ROS  General: Denies rigors, nightsweats  HEENT: Denies headache, rhinorrhea, sore throat, eye pain  CV: Denies CP, palpitations  PULM: Denies wheezing, hemoptysis  GI: Denies hematemesis, hematochezia, melena  : Denies discharge, hematuria  MSK: Denies arthralgias, myalgias  SKIN: Denies rash, lesions  NEURO: Denies paresthesias, weakness  PSYCH: Denies depression, anxiety    VITALS:  T(F): 98.1, Max: 100.9 (12-11-19 @ 16:30)  HR: 89  BP: 129/60  RR: 18Vital Signs Last 24 Hrs  T(C): 36.7 (12 Dec 2019 05:56), Max: 38.3 (11 Dec 2019 16:30)  T(F): 98.1 (12 Dec 2019 05:56), Max: 100.9 (11 Dec 2019 16:30)  HR: 89 (12 Dec 2019 05:56) (89 - 113)  BP: 129/60 (12 Dec 2019 05:56) (113/66 - 129/72)  BP(mean): --  RR: 18 (12 Dec 2019 05:56) (16 - 18)  SpO2: 94% (12 Dec 2019 08:07) (94% - 95%)    PHYSICAL EXAM:  Gen: NAD, resting in bed  HEENT: Normocephalic, atraumatic  Neck: supple, no lymphadenopathy  CV: Coarse BS  Lungs: decreased BS at bases, no fremitus  Abdomen: Soft, BS present  Ext: Warm, well perfused  Neuro: non focal, awake  Skin: no rash, no erythema  Lines: no phlebitis    FH: Non-contributory  Social Hx: Non-contributory    TESTS & MEASUREMENTS:                        12.6   8.89  )-----------( 283      ( 12 Dec 2019 07:40 )             36.2     12-12    140  |  100  |  12  ----------------------------<  145<H>  4.1   |  23  |  0.8    Ca    9.1      12 Dec 2019 07:40  Mg     1.9     12-12    TPro  5.9<L>  /  Alb  3.2<L>  /  TBili  0.4  /  DBili  x   /  AST  18  /  ALT  30  /  AlkPhos  60  12-12    eGFR if Non African American: 113 mL/min/1.73M2 (12-12-19 @ 07:40)  eGFR if : 131 mL/min/1.73M2 (12-12-19 @ 07:40)    LIVER FUNCTIONS - ( 12 Dec 2019 07:40 )  Alb: 3.2 g/dL / Pro: 5.9 g/dL / ALK PHOS: 60 U/L / ALT: 30 U/L / AST: 18 U/L / GGT: x               Culture - Sputum (collected 12-10-19 @ 03:00)  Source: .Sputum Sputum  Gram Stain (12-10-19 @ 16:18):    Rare polymorphonuclear leukocytes per low power field    Rare Squamous epithelial cells per low power field    Few Gram Negative Rods per oil power field    Rare Gram Negative Diplococci per oil power field    Rare Gram Positive Cocci in Clusters per oil power field  Final Report (12-12-19 @ 07:47):    Normal Respiratory Smitha present    Culture - Urine (collected 12-10-19 @ 03:00)  Source: .Urine Clean Catch (Midstream)  Final Report (12-11-19 @ 11:37):    No growth    Culture - Urine (collected 12-07-19 @ 16:40)  Source: .Urine Clean Catch (Midstream)  Final Report (12-09-19 @ 07:22):    No growth    Culture - Blood (collected 12-07-19 @ 16:21)  Source: .Blood None  Preliminary Report (12-09-19 @ 01:02):    No growth to date.    Culture - Blood (collected 12-07-19 @ 16:21)  Source: .Blood None  Preliminary Report (12-09-19 @ 01:02):    No growth to date.    Culture - Sputum (collected 12-07-19 @ 16:15)  Source: .Sputum Sputum  Gram Stain (12-08-19 @ 06:49):    Norovirus GI/GII polymorphonuclear leukocytes per low power field    ***Add Quantity Code*** Squamous epithelial cells per low power field    Moderate Gram positive cocci in pairs seen per oil power field    Moderate Gram Negative Rods seen per oil powerfield  Final Report (12-09-19 @ 17:05):    Normal Respiratory Smitha present    Culture - Blood (collected 12-07-19 @ 10:52)  Source: .Blood Blood-Peripheral  Preliminary Report (12-08-19 @ 19:01):    No growth to date.    Culture - Blood (collected 12-07-19 @ 10:52)  Source: .Blood Blood-Peripheral  Preliminary Report (12-08-19 @ 19:01):    No growth to date.        Lactate, Blood: 1.3 mmol/L (12-07-19 @ 14:00)  Blood Gas Venous - Lactate: 1.8 mmoL/L (12-07-19 @ 11:16)  Lactate, Blood: 1.8 mmol/L (12-07-19 @ 10:52)      INFECTIOUS DISEASES TESTING  Rapid RVP Result: NotDetec (12-10-19 @ 08:12)  MRSA PCR Result.: Negative (12-10-19 @ 06:00)  Streptococcus Pneumoniae Ag Urine: Negative (12-07-19 @ 16:40)  Legionella Antigen, Urine: Negative (12-07-19 @ 16:40)  HIV-1/2 Combo Result: Nonreact (12-07-19 @ 16:21)      RADIOLOGY & ADDITIONAL TESTS:  I have personally reviewed the last Chest xray  CXR      CT  CT Chest No Cont:   EXAM:  CT CHEST            PROCEDURE DATE:  12/09/2019            INTERPRETATION:  Reason for Exam:  Productive cough. Shortness of breath.  CT of the chest was performed from the thoracic inlet to the level of the   adrenal glands without contrastinjection. Coronal and sagittal images   have been submitted.    Comparison: Chest x-ray-12/7/2019.    No prior thoracic CT scans    Findings:     Tubes/Lines: None    Lungs, Pleura, and Airways: No endobronchial obstruction.    Multi lobar patchy areas of nodular consolidation consistent with   pneumonia are noted involving right upper lobe, right middle lobe, both   lower lobes.    Trace bilateral pleural effusions, slightly larger on the right.   Mediastinum/Lymph Nodes:Approximate 1.1 cm right subcarinal lymph node   (4/132)    Subcentimeter paratracheal lymph nodes.    Heart/Great Vessels: No circumferential pericardial effusions. Fluid   within superior pericardial recess. Extensive coronary artery   calcifications consistent with coronary artery disease. Great vessels are   normal in caliber    Abdomen: Visualized non enhanced upper abdominal organs are unremarkable.    Bones and soft tissues: No suspicious focal osseous lesions.    IMPRESSION:      Multi lobar patchy areas of nodular consolidation, consistent with   pneumonia (involving right upper lobe, right middle lobe, both lower   lobes).    Trace bilateral pleural effusions, slightly larger on the right.     Likely reactive 1.1 cm right subcarinal lymph node.      Extensive coronary artery calcifications, consistent with coronary artery   disease.                    CHRIS GRECO M.D., ATTENDING RADIOLOGIST  This document has been electronically signed. Dec 10 2019  9:45AM             (12-09-19 @ 19:28)      CARDIOLOGY TESTING  12 Lead ECG:   Ventricular Rate 126 BPM    Atrial Rate 126 BPM    P-R Interval 150 ms    QRS Duration 88 ms    Q-T Interval 292 ms    QTC Calculation(Bezet) 422 ms    P Axis 48 degrees    R Axis 43 degrees    T Axis 45 degrees    Diagnosis Line Sinus tachycardia  Otherwise normal ECG    Confirmed by MARY GILBERT, ESTELLA ([id number]) on 12/7/2019 1:52:13 PM (12-07-19 @ 11:23)      MEDICATIONS  ALBUTerol    90 MICROgram(s) HFA Inhaler 1  albuterol/ipratropium for Nebulization 3  atorvastatin 10  benzocaine 15 mG/menthol 3.6 mG (Sugar-Free) Lozenge 1  cefepime   IVPB 2000  chlorhexidine 4% Liquid 1  enoxaparin Injectable 40  levoFLOXacin IVPB 750  levoFLOXacin IVPB   tiotropium 18 MICROgram(s) Capsule 1      ANTIBIOTICS:  cefepime   IVPB 2000 milliGRAM(s) IV Intermittent every 8 hours  levoFLOXacin IVPB 750 milliGRAM(s) IV Intermittent every 24 hours  levoFLOXacin IVPB          All available historical records have been reviewed

## 2019-12-12 NOTE — PROGRESS NOTE ADULT - SUBJECTIVE AND OBJECTIVE BOX
OVERNIGHT EVENTS: all over feels better, dec cough, phlegm, low grade fever T m 100.9, sputum cx neg    Vital Signs Last 24 Hrs  T(C): 36.7 (12 Dec 2019 05:56), Max: 38.3 (11 Dec 2019 16:30)  T(F): 98.1 (12 Dec 2019 05:56), Max: 100.9 (11 Dec 2019 16:30)  HR: 89 (12 Dec 2019 05:56) (89 - 113)  BP: 129/60 (12 Dec 2019 05:56) (113/66 - 129/72)  RR: 18 (12 Dec 2019 05:56) (16 - 18)  SpO2: 94% (12 Dec 2019 08:07) (94% - 95%)    PHYSICAL EXAMINATION:    GENERAL: The patient is awake and alert in no apparent distress.     HEENT: Head is normocephalic and atraumatic. Extraocular muscles are intact. Mucous membranes are moist.    NECK: Supple.    LUNGS: bibasilar crackles    HEART: Regular rate and rhythm without murmur.    ABDOMEN: Soft, nontender, and nondistended.      EXTREMITIES: Without any cyanosis, clubbing, rash, lesions or edema.    NEUROLOGIC: Grossly intact.    SKIN: No ulceration or induration present.      LABS:                        12.6   8.89  )-----------( 283      ( 12 Dec 2019 07:40 )             36.2     12-12    140  |  100  |  12  ----------------------------<  145<H>  4.1   |  23  |  0.8    Ca    9.1      12 Dec 2019 07:40  Mg     1.9     12-12    TPro  5.9<L>  /  Alb  3.2<L>  /  TBili  0.4  /  DBili  x   /  AST  18  /  ALT  30  /  AlkPhos  60  12-12                    Procalcitonin, Serum: 1.76 ng/mL (12-10-19 @ 11:42)        12-11-19 @ 07:01  -  12-12-19 @ 07:00  --------------------------------------------------------  IN: 100 mL / OUT: 0 mL / NET: 100 mL        MICROBIOLOGY:  Culture Results:   No growth (12-10 @ 03:00)  Culture Results:   Normal Respiratory Smitha present (12-10 @ 03:00)      MEDICATIONS  (STANDING):  ALBUTerol    90 MICROgram(s) HFA Inhaler 1 Puff(s) Inhalation every 4 hours  albuterol/ipratropium for Nebulization 3 milliLiter(s) Nebulizer every 6 hours  atorvastatin 10 milliGRAM(s) Oral at bedtime  benzocaine 15 mG/menthol 3.6 mG (Sugar-Free) Lozenge 1 Lozenge Oral two times a day  cefepime   IVPB 2000 milliGRAM(s) IV Intermittent every 8 hours  chlorhexidine 4% Liquid 1 Application(s) Topical <User Schedule>  enoxaparin Injectable 40 milliGRAM(s) SubCutaneous daily  levoFLOXacin IVPB 750 milliGRAM(s) IV Intermittent every 24 hours  levoFLOXacin IVPB      tiotropium 18 MICROgram(s) Capsule 1 Capsule(s) Inhalation daily    MEDICATIONS  (PRN):  acetaminophen   Tablet .. 650 milliGRAM(s) Oral every 6 hours PRN Temp greater or equal to 38C (100.4F)  guaiFENesin   Syrup  (Sugar-Free) 100 milliGRAM(s) Oral every 6 hours PRN Cough      RADIOLOGY & ADDITIONAL STUDIES:

## 2019-12-13 ENCOUNTER — TRANSCRIPTION ENCOUNTER (OUTPATIENT)
Age: 39
End: 2019-12-13

## 2019-12-13 VITALS
HEART RATE: 103 BPM | TEMPERATURE: 97 F | DIASTOLIC BLOOD PRESSURE: 61 MMHG | SYSTOLIC BLOOD PRESSURE: 105 MMHG | RESPIRATION RATE: 17 BRPM

## 2019-12-13 LAB
ANION GAP SERPL CALC-SCNC: 14 MMOL/L — SIGNIFICANT CHANGE UP (ref 7–14)
BUN SERPL-MCNC: 12 MG/DL — SIGNIFICANT CHANGE UP (ref 10–20)
CALCIUM SERPL-MCNC: 9 MG/DL — SIGNIFICANT CHANGE UP (ref 8.5–10.1)
CHLORIDE SERPL-SCNC: 100 MMOL/L — SIGNIFICANT CHANGE UP (ref 98–110)
CO2 SERPL-SCNC: 26 MMOL/L — SIGNIFICANT CHANGE UP (ref 17–32)
CREAT SERPL-MCNC: 0.7 MG/DL — SIGNIFICANT CHANGE UP (ref 0.7–1.5)
CULTURE RESULTS: SIGNIFICANT CHANGE UP
CULTURE RESULTS: SIGNIFICANT CHANGE UP
FUNGITELL: <31 PG/ML — SIGNIFICANT CHANGE UP
GALACTOMANNAN AG SERPL-ACNC: <0.5 INDEX — SIGNIFICANT CHANGE UP
GLUCOSE BLDC GLUCOMTR-MCNC: 171 MG/DL — HIGH (ref 70–99)
GLUCOSE BLDC GLUCOMTR-MCNC: 220 MG/DL — HIGH (ref 70–99)
GLUCOSE BLDC GLUCOMTR-MCNC: 238 MG/DL — HIGH (ref 70–99)
GLUCOSE SERPL-MCNC: 185 MG/DL — HIGH (ref 70–99)
HCT VFR BLD CALC: 35.6 % — LOW (ref 42–52)
HGB BLD-MCNC: 12.3 G/DL — LOW (ref 14–18)
MAGNESIUM SERPL-MCNC: 2 MG/DL — SIGNIFICANT CHANGE UP (ref 1.8–2.4)
MCHC RBC-ENTMCNC: 29 PG — SIGNIFICANT CHANGE UP (ref 27–31)
MCHC RBC-ENTMCNC: 34.6 G/DL — SIGNIFICANT CHANGE UP (ref 32–37)
MCV RBC AUTO: 84 FL — SIGNIFICANT CHANGE UP (ref 80–94)
NRBC # BLD: 0 /100 WBCS — SIGNIFICANT CHANGE UP (ref 0–0)
PLATELET # BLD AUTO: 281 K/UL — SIGNIFICANT CHANGE UP (ref 130–400)
POTASSIUM SERPL-MCNC: 4.1 MMOL/L — SIGNIFICANT CHANGE UP (ref 3.5–5)
POTASSIUM SERPL-SCNC: 4.1 MMOL/L — SIGNIFICANT CHANGE UP (ref 3.5–5)
PROCALCITONIN SERPL-MCNC: 0.51 NG/ML — HIGH (ref 0.02–0.1)
RBC # BLD: 4.24 M/UL — LOW (ref 4.7–6.1)
RBC # FLD: 12.5 % — SIGNIFICANT CHANGE UP (ref 11.5–14.5)
SODIUM SERPL-SCNC: 140 MMOL/L — SIGNIFICANT CHANGE UP (ref 135–146)
SPECIMEN SOURCE: SIGNIFICANT CHANGE UP
SPECIMEN SOURCE: SIGNIFICANT CHANGE UP
WBC # BLD: 5.9 K/UL — SIGNIFICANT CHANGE UP (ref 4.8–10.8)
WBC # FLD AUTO: 5.9 K/UL — SIGNIFICANT CHANGE UP (ref 4.8–10.8)

## 2019-12-13 RX ORDER — CEFEPIME 1 G/1
2 INJECTION, POWDER, FOR SOLUTION INTRAMUSCULAR; INTRAVENOUS
Qty: 0 | Refills: 0 | DISCHARGE
Start: 2019-12-13

## 2019-12-13 RX ORDER — CEFEPIME 1 G/1
2000 INJECTION, POWDER, FOR SOLUTION INTRAMUSCULAR; INTRAVENOUS EVERY 12 HOURS
Refills: 0 | Status: DISCONTINUED | OUTPATIENT
Start: 2019-12-13 | End: 2019-12-13

## 2019-12-13 RX ORDER — TIOTROPIUM BROMIDE 18 UG/1
1 CAPSULE ORAL; RESPIRATORY (INHALATION)
Qty: 14 | Refills: 0
Start: 2019-12-13 | End: 2019-12-26

## 2019-12-13 RX ORDER — ALBUTEROL 90 UG/1
1 AEROSOL, METERED ORAL
Qty: 1 | Refills: 0
Start: 2019-12-13 | End: 2019-12-26

## 2019-12-13 RX ORDER — ALBUTEROL 90 UG/1
1 AEROSOL, METERED ORAL
Qty: 0 | Refills: 0 | DISCHARGE
Start: 2019-12-13

## 2019-12-13 RX ORDER — CEFEPIME 1 G/1
2 INJECTION, POWDER, FOR SOLUTION INTRAMUSCULAR; INTRAVENOUS
Qty: 0 | Refills: 0 | DISCHARGE
Start: 2019-12-13 | End: 2019-12-24

## 2019-12-13 RX ADMIN — BENZOCAINE AND MENTHOL 1 LOZENGE: 5; 1 LIQUID ORAL at 18:14

## 2019-12-13 RX ADMIN — ENOXAPARIN SODIUM 40 MILLIGRAM(S): 100 INJECTION SUBCUTANEOUS at 13:55

## 2019-12-13 RX ADMIN — CEFEPIME 100 MILLIGRAM(S): 1 INJECTION, POWDER, FOR SOLUTION INTRAMUSCULAR; INTRAVENOUS at 13:55

## 2019-12-13 RX ADMIN — Medication 3 MILLILITER(S): at 09:39

## 2019-12-13 RX ADMIN — CEFEPIME 100 MILLIGRAM(S): 1 INJECTION, POWDER, FOR SOLUTION INTRAMUSCULAR; INTRAVENOUS at 06:29

## 2019-12-13 RX ADMIN — ATORVASTATIN CALCIUM 10 MILLIGRAM(S): 80 TABLET, FILM COATED ORAL at 06:29

## 2019-12-13 RX ADMIN — BENZOCAINE AND MENTHOL 1 LOZENGE: 5; 1 LIQUID ORAL at 06:29

## 2019-12-13 RX ADMIN — Medication 100 MILLIGRAM(S): at 18:14

## 2019-12-13 RX ADMIN — CEFEPIME 100 MILLIGRAM(S): 1 INJECTION, POWDER, FOR SOLUTION INTRAMUSCULAR; INTRAVENOUS at 18:14

## 2019-12-13 RX ADMIN — Medication 3 MILLILITER(S): at 14:53

## 2019-12-13 NOTE — DISCHARGE NOTE PROVIDER - NSDCFUADDAPPT_GEN_ALL_CORE_FT
Follow up with Dr. Marco A Mota  [address] [facility] [place]   [phone] (call to make an appointment, walk-ins Tuesdays 10:30 AM)  Fax [phone]

## 2019-12-13 NOTE — PROGRESS NOTE ADULT - ASSESSMENT
Sepsis ( with WBC of 19, temp 102.1, tachy 137/min,)  PNA  Hx of Type I DM   Hx of DLD  Hx of trigger finger    CXR reviewed + retrocardiac linear opacity  CT of the chest noncontrast: BL multilobe PNA  midline today  cont IV ABx:  Cefepime 2 gms q 12 x 14 days, end 12/24  nebulizer tx PRN  cont O2, check pulse ox daily  Pulmonary consult and ff up appreciated    ID consult and ff up appreciated  WBC trending down 19 to 8.8, afebrile   encourage oral fluids  pt med stable for D/C  on IC Cefepime till 12/24, ff up with ID, Pul and PMD

## 2019-12-13 NOTE — PROGRESS NOTE ADULT - ASSESSMENT
Impression    - Community acquired pneumonia ( risk factors DM/ recent abx usage) Fever trend improving, procalcitonin  decreasing feels better, afebrile    Recommendations    Keep sats > 92%   Duonebs prn  ABX ( CEFEPIME)  POX RA and amubllation  DVT px   BS control  OOB to chair  AMBULATE  will renny  attempt dc 24 / 48 h

## 2019-12-13 NOTE — DISCHARGE NOTE PROVIDER - CARE PROVIDER_API CALL
Jose Alejandro Sanderson)  Internal Medicine  [address] [place] [address], [address]  [place] [address]  Phone: [phone]  Fax: [phone]  Established Patient  Follow Up Time: 1 week Jose Alejandro Sanderson)  Internal Medicine  [address] [facility], [address]  [place] [address]  Phone: [phone]  Fax: [phone]  Established Patient  Follow Up Time: 1 week    Marco A Mota)  Infectious Disease; Internal Medicine  [address] [place] [address]  Phone: [phone]  Fax: [phone]  Established Patient  Follow Up Time: 1-3 days

## 2019-12-13 NOTE — PROCEDURE NOTE - NSPROCDETAILS_GEN_ALL_CORE
location identified, draped/prepped, sterile technique used/sterile technique, catheter placed/supine position/sterile dressing applied/ultrasound guidance

## 2019-12-13 NOTE — DISCHARGE NOTE PROVIDER - NSDCCPCAREPLAN_GEN_ALL_CORE_FT
PRINCIPAL DISCHARGE DIAGNOSIS  Diagnosis: Pneumonia  Assessment and Plan of Treatment: You presented to the hospital with a diagnosis suspicious for pneumonia. Your CT of the chest revealed evidence for a multifocal pneumonia, however infectious workup has turned up negative thus far. You were evaluated by the infectious disease specialists and are recommended to go home on 14 days of Cefepime with the midline that you were provided. Please follow up with your primary care physician in 1 week and take your medications as directed. If your condition acutely worsens or you find yourself with temperatures and unable to breathe, please return to the ED immediately.

## 2019-12-13 NOTE — DISCHARGE NOTE NURSING/CASE MANAGEMENT/SOCIAL WORK - PATIENT PORTAL LINK FT
You can access the FollowMyHealth Patient Portal offered by [facility] by registering at the following website: [email or web address]. By joining [employer]’s FollowMyHealth portal, you will also be able to view your health information using other applications (apps) compatible with our system.

## 2019-12-13 NOTE — DISCHARGE NOTE PROVIDER - CARE PROVIDERS DIRECT ADDRESSES
[email or web address]

## 2019-12-13 NOTE — DISCHARGE NOTE PROVIDER - PROVIDER TOKENS
PROVIDER:[TOKEN:[[id number]],FOLLOWUP:[1 week],ESTABLISHEDPATIENT:[T]] PROVIDER:[TOKEN:[[id number]],FOLLOWUP:[1 week],ESTABLISHEDPATIENT:[T]],PROVIDER:[TOKEN:[[id number]],FOLLOWUP:[1-3 days],ESTABLISHEDPATIENT:[T]]

## 2019-12-13 NOTE — PROGRESS NOTE ADULT - ASSESSMENT
39y Male with a PMH of DMI and HLD presents with a chief complaint of intermittent fever (Tmax 103) and productive cough (clear/yellow phlegm) for the past 3 weeks. Initially seen by urgent care on 11/23 and was on Levaquin and steroids with only temporary improvement in fever and worsening of cough. Pt diagnosed with pneumonia at urgent care on 12/7 and now presents to ED for further work-up    IMPRESSION  # Sepsis secondary to suspected atypical PNA, r/o GNR PNA r/o inflammatory    Procalcitonin, Serum: 1.76: (12.10.19 @ 11:42), RVP neg    CT Chest with multifocal opacities    Discussed with lab, admission sputum cx neg & repeat 12/10 neg, no PMNS (norovirus is an error)    ESR 63 CRP 20 ANCA neg    Fungitell: <31 (12-10-19 @ 11:42)    Blood & Urine cxs NGTD , Flu/RSV neg, legionella/strep neg, HIV neg    No real exposures, children at home sick, no vaping  #Sepsis on admission (T 102, , WBC 19.22) Tmax 102.1      RECOMMENDATIONS  - Pulm following   - Cefepime 2g q8h IV  - D/C with midline and IV cefepime 2g q12h IV end 12/24 14 days  - Weekly CBC, BMP  - ID follow-up with Zoie Mota 12/24       [address]       [phone] (call to make an appointment, walk-ins Tuesdays 10:30 AM)      Fax [phone]     Spectra [phone]

## 2019-12-13 NOTE — DISCHARGE NOTE PROVIDER - HOSPITAL COURSE
HPI:    40 y/o m w/ pmhx of type 1 dm, hld presents with ~ 3 weeks of productive cough and fever. Pt started to get productive cough with clear to yellow sputum, associated with intermittent fever about 3 weeks ago and went to urgent care on 11/23 where he was given Levaquin and steroids. Pt condition improved after that for a few days but then he started to get similar symptoms again so he went to urgent care again today and he was told that he has pneumonia and he should come to ER. Pt states that he has productive cough associated with generalized body aches and fever. The rest of his family members also have cough but his symptoms are worse. Pt has lost about 7 pounds in the last few weeks. Pt denied any hx of fever, chills, nausea, vomiting, diarrhea, constipation, melena, pain in abdomen, sob, chest pain, increased urinary frequency, dysuria, headache, lightheadedness, dizziness, vertigo, localized weakness or numbness/tingling.        In the ER pt was found to have fever with T max of 102 F, HR of 137, /68 mmHg, RR : 19, SPO2 94% on RA. (07 Dec 2019 14:04)        Patient was worked up for causes of a possible pneumonia. All workup negative, the patient had a CT chest done that demonstrated multifocal pneumonia. HPI:    38 y/o m w/ pmhx of type 1 dm, hld presents with ~ 3 weeks of productive cough and fever. Pt started to get productive cough with clear to yellow sputum, associated with intermittent fever about 3 weeks ago and went to urgent care on 11/23 where he was given Levaquin and steroids. Pt condition improved after that for a few days but then he started to get similar symptoms again so he went to urgent care again today and he was told that he has pneumonia and he should come to ER. Pt states that he has productive cough associated with generalized body aches and fever. The rest of his family members also have cough but his symptoms are worse. Pt has lost about 7 pounds in the last few weeks. Pt denied any hx of fever, chills, nausea, vomiting, diarrhea, constipation, melena, pain in abdomen, sob, chest pain, increased urinary frequency, dysuria, headache, lightheadedness, dizziness, vertigo, localized weakness or numbness/tingling.        In the ER pt was found to have fever with T max of 102 F, HR of 137, /68 mmHg, RR : 19, SPO2 94% on RA. (07 Dec 2019 14:04)        Patient was worked up for causes of a possible pneumonia. All workup negative, the patient had a CT chest done that demonstrated multifocal pneumonia. Bacterial and viral etiologies were ruled out via cultures and RVP/influenza testing. The patient had negative strep/legionella antigen and negative mycoplasma IgM. The patient was doing well clinically, except desatting on room air. His saturations improved to 93 on room air on 12/13. The patient had a midline placed for a 2 week course of cefepime as per infectious disease. The patient was agreeable to the terms surrounding his discharge and had an uneventful hospital stay.

## 2019-12-13 NOTE — PROGRESS NOTE ADULT - SUBJECTIVE AND OBJECTIVE BOX
OVERNIGHT EVENTS: feels better, dec cough, afebrile for 24 h, sob better, s/p echo. procalcitonin decreasing    Vital Signs Last 24 Hrs  T(C): 36.8 (13 Dec 2019 07:11), Max: 36.9 (12 Dec 2019 15:05)  T(F): 98.3 (13 Dec 2019 07:11), Max: 98.4 (12 Dec 2019 15:05)  HR: 80 (13 Dec 2019 07:11) (80 - 100)  BP: 117/68 (13 Dec 2019 07:11) (115/66 - 121/56)  RR: 18 (13 Dec 2019 07:11) (18 - 20)  SpO2: 98% (13 Dec 2019 07:11) (94% - 98%)    PHYSICAL EXAMINATION:    GENERAL: The patient is awake and alert in no apparent distress.     HEENT: Head is normocephalic and atraumatic. Extraocular muscles are intact. Mucous membranes are moist.    NECK: Supple.    LUNGS: b/l crackles (improved)    HEART: Regular rate and rhythm without murmur.    ABDOMEN: Soft, nontender, and nondistended.      EXTREMITIES: Without any cyanosis, clubbing, rash, lesions or edema.    NEUROLOGIC: Grossly intact.    SKIN: No ulceration or induration present.      LABS:                        12.6   8.89  )-----------( 283      ( 12 Dec 2019 07:40 )             36.2     12-12    140  |  100  |  12  ----------------------------<  145<H>  4.1   |  23  |  0.8    Ca    9.1      12 Dec 2019 07:40  Mg     1.9     12-12    TPro  5.9<L>  /  Alb  3.2<L>  /  TBili  0.4  /  DBili  x   /  AST  18  /  ALT  30  /  AlkPhos  60  12-12                    Procalcitonin, Serum: 0.51 ng/mL (12-12-19 @ 19:31)  Procalcitonin, Serum: 1.76 ng/mL (12-10-19 @ 11:42)          MICROBIOLOGY:  Culture Results:   No growth (12-10 @ 03:00)  Culture Results:   Normal Respiratory [place] present (12-10 @ 03:00)      MEDICATIONS  (STANDING):  ALBUTerol    90 MICROgram(s) HFA Inhaler 1 Puff(s) Inhalation every 4 hours  albuterol/ipratropium for Nebulization 3 milliLiter(s) Nebulizer every 6 hours  atorvastatin 10 milliGRAM(s) Oral at bedtime  benzocaine 15 mG/menthol 3.6 mG (Sugar-Free) Lozenge 1 Lozenge Oral two times a day  cefepime   IVPB 2000 milliGRAM(s) IV Intermittent every 8 hours  chlorhexidine 4% Liquid 1 Application(s) Topical <User Schedule>  enoxaparin Injectable 40 milliGRAM(s) SubCutaneous daily  tiotropium 18 MICROgram(s) Capsule 1 Capsule(s) Inhalation daily    MEDICATIONS  (PRN):  acetaminophen   Tablet .. 650 milliGRAM(s) Oral every 6 hours PRN Temp greater or equal to 38C (100.4F)  guaiFENesin   Syrup  (Sugar-Free) 100 milliGRAM(s) Oral every 6 hours PRN Cough      RADIOLOGY & ADDITIONAL STUDIES:

## 2019-12-13 NOTE — DISCHARGE NOTE PROVIDER - NSDCMRMEDTOKEN_GEN_ALL_CORE_FT
HumaLOG Cartridge 100 units/mL subcutaneous solution:   Lipitor 10 mg oral tablet: 1 tab(s) orally once a day cefepime 2 g intravenous injection: 2 gram(s) intravenous every 8 hours  guaiFENesin 100 mg/5 mL oral liquid: 5 milliliter(s) orally every 6 hours, As needed, Cough  HumaLOG Cartridge 100 units/mL subcutaneous solution:   Lipitor 10 mg oral tablet: 1 tab(s) orally once a day albuterol 90 mcg/inh inhalation aerosol: 1 puff(s) inhaled every 4 hours  cefepime 2 g intravenous injection: 2 gram(s) intravenous every 8 hours  guaiFENesin 100 mg/5 mL oral liquid: 5 milliliter(s) orally every 6 hours, As needed, Cough  HumaLOG Cartridge 100 units/mL subcutaneous solution:   Lipitor 10 mg oral tablet: 1 tab(s) orally once a day albuterol 90 mcg/inh inhalation aerosol: 1 puff(s) inhaled every 4 hours  cefepime 2 g intravenous injection: 2 gram(s) intravenous every 12 hours  guaiFENesin 100 mg/5 mL oral liquid: 5 milliliter(s) orally every 6 hours, As needed, Cough  HumaLOG Cartridge 100 units/mL subcutaneous solution:   Lipitor 10 mg oral tablet: 1 tab(s) orally once a day  tiotropium 18 mcg inhalation capsule: 1 cap(s) inhaled once a day

## 2019-12-13 NOTE — PROGRESS NOTE ADULT - ASSESSMENT
40 y/o m w/ pmhx of type 1 dm, hld presents with ~ 3 weeks of productive cough and fever. Pt started to get productive cough with clear to yellow sputum, associated with intermittent fever about 3 weeks ago and went to urgent care on 11/23 where he was given Levaquin and steroids. Pt condition improved after that for a few days but then he started to get similar symptoms again so he went to urgent care again today and he was told that he has pneumonia and he should come to ER.    # Fever with productive cough, likely sepsis on admission  - Still tachycardic  - Leukocytosis resolved (19.22 on admission)  - CT Chest shows multiple lobar patchy areas consistent with pneumonia  - CXR shows linear retrocardiac opacity, likely atelectasis  - ESR (63), Pro-calcitonin (1.76) and CRP (20.6) elevated  - UA negative for infection  - Glucosuria and ketones on UA on admission  - RVP negative, MRSA PCR negative, Flu negative, HIV negative, Blood cultures negative x4, Urine culture negative  - ID on case, c/w Cefepime and Levoquin  - Pulm following  - Urine Strep / Legionella negative  - F/U Aspergillus, Fungitell  - Mycoplasma IgM, ANCA WNL  - Sputum shows several rare bacterial species  - Consider heme/onc c/s to work-up weight loss  - F/U PPD  - May need bronchoscopy?    # Sinus tachycardia likely from fever  - TSH normal    # DM Type 1  - Patient has insulin pump and wants to continue using it  - Monitor finger stick AC+ HS  - Glucosuria on UA on admission    # DLD  - c/w statins    DVT PPx:  Lovenox 40 mg s/c  GI PPX:  not indicated  Diet: DASH/Carb consistent  Activity: Increase as tolerated  Dispo: from home, no needs  Code Status: full code 40 y/o m w/ pmhx of type 1 dm, hld presents with ~ 3 weeks of productive cough and fever. Pt started to get productive cough with clear to yellow sputum, associated with intermittent fever about 3 weeks ago and went to urgent care on 11/23 where he was given Levaquin and steroids. Pt condition improved after that for a few days but then he started to get similar symptoms again so he went to urgent care again today and he was told that he has pneumonia and he should come to ER.    # Fever with productive cough, likely sepsis on admission  - feels better today  - Leukocytosis resolved (19.22 on admission)  - CT Chest shows multiple lobar patchy areas consistent with pneumonia  - CXR shows linear retrocardiac opacity, likely atelectasis  - ESR (63), Pro-calcitonin (1.76) and CRP (20.6) elevated  - Pro-calcitonin trending down  - UA negative for infection  - Glucosuria and ketones on UA on admission  - RVP negative, MRSA PCR negative, Flu negative, HIV negative, Blood cultures negative x4, Urine culture negative  - ID on case, c/w Cefepime, DC Levoquin  - Pulm following  - Urine Strep / Legionella negative  - F/U Aspergillus, Fungitell  - Mycoplasma IgM, ANCA WNL  - Sputum shows several rare bacterial species  - F/U PPD    # Sinus tachycardia likely from fever  - TSH normal    # DM Type 1  - Patient has insulin pump and wants to continue using it  - Monitor finger stick AC+ HS  - Glucosuria on UA on admission    # DLD  - c/w statins    DVT PPx:  Lovenox 40 mg s/c  GI PPX:  not indicated  Diet: DASH/Carb consistent  Activity: Increase as tolerated  Dispo: from home, no needs  Code Status: full code

## 2019-12-13 NOTE — PROGRESS NOTE ADULT - SUBJECTIVE AND OBJECTIVE BOX
HPI  Patient is a 39y old Male who presents with a chief complaint of Sepsis, Leukocytosis, fever with productive cough, BL multilobar PNA (12 Dec 2019 15:07)    Currently admitted to medicine with the primary diagnosis of Sepsis     Today is hospital day 6d.     INTERVAL HPI / OVERNIGHT EVENTS:  Patient was examined and seen at bedside. This morning he is resting comfortably in bed and reports no new issues or overnight events. Patient was on NC, conversational, denied CP at rest. Still desaturates when taken off NC. Ambulates well. No changes in BM or urinary habits. Still coughing up whitish/yellowish sputum. May need bronchoscopy.    ROS: Otherwise unremarkable     PAST MEDICAL & SURGICAL HISTORY  Diabetes, type I  History of trigger finger    ALLERGIES  No Known Allergies    MEDICATIONS  STANDING MEDICATIONS  ALBUTerol    90 MICROgram(s) HFA Inhaler 1 Puff(s) Inhalation every 4 hours  albuterol/ipratropium for Nebulization 3 milliLiter(s) Nebulizer every 6 hours  atorvastatin 10 milliGRAM(s) Oral at bedtime  benzocaine 15 mG/menthol 3.6 mG (Sugar-Free) Lozenge 1 Lozenge Oral two times a day  cefepime   IVPB 2000 milliGRAM(s) IV Intermittent every 8 hours  chlorhexidine 4% Liquid 1 Application(s) Topical <User Schedule>  enoxaparin Injectable 40 milliGRAM(s) SubCutaneous daily  levoFLOXacin IVPB 750 milliGRAM(s) IV Intermittent every 24 hours  levoFLOXacin IVPB      tiotropium 18 MICROgram(s) Capsule 1 Capsule(s) Inhalation daily    PRN MEDICATIONS  acetaminophen   Tablet .. 650 milliGRAM(s) Oral every 6 hours PRN  guaiFENesin   Syrup  (Sugar-Free) 100 milliGRAM(s) Oral every 6 hours PRN    VITALS:  T(F): 98.3  HR: 80  BP: 117/68  RR: 18  SpO2: 98%    PHYSICAL EXAM  GEN: NAD, Resting comfortably in bed  PULM: Clear to auscultation bilaterally, No wheezes  CVS: Fast rate and regular rhythm, S1-S2, no murmurs  ABD: Somewhat firm, non-tender, non-distended, no guarding  EXT: No edema  NEURO: AAOx3, no focal deficits      LABS                        12.6   8.89  )-----------( 283      ( 12 Dec 2019 07:40 )             36.2     12-12    140  |  100  |  12  ----------------------------<  145<H>  4.1   |  23  |  0.8    Ca    9.1      12 Dec 2019 07:40  Mg     1.9     12-12    TPro  5.9<L>  /  Alb  3.2<L>  /  TBili  0.4  /  DBili  x   /  AST  18  /  ALT  30  /  AlkPhos  60  12-12        RADIOLOGY    < from: CT Chest No Cont (12.09.19 @ 19:28) >  IMPRESSION:      Multi lobar patchy areas of nodular consolidation, consistent with   pneumonia (involving right upper lobe, right middle lobe, both lower   lobes).    Trace bilateral pleural effusions, slightly larger on the right.     Likely reactive 1.1 cm right subcarinal lymph node.      Extensive coronary artery calcifications, consistent with coronary artery   disease.    < end of copied text >    < from: Xray Chest 2 Views PA/Lat (12.07.19 @ 11:34) >  Impression:    Support devices: None.    Cardiac/mediastinum/hilum: Unremarkable.    Lung parenchyma/Pleura: Retrocardiac linear opacity, likely atelectasis.   Right lung is unremarkable. No evidence of pneumothorax.    Skeleton/soft tissues: Stable    < end of copied text >    < from: Transthoracic Echocardiogram (12.12.19 @ 12:50) >    Summary:   1. Left ventricular ejection fraction, by visual estimation, is 55 to   60%.   2. Mild mitral valve regurgitation.    < end of copied text > HPI  Patient is a 39y old Male who presents with a chief complaint of Sepsis, Leukocytosis, fever with productive cough, BL multilobar PNA (12 Dec 2019 15:07)    Currently admitted to medicine with the primary diagnosis of Sepsis     Today is hospital day 6d.     INTERVAL HPI / OVERNIGHT EVENTS:  Patient was examined and seen at bedside. This morning he is resting comfortably in bed and reports no new issues or overnight events. Patient was on NC, conversational, denied CP at rest. Feels better this morning. Uses incentive spirometer. Still has low 90's saturation without NC. Awaiting fungal workup. Holding off on bronchoscopy for now.    ROS: Otherwise unremarkable     PAST MEDICAL & SURGICAL HISTORY  Diabetes, type I  History of trigger finger    ALLERGIES  No Known Allergies    MEDICATIONS  STANDING MEDICATIONS  ALBUTerol    90 MICROgram(s) HFA Inhaler 1 Puff(s) Inhalation every 4 hours  albuterol/ipratropium for Nebulization 3 milliLiter(s) Nebulizer every 6 hours  atorvastatin 10 milliGRAM(s) Oral at bedtime  benzocaine 15 mG/menthol 3.6 mG (Sugar-Free) Lozenge 1 Lozenge Oral two times a day  cefepime   IVPB 2000 milliGRAM(s) IV Intermittent every 8 hours  chlorhexidine 4% Liquid 1 Application(s) Topical <User Schedule>  enoxaparin Injectable 40 milliGRAM(s) SubCutaneous daily  levoFLOXacin IVPB 750 milliGRAM(s) IV Intermittent every 24 hours  levoFLOXacin IVPB      tiotropium 18 MICROgram(s) Capsule 1 Capsule(s) Inhalation daily    PRN MEDICATIONS  acetaminophen   Tablet .. 650 milliGRAM(s) Oral every 6 hours PRN  guaiFENesin   Syrup  (Sugar-Free) 100 milliGRAM(s) Oral every 6 hours PRN    VITALS:  T(F): 98.3  HR: 80  BP: 117/68  RR: 18  SpO2: 98%    PHYSICAL EXAM  GEN: NAD, Resting comfortably in bed  PULM: Clear to auscultation bilaterally, No wheezes  CVS: S1-S2, no murmurs  ABD: Soft, non-tender, non-distended, no guarding  EXT: No edema  NEURO: AAOx3, no focal deficits      LABS                        12.6   8.89  )-----------( 283      ( 12 Dec 2019 07:40 )             36.2     12-12    140  |  100  |  12  ----------------------------<  145<H>  4.1   |  23  |  0.8    Ca    9.1      12 Dec 2019 07:40  Mg     1.9     12-12    TPro  5.9<L>  /  Alb  3.2<L>  /  TBili  0.4  /  DBili  x   /  AST  18  /  ALT  30  /  AlkPhos  60  12-12        RADIOLOGY    < from: CT Chest No Cont (12.09.19 @ 19:28) >  IMPRESSION:      Multi lobar patchy areas of nodular consolidation, consistent with   pneumonia (involving right upper lobe, right middle lobe, both lower   lobes).    Trace bilateral pleural effusions, slightly larger on the right.     Likely reactive 1.1 cm right subcarinal lymph node.      Extensive coronary artery calcifications, consistent with coronary artery   disease.    < end of copied text >    < from: Xray Chest 2 Views PA/Lat (12.07.19 @ 11:34) >  Impression:    Support devices: None.    Cardiac/mediastinum/hilum: Unremarkable.    Lung parenchyma/Pleura: Retrocardiac linear opacity, likely atelectasis.   Right lung is unremarkable. No evidence of pneumothorax.    Skeleton/soft tissues: Stable    < end of copied text >    < from: Transthoracic Echocardiogram (12.12.19 @ 12:50) >    Summary:   1. Left ventricular ejection fraction, by visual estimation, is 55 to   60%.   2. Mild mitral valve regurgitation.    < end of copied text >

## 2019-12-13 NOTE — PROGRESS NOTE ADULT - SUBJECTIVE AND OBJECTIVE BOX
LIZ ALVARADO 40yo  Male came to the ER for c/o cont cough with fever of 102.1, productive cough and leukocytosis of 19.  The pt has Type I DM, and has had cough and fever vahe dn off x 3 weeks.  The pt was seen in urgent care and was given Levaquin.  The Sx initially improved but then resumed and pt had 2nd visit to the urgent care and was told to go to the ER for IV Abx and the working dx of PNA.  In the ER the pt had temp of 102.1, WBC of 19 and HR of 137, thus, Sepsis on admission. The pt was cultured and placed on IV Ceftriaxone and azithromycin. The initial CXR showed retrocardiac linear opacity.   The pt is being evaluated by ID and Pul.  The PMHx includes Type I DM, + insulin pump, trigger finger.    INTERVAL HPI/OVERNIGHT EVENTS: afebrile, clinically improved, midline today as per ID Cefepime 2gms q 12 till 12/24, D/C if home SVC can be arranged    MEDICATIONS  (STANDING):  ALBUTerol    90 MICROgram(s) HFA Inhaler 1 Puff(s) Inhalation every 4 hours  albuterol/ipratropium for Nebulization 3 milliLiter(s) Nebulizer every 6 hours  atorvastatin 10 milliGRAM(s) Oral at bedtime  benzocaine 15 mG/menthol 3.6 mG (Sugar-Free) Lozenge 1 Lozenge Oral two times a day  chlorhexidine 4% Liquid 1 Application(s) Topical <User Schedule>  enoxaparin Injectable 40 milliGRAM(s) SubCutaneous daily    tiotropium 18 MICROgram(s) Capsule 1 Capsule(s) Inhalation daily  Levaquin 750mg q 24  Cefepime 2gms q 8      MEDICATIONS  (PRN):  acetaminophen   Tablet .. 650 milliGRAM(s) Oral every 6 hours PRN Temp greater or equal to 38C (100.4F), Mild Pain (1 - 3)  guaiFENesin   Syrup  (Sugar-Free) 100 milliGRAM(s) Oral every 6 hours PRN Cough      Allergies    No Known Allergies    Vital Signs Last 24 Hrs    T(F): 97.7  HR: 94  BP: 111/55  RR: 18  SpO2: 98%    PHYSICAL EXAM:      Constitutional:  pt is alert and oriented x 3, comfortable    Eyes:  non icteric    ENMT:  dry oral mucosa    Neck:  supple, no JVD, no bruits    Respiratory: shallow resp, scattered rhonchi, no wheezing    Cardiovascular:  S1S2, tachy, no murmur    Gastrointestinal:  soft and benign + insulin pump    Genitourinary:  no garzon    Extremities:  moves all ext    Vascular:  + pedal pulses    Neurological:  nonfocal    Skin:  nor rash, in warmth    Lymph Nodes:  not enlarged    Musculoskeletal: normal mm mass and tone    LABS:                        12  5.9 )-----------( 287   WBC on ad:  19, 13.9,              35.6    140  |  100  |  12  ----------------------------< 185  4.1   |  26  |  0.7      Ca    8.5       Mg     1.9  La 1.8, 1.3  TPro  5.9  /  Alb  3.2  /  TBili  0.8  /  DBili  x   /  AST  15  /  ALT  19  /  AlkPhos  59  12-08  procalcitonin 1.76  ( 0-0.6)  ESR 63  C-RP 20.6     blood C&S x2 neg  Urine neg  strept pneumoniae AG neg  Legionella  u Ag neg  MRSA of nares neg  RVP neg  C ANCA, P ANCA, atypical ANCA all neg      urine + ketones    RADIOLOGY & ADDITIONAL TESTS:    EKG:  sinus tach, no acute changes  CXR:  retrocardiac linear opacity, R lung clear  CT of lungs:  multi lobar patchy consolidations c/w PNA of the R upper, R middle lobes and BL lower lobes, trace pl fluid, mno pericardial fluid, 1.1 cm subcarinal LN,  subcm paratrach LN

## 2019-12-13 NOTE — PROGRESS NOTE ADULT - REASON FOR ADMISSION
Sepsis, Leukocytosis, fever with productive cough, BL multilobar PNA
Sepsis, fever 102 with productive cough and elevated WBC of 19
fever with productive cough
fever with productive cough, sepsis PNA
fever with productive cough/Sepsis/PNA
fever with productive cough

## 2019-12-18 DIAGNOSIS — E78.5 HYPERLIPIDEMIA, UNSPECIFIED: ICD-10-CM

## 2019-12-18 DIAGNOSIS — A41.9 SEPSIS, UNSPECIFIED ORGANISM: ICD-10-CM

## 2019-12-18 DIAGNOSIS — E10.9 TYPE 1 DIABETES MELLITUS WITHOUT COMPLICATIONS: ICD-10-CM

## 2019-12-18 DIAGNOSIS — Z96.41 PRESENCE OF INSULIN PUMP (EXTERNAL) (INTERNAL): ICD-10-CM

## 2019-12-18 DIAGNOSIS — Z79.4 LONG TERM (CURRENT) USE OF INSULIN: ICD-10-CM

## 2019-12-18 DIAGNOSIS — J18.9 PNEUMONIA, UNSPECIFIED ORGANISM: ICD-10-CM

## 2020-02-19 ENCOUNTER — INPATIENT (INPATIENT)
Facility: HOSPITAL | Age: 40
LOS: 4 days | Discharge: HOME HEALTH | End: 2020-02-24
Attending: THORACIC SURGERY (CARDIOTHORACIC VASCULAR SURGERY) | Admitting: THORACIC SURGERY (CARDIOTHORACIC VASCULAR SURGERY)
Payer: COMMERCIAL

## 2020-02-19 VITALS — HEIGHT: 70 IN

## 2020-02-19 DIAGNOSIS — I25.10 ATHEROSCLEROTIC HEART DISEASE OF NATIVE CORONARY ARTERY WITHOUT ANGINA PECTORIS: ICD-10-CM

## 2020-02-19 DIAGNOSIS — Z87.39 PERSONAL HISTORY OF OTHER DISEASES OF THE MUSCULOSKELETAL SYSTEM AND CONNECTIVE TISSUE: Chronic | ICD-10-CM

## 2020-02-19 LAB
ALBUMIN SERPL ELPH-MCNC: 4.1 G/DL — SIGNIFICANT CHANGE UP (ref 3.5–5.2)
ALP SERPL-CCNC: 72 U/L — SIGNIFICANT CHANGE UP (ref 30–115)
ALT FLD-CCNC: 17 U/L — SIGNIFICANT CHANGE UP (ref 0–41)
ANION GAP SERPL CALC-SCNC: 11 MMOL/L — SIGNIFICANT CHANGE UP (ref 7–14)
ANION GAP SERPL CALC-SCNC: 12 MMOL/L — SIGNIFICANT CHANGE UP (ref 7–14)
AST SERPL-CCNC: 15 U/L — SIGNIFICANT CHANGE UP (ref 0–41)
BILIRUB SERPL-MCNC: 0.4 MG/DL — SIGNIFICANT CHANGE UP (ref 0.2–1.2)
BUN SERPL-MCNC: 15 MG/DL — SIGNIFICANT CHANGE UP (ref 10–20)
BUN SERPL-MCNC: 17 MG/DL — SIGNIFICANT CHANGE UP (ref 10–20)
CALCIUM SERPL-MCNC: 9.1 MG/DL — SIGNIFICANT CHANGE UP (ref 8.5–10.1)
CALCIUM SERPL-MCNC: 9.6 MG/DL — SIGNIFICANT CHANGE UP (ref 8.5–10.1)
CHLORIDE SERPL-SCNC: 101 MMOL/L — SIGNIFICANT CHANGE UP (ref 98–110)
CHLORIDE SERPL-SCNC: 102 MMOL/L — SIGNIFICANT CHANGE UP (ref 98–110)
CO2 SERPL-SCNC: 25 MMOL/L — SIGNIFICANT CHANGE UP (ref 17–32)
CO2 SERPL-SCNC: 25 MMOL/L — SIGNIFICANT CHANGE UP (ref 17–32)
CREAT SERPL-MCNC: 0.7 MG/DL — SIGNIFICANT CHANGE UP (ref 0.7–1.5)
CREAT SERPL-MCNC: 0.8 MG/DL — SIGNIFICANT CHANGE UP (ref 0.7–1.5)
ESTIMATED AVERAGE GLUCOSE: 183 MG/DL — HIGH (ref 68–114)
FLU A RESULT: NEGATIVE — SIGNIFICANT CHANGE UP
FLU A RESULT: NEGATIVE — SIGNIFICANT CHANGE UP
FLUAV AG NPH QL: NEGATIVE — SIGNIFICANT CHANGE UP
FLUBV AG NPH QL: NEGATIVE — SIGNIFICANT CHANGE UP
GLUCOSE BLDC GLUCOMTR-MCNC: 139 MG/DL — HIGH (ref 70–99)
GLUCOSE BLDC GLUCOMTR-MCNC: 179 MG/DL — HIGH (ref 70–99)
GLUCOSE SERPL-MCNC: 149 MG/DL — HIGH (ref 70–99)
GLUCOSE SERPL-MCNC: 249 MG/DL — HIGH (ref 70–99)
HBA1C BLD-MCNC: 8 % — HIGH (ref 4–5.6)
HCT VFR BLD CALC: 41.2 % — LOW (ref 42–52)
HCT VFR BLD CALC: 45.3 % — SIGNIFICANT CHANGE UP (ref 42–52)
HGB BLD-MCNC: 15.1 G/DL — SIGNIFICANT CHANGE UP (ref 14–18)
HGB BLD-MCNC: 16.6 G/DL — SIGNIFICANT CHANGE UP (ref 14–18)
MAGNESIUM SERPL-MCNC: 1.8 MG/DL — SIGNIFICANT CHANGE UP (ref 1.8–2.4)
MCHC RBC-ENTMCNC: 29.4 PG — SIGNIFICANT CHANGE UP (ref 27–31)
MCHC RBC-ENTMCNC: 29.6 PG — SIGNIFICANT CHANGE UP (ref 27–31)
MCHC RBC-ENTMCNC: 36.6 G/DL — SIGNIFICANT CHANGE UP (ref 32–37)
MCHC RBC-ENTMCNC: 36.7 G/DL — SIGNIFICANT CHANGE UP (ref 32–37)
MCV RBC AUTO: 80.2 FL — SIGNIFICANT CHANGE UP (ref 80–94)
MCV RBC AUTO: 80.7 FL — SIGNIFICANT CHANGE UP (ref 80–94)
MRSA PCR RESULT.: NEGATIVE — SIGNIFICANT CHANGE UP
NRBC # BLD: 0 /100 WBCS — SIGNIFICANT CHANGE UP (ref 0–0)
NRBC # BLD: 0 /100 WBCS — SIGNIFICANT CHANGE UP (ref 0–0)
NT-PROBNP SERPL-SCNC: 26 PG/ML — SIGNIFICANT CHANGE UP (ref 0–300)
PLATELET # BLD AUTO: 251 K/UL — SIGNIFICANT CHANGE UP (ref 130–400)
PLATELET # BLD AUTO: 262 K/UL — SIGNIFICANT CHANGE UP (ref 130–400)
POTASSIUM SERPL-MCNC: 4.4 MMOL/L — SIGNIFICANT CHANGE UP (ref 3.5–5)
POTASSIUM SERPL-MCNC: 5 MMOL/L — SIGNIFICANT CHANGE UP (ref 3.5–5)
POTASSIUM SERPL-SCNC: 4.4 MMOL/L — SIGNIFICANT CHANGE UP (ref 3.5–5)
POTASSIUM SERPL-SCNC: 5 MMOL/L — SIGNIFICANT CHANGE UP (ref 3.5–5)
PROT SERPL-MCNC: 6.6 G/DL — SIGNIFICANT CHANGE UP (ref 6–8)
RBC # BLD: 5.14 M/UL — SIGNIFICANT CHANGE UP (ref 4.7–6.1)
RBC # BLD: 5.61 M/UL — SIGNIFICANT CHANGE UP (ref 4.7–6.1)
RBC # FLD: 12.8 % — SIGNIFICANT CHANGE UP (ref 11.5–14.5)
RBC # FLD: 12.8 % — SIGNIFICANT CHANGE UP (ref 11.5–14.5)
RSV RESULT: NEGATIVE — SIGNIFICANT CHANGE UP
RSV RNA RESP QL NAA+PROBE: NEGATIVE — SIGNIFICANT CHANGE UP
SODIUM SERPL-SCNC: 138 MMOL/L — SIGNIFICANT CHANGE UP (ref 135–146)
SODIUM SERPL-SCNC: 138 MMOL/L — SIGNIFICANT CHANGE UP (ref 135–146)
WBC # BLD: 6.93 K/UL — SIGNIFICANT CHANGE UP (ref 4.8–10.8)
WBC # BLD: 7.17 K/UL — SIGNIFICANT CHANGE UP (ref 4.8–10.8)
WBC # FLD AUTO: 6.93 K/UL — SIGNIFICANT CHANGE UP (ref 4.8–10.8)
WBC # FLD AUTO: 7.17 K/UL — SIGNIFICANT CHANGE UP (ref 4.8–10.8)

## 2020-02-19 PROCEDURE — 93880 EXTRACRANIAL BILAT STUDY: CPT | Mod: 26

## 2020-02-19 PROCEDURE — 99222 1ST HOSP IP/OBS MODERATE 55: CPT | Mod: 57

## 2020-02-19 RX ORDER — ATORVASTATIN CALCIUM 80 MG/1
10 TABLET, FILM COATED ORAL AT BEDTIME
Refills: 0 | Status: DISCONTINUED | OUTPATIENT
Start: 2020-02-19 | End: 2020-02-21

## 2020-02-19 RX ORDER — PANTOPRAZOLE SODIUM 20 MG/1
40 TABLET, DELAYED RELEASE ORAL
Refills: 0 | Status: DISCONTINUED | OUTPATIENT
Start: 2020-02-19 | End: 2020-02-24

## 2020-02-19 RX ORDER — DEXTROSE 50 % IN WATER 50 %
15 SYRINGE (ML) INTRAVENOUS ONCE
Refills: 0 | Status: DISCONTINUED | OUTPATIENT
Start: 2020-02-19 | End: 2020-02-24

## 2020-02-19 RX ORDER — INSULIN LISPRO 100/ML
0 VIAL (ML) SUBCUTANEOUS
Qty: 0 | Refills: 0 | DISCHARGE

## 2020-02-19 RX ORDER — METOPROLOL TARTRATE 50 MG
12.5 TABLET ORAL ONCE
Refills: 0 | Status: COMPLETED | OUTPATIENT
Start: 2020-02-20 | End: 2020-02-20

## 2020-02-19 RX ORDER — INSULIN HUMAN 100 [IU]/ML
1 INJECTION, SOLUTION SUBCUTANEOUS
Refills: 0 | Status: DISCONTINUED | OUTPATIENT
Start: 2020-02-19 | End: 2020-02-24

## 2020-02-19 RX ORDER — SODIUM CHLORIDE 9 MG/ML
1000 INJECTION INTRAMUSCULAR; INTRAVENOUS; SUBCUTANEOUS
Refills: 0 | Status: DISCONTINUED | OUTPATIENT
Start: 2020-02-19 | End: 2020-02-21

## 2020-02-19 RX ORDER — CHLORHEXIDINE GLUCONATE 213 G/1000ML
1 SOLUTION TOPICAL
Refills: 0 | Status: DISCONTINUED | OUTPATIENT
Start: 2020-02-19 | End: 2020-02-21

## 2020-02-19 RX ORDER — GLUCAGON INJECTION, SOLUTION 0.5 MG/.1ML
1 INJECTION, SOLUTION SUBCUTANEOUS ONCE
Refills: 0 | Status: DISCONTINUED | OUTPATIENT
Start: 2020-02-19 | End: 2020-02-24

## 2020-02-19 RX ORDER — SODIUM CHLORIDE 9 MG/ML
1000 INJECTION, SOLUTION INTRAVENOUS
Refills: 0 | Status: DISCONTINUED | OUTPATIENT
Start: 2020-02-19 | End: 2020-02-24

## 2020-02-19 RX ORDER — METOPROLOL TARTRATE 50 MG
12.5 TABLET ORAL EVERY 12 HOURS
Refills: 0 | Status: DISCONTINUED | OUTPATIENT
Start: 2020-02-19 | End: 2020-02-19

## 2020-02-19 RX ORDER — HEPARIN SODIUM 5000 [USP'U]/ML
5000 INJECTION INTRAVENOUS; SUBCUTANEOUS EVERY 8 HOURS
Refills: 0 | Status: DISCONTINUED | OUTPATIENT
Start: 2020-02-19 | End: 2020-02-19

## 2020-02-19 RX ORDER — DEXTROSE 50 % IN WATER 50 %
25 SYRINGE (ML) INTRAVENOUS ONCE
Refills: 0 | Status: DISCONTINUED | OUTPATIENT
Start: 2020-02-19 | End: 2020-02-21

## 2020-02-19 RX ORDER — DEXTROSE 50 % IN WATER 50 %
25 SYRINGE (ML) INTRAVENOUS ONCE
Refills: 0 | Status: DISCONTINUED | OUTPATIENT
Start: 2020-02-19 | End: 2020-02-24

## 2020-02-19 RX ORDER — INSULIN LISPRO 100/ML
VIAL (ML) SUBCUTANEOUS
Refills: 0 | Status: DISCONTINUED | OUTPATIENT
Start: 2020-02-19 | End: 2020-02-21

## 2020-02-19 RX ORDER — ASPIRIN/CALCIUM CARB/MAGNESIUM 324 MG
81 TABLET ORAL DAILY
Refills: 0 | Status: DISCONTINUED | OUTPATIENT
Start: 2020-02-19 | End: 2020-02-21

## 2020-02-19 RX ORDER — CHLORHEXIDINE GLUCONATE 213 G/1000ML
1 SOLUTION TOPICAL ONCE
Refills: 0 | Status: COMPLETED | OUTPATIENT
Start: 2020-02-19 | End: 2020-02-19

## 2020-02-19 RX ORDER — CHLORHEXIDINE GLUCONATE 213 G/1000ML
15 SOLUTION TOPICAL ONCE
Refills: 0 | Status: DISCONTINUED | OUTPATIENT
Start: 2020-02-19 | End: 2020-02-21

## 2020-02-19 RX ORDER — DEXTROSE 50 % IN WATER 50 %
12.5 SYRINGE (ML) INTRAVENOUS ONCE
Refills: 0 | Status: DISCONTINUED | OUTPATIENT
Start: 2020-02-19 | End: 2020-02-24

## 2020-02-19 RX ADMIN — Medication 81 MILLIGRAM(S): at 22:04

## 2020-02-19 RX ADMIN — ATORVASTATIN CALCIUM 10 MILLIGRAM(S): 80 TABLET, FILM COATED ORAL at 22:04

## 2020-02-19 RX ADMIN — CHLORHEXIDINE GLUCONATE 1 APPLICATION(S): 213 SOLUTION TOPICAL at 20:19

## 2020-02-19 RX ADMIN — CHLORHEXIDINE GLUCONATE 1 APPLICATION(S): 213 SOLUTION TOPICAL at 22:30

## 2020-02-19 NOTE — CONSULT NOTE ADULT - ATTENDING COMMENTS
Patient has been complaining of fatigue the last several months. Has complex calcified stenosis in bifurcating dual LAD system. 2 arterial grafts would be better than PCI. Explained all the risks to patient including death, stroke, bleeding, graft occlusion etc. He is agreeable. wants to proceed ASAP.

## 2020-02-19 NOTE — PRE-ANESTHESIA EVALUATION ADULT - NSANTHLABRESULTSFT_GEN_ALL_CORE
Cardiac Cath: FINDINGS  Left Heart Catheterization:  LVEF%: 45  LVEDP: mild elevation  LEFT HEART CATHETERIZATION                                  Left Main: normal  LAD: ostial LAD 90% tubular stenosis, mid LAD 99% tubular stenosis, distal LAD mild diffuse disease           Diag: D1 70% tubular stenosis in prox segment, rest of vessel with mild diffuse disease          Left Circumflex: mild diffuse disease  OM: OM1 very small vessel severe diffuse disease, OM2 mild disease  Right Coronary Artery: normal  RPDA: normal  RPL: normal  Ramus Intermed: ostial-prox RI severe stenosis      TTE: < from: Transthoracic Echocardiogram (12.12.19 @ 12:50) >  Summary:   1. Left ventricular ejection fraction, by visual estimation, is 55 to 60%.   2. Mild mitral valve regurgitation.    PHYSICIAN INTERPRETATION:  Left Ventricle: Normal left ventricular size and wall thicknesses, with normal systolic and diastolic function. Left ventricular ejection fraction, by visual estimation, is 55 to 60%.  Right Ventricle: Normal right ventricular size and function.  Left Atrium: Normal left atrial size.  Right Atrium: Normal right atrial size.  Pericardium: There is no evidence of pericardial effusion.  Mitral Valve: Structurally normal mitral valve, with normal leaflet excursion. The mitral valve is normal in structure. Mild mitral valve regurgitation is seen.  Tricuspid Valve: Structurally normal tricuspid valve, with normal leaflet excursion. The tricuspid valve is normal in structure. Mild tricuspid regurgitation is visualized.  Aortic Valve: Normal trileaflet aortic valve with normal opening. The aortic valve is normal. No evidence of aortic valve regurgitation is seen.  Pulmonic Valve: Structurally normal pulmonic valve, with normal leaflet excursion. The pulmonic valve is normal. Mild pulmonic valve regurgitation.  Aorta: The aortic root and ascending aorta are structurally normal, with no evidence of dilitation.  Pulmonary Artery: The main pulmonary artery is normal in size.

## 2020-02-19 NOTE — CONSULT NOTE ADULT - SUBJECTIVE AND OBJECTIVE BOX
Surgeon: Dr. Haas    Consult requesting by:    HISTORY OF PRESENT ILLNESS:  39y Male  HPI:  38 y/o M with PMH of Type I DM on insulin pump for 32 years, DLD presented for C after abnormal CT coronaries with multiple blockages     2/4/20 Calcium Score 1300.81  Severe stenosis >75% in prox, mid LAD, prox ramus intermedius  Moderate stenosis in distal circ and mod-severe stenosis in prox OM1  RPDA moderate stenosis (19 Feb 2020 09:34)      NYHA functional class    [ ] Class I (no limitation) [ ] Class II (slight limitation) [ ] Class III (marked limitation) [ ] Class IV (symptoms at rest)    PAST MEDICAL & SURGICAL HISTORY:  Hyperlipidemia  Diabetes, type I  History of trigger finger      MEDICATIONS  (STANDING):    MEDICATIONS  (PRN):    Antiplatelet therapy:                           Last dose/amt:    Allergies    No Known Allergies    Intolerances        SOCIAL HISTORY:  Smoker: [ ] Yes  [ ] No        PACK YEARS:                         WHEN QUIT?  ETOH use: [ ] Yes  [ ] No              FREQUENCY / QUANTITY:  Ilicit Drug use:  [ ] Yes  [ ] No  Occupation:  Lives with:  Assisted device use:  5 meter walk test: 1____sec, 2____sec, 3___sec  FAMILY HISTORY:  No pertinent family history in first degree relatives      Review of Systems  CONSTITUTIONAL:  Fevers[ ] chills[ ] sweats[ ] fatigue[ ] weight loss[ ] weight gain [ ]                                     NEGATIVE [X ]   NEURO:  paresthesias[ ] seizures [ ]  syncope [ ]  confusion [ ]                                                                                NEGATIVE[ X]   EYES: glasses[ ]  blurry vision[ ]  discharge[ ] pain[ ] glaucoma [ ]                                                                          NEGATIVE[X ]   ENMT:  difficulty hearing [ ]  vertigo[ ]  dysphagia[ ] epistaxis[ ] recent dental work [ ]                                    NEGATIVE[ X]   CV:  chest pain[ ] palpitations[ ] QUINTANA [ ] diaphoresis [ ]                                                                                           NEGATIVE[ X]   RESPIRATORY:  wheezing[ ] SOB[ ] cough [ ] sputum[ ] hemoptysis[ ]                                                                  NEGATIVE[ ]   GI:  nausea[ ]  vomiting [ ]  diarrhea[ ] constipation [ ] melena [ ]                                                                         NEGATIVE[ X]   : hematuria[ ]  dysuria[ ] urgency[ ] incontinence[ ]                                                                                            NEGATIVE[ X]   MUSKULOSKELETAL:  arthritis[ ]  joint swelling [ ] muscle weakness [ ] Hx vein stripping [ ]                             NEGATIVE[X ]   SKIN/BREAST:  rash[ ] itching [ ]  hair loss[ ] masses[ ]                                                                                              NEGATIVE[ X]   PSYCH:  dementia [ ] depression [ ] anxiety[ ]                                                                                                               NEGATIVE[X ]   HEME/LYMPH:  bruises easily[ ] enlarged lymph nodes[ ] tender lymph nodes[ ]                                               NEGATIVE[ X]   ENDOCRINE:  cold intolerance[ ] heat intolerance[ ] polydipsia[ ]                                                                          NEGATIVE[ X]     PHYSICAL EXAM  Vital Signs Last 24 Hrs  T(C): --  T(F): --  HR: --  BP: --  BP(mean): --  RR: --  SpO2: --  Right arm bp:                                 Left arm bp;    CONSTITUTIONAL:  WNL[ ]   Neuro: WNL [ ] Normal exam oriented to person/place & time with no focal motor or sensory  deficits. Other                     Eyes:    WNL [ ] Normal exam of conjunctiva & lids, pupils equally reactive. Other     ENT:     WNL [ ] Normal exam of nasal/oral mucosa with absence of cyanosis. Other  Neck:   WNL [ ] Normal exam of jugular veins, trachea & thyroid. Other  Chest:  WNL [ ] Normal lung exam with good air movement absence of wheezes, rales, or rhonchi: Other                                                                                CV:  Auscultation: normal [ ] S3[ ] S4[ ] Irregular [ ] Rub[ ] Clicks[ ]    Murmurs none:[ ]systolic [ ]  diastolic [ ] holosystolic [ ]  Carotids: No Bruits[ ] Other                 Abdominal Aorta: normal [ ] nonpalpable[ ]Other                                                                                      GI:           WNL[ ] Normal exam of abdomen, liver & spleen with no noted masses or tenderness. Other                                                                                                        Extremities: WNL[ ] Normal no evidence of cyanosis or deformity Edema: none[ ]trace[ ]1+[ ]2+[ ]3+[ ]4+[ ]  Lower Extremity Pulses: Right[ ] Left[ ]Varicosities[ ]  SKIN :WNL[ ] Normal exam to inspection & palation. Other:                                                          LABS:                        16.6   7.17  )-----------( 262      ( 19 Feb 2020 10:16 )             45.3     02-19    138  |  101  |  17  ----------------------------<  249<H>  5.0   |  25  |  0.8    Ca    9.6      19 Feb 2020 10:16                  Cardiac Cath:    TTE / GLORIA:    STS Score:     Impression:  CAD [ ]  Valvular  disease [ ]   Aortic Disease [ ]   LEANNE: Yes[ ] No [ ]   CKD Stage I [ ] , Stage II [ ] , Stage III [ ], Stage IV [ ]   Anemia: Yes [ ], No [ ]  Diabetes :Yes [ ], No [ ]  Acute MI: Yes [ ], No [ ]   Heart Failure: Yes [ ] , No [ ] HFpEF [ ], HFrEF [ ]      Assessment/ Plan: 39y Male with...  -Cases and plan discussed with CT surgeon /Siri/Alice. Initial STS risk assessed and discussed with patient. Evaluation by full heart team pending. Attending note to follow. Pre-op for:     Recommendations:  [] hold Plavix  [] hold ASA if Pre-op Cardiac Valve surgery and patient without CAD  [] hold ACEI/ARB/CCB 24 hours prior to planned procedure   [] LUE/RUE precaution for possible radial artery harvest      Labs:  [] CBC  [] CMP  [] PT/INR/PTT  [] BNP  [] HgA1c  [] Type and screen  [] Urinalysis    Diagnostic studies  [] CT HEAD Nonn-Contrast  [] CT Chest with /without contrast   [] Carotid Duplex  [] PFT: Simple PFT [ ]  Full [ ]  [] PHIL/PVR    Consultations/Evaluations   [] Renal Consult  [] Pulmonary Consult  [] Vascular Consult  [] Dental Consult   [] Hem-Onc Consult   [] GI Consult   [] Other Consultations : Surgeon: Dr. Haas    Consult requesting by: Cardiologist: Dr. Schmidt  PMD: Dr. Olivera    HISTORY OF PRESENT ILLNESS: 38 y/o M with PMH of Type I DM on insulin pump for 32 years and DLD. Patient had recent admission for bacterial PNA treated with oral antibiotics 12/2019.  Patient had ct chest on that admission which demonstrated coronary calcifications prompting LHC. Patient presented for elective LHC demonstrating severe 3vCAD to LAD, cirx and RPDA w/preserved ef.    NYHA functional class    [ x] Class I (no limitation) [ ] Class II (slight limitation) [ ] Class III (marked limitation) [ ] Class IV (symptoms at rest)      Home Medications:  aspirin 81 mg oral tablet: 1 tab(s) orally once a day (19 Feb 2020 09:42)  HumaLOG Cartridge 100 units/mL subcutaneous solution:  (19 Feb 2020 09:42)  Lipitor 10 mg oral tablet: 1 tab(s) orally once a day (19 Feb 2020 09:42)      Allergies  No Known Allergies  Intolerances      SOCIAL HISTORY:  Smoker: [ ] Yes  [x] No        PACK YEARS:                         WHEN QUIT?  ETOH use: [ ] Yes  [x] No              FREQUENCY / QUANTITY:  Illicit Drug use:  [ ] Yes  [x] No  Occupation:   Lives with: Wife and 3 children   Assisted device use:  5 meter walk test: 1__5__sec, 2__5__sec, 3_5__sec  FAMILY HISTORY:  No pertinent family history in first degree relatives      Review of Systems  CONSTITUTIONAL:  Fevers[ ] chills[ ] sweats[ ] fatigue[ ] weight loss[ ] weight gain [ ]                                     NEGATIVE [X ]   NEURO:  paresthesias[ ] seizures [ ]  syncope [ ]  confusion [ ]                                                                                NEGATIVE[ X]   EYES: glasses[ ]  blurry vision[ ]  discharge[ ] pain[ ] glaucoma [ ]                                                                          NEGATIVE[X ]   ENMT:  difficulty hearing [ ]  vertigo[ ]  dysphagia[ ] epistaxis[ ] recent dental work [ ]                                    NEGATIVE[ X]   CV:  chest pain[ ] palpitations[ ] QUINTANA [ ] diaphoresis [ ]                                                                                           NEGATIVE[ X]   RESPIRATORY:  wheezing[ ] SOB[ ] cough [x ] sputum[ ] hemoptysis[ ]                                                                    GI:  nausea[ ]  vomiting [ ]  diarrhea[ ] constipation [ ] melena [ ]                                                                         NEGATIVE[ X]   : hematuria[ ]  dysuria[ ] urgency[ ] incontinence[ ]                                                                                            NEGATIVE[ X]   MUSKULOSKELETAL:  arthritis[ ]  joint swelling [ ] muscle weakness [ ] Hx vein stripping [ ]                             NEGATIVE[X ]   SKIN/BREAST:  rash[ ] itching [ ]  hair loss[ ] masses[ ]                                                                                              NEGATIVE[ X]   PSYCH:  dementia [ ] depression [ ] anxiety[ ]                                                                                                               NEGATIVE[X ]   HEME/LYMPH:  bruises easily[ ] enlarged lymph nodes[ ] tender lymph nodes[ ]                                               NEGATIVE[ X]   ENDOCRINE:  cold intolerance[ ] heat intolerance[ ] polydipsia[ ]                                                                          NEGATIVE[ X]     PHYSICAL EXAM    CONSTITUTIONAL:  WNL[ x]   Neuro: WNL [x ] Normal exam oriented to person/place & time with no focal motor or sensory  deficits. Other                     Eyes:    WNL [ x] Normal exam of conjunctiva & lids, pupils equally reactive. Other     ENT:     WNL [x ] Normal exam of nasal/oral mucosa with absence of cyanosis. Other  Neck:   WNL [x ] Normal exam of jugular veins, trachea & thyroid. Other  Chest:  WNL [x ] Normal lung exam with good air movement absence of wheezes, rales, or rhonchi: Other                                                                                CV:  Auscultation: normal [x ] S3[ ] S4[ ] Irregular [ ] Rub[ ] Clicks[ ]    Murmurs none:[x ]systolic [ ]  diastolic [ ] holosystolic [ ]  Carotids: No Bruits[x ] Other                 Abdominal Aorta: normal [x ] nonpalpable[ ]Other                                                                                      GI: WNL[x ] Normal exam of abdomen, liver & spleen with no noted masses or tenderness. Other                                                                                                        Extremities: WNL[ x] Normal no evidence of cyanosis or deformity Edema: none[ ]trace[ ]1+[ ]2+[ ]3+[ ]4+[ ]  Lower Extremity Pulses: 1+ DP pulses b/l   SKIN: WNL[x] Normal exam to inspection & palpation. Other:                                                            LABS:                        16.6   7.17  )-----------( 262      ( 19 Feb 2020 10:16 )             45.3     02-19    138  |  101  |  17  ----------------------------<  249<H>  5.0   |  25  |  0.8    Ca    9.6      19 Feb 2020 10:16      Cardiac Cath: FINDINGS  Left Heart Catheterization:  LVEF%: 45  LVEDP: mild elevation  LEFT HEART CATHETERIZATION                                  Left Main: normal  LAD: ostial LAD 90% tubular stenosis, mid LAD 99% tubular stenosis, distal LAD mild diffuse disease           Diag: D1 70% tubular stenosis in prox segment, rest of vessel with mild diffuse disease          Left Circumflex: mild diffuse disease  OM: OM1 very small vessel severe diffuse disease, OM2 mild disease  Right Coronary Artery: normal  RPDA: normal  RPL: normal  Ramus Intermed: ostial-prox RI severe stenosis      TTE: < from: Transthoracic Echocardiogram (12.12.19 @ 12:50) >  Summary:   1. Left ventricular ejection fraction, by visual estimation, is 55 to 60%.   2. Mild mitral valve regurgitation.    PHYSICIAN INTERPRETATION:  Left Ventricle: Normal left ventricular size and wall thicknesses, with normal systolic and diastolic function. Left ventricular ejection fraction, by visual estimation, is 55 to 60%.  Right Ventricle: Normal right ventricular size and function.  Left Atrium: Normal left atrial size.  Right Atrium: Normal right atrial size.  Pericardium: There is no evidence of pericardial effusion.  Mitral Valve: Structurally normal mitral valve, with normal leaflet excursion. The mitral valve is normal in structure. Mild mitral valve regurgitation is seen.  Tricuspid Valve: Structurally normal tricuspid valve, with normal leaflet excursion. The tricuspid valve is normal in structure. Mild tricuspid regurgitation is visualized.  Aortic Valve: Normal trileaflet aortic valve with normal opening. The aortic valve is normal. No evidence of aortic valve regurgitation is seen.  Pulmonic Valve: Structurally normal pulmonic valve, with normal leaflet excursion. The pulmonic valve is normal. Mild pulmonic valve regurgitation.  Aorta: The aortic root and ascending aorta are structurally normal, with no evidence of dilitation.  Pulmonary Artery: The main pulmonary artery is normal in size.  < end of copied text >    CT chest: < from: CT Chest No Cont (12.09.19 @ 19:28) >  IMPRESSION:  Multi lobar patchy areas of nodular consolidation, consistent with pneumonia (involving right upper lobe, right middle lobe, both lower lobes).  Trace bilateral pleural effusions, slightly larger on the right.  Likely reactive 1.1 cm right subcarinal lymph node.  Extensive coronary artery calcifications, consistent with coronary artery disease.  < end of copied text >      STS Score: Isolated CAB  Risk of Mortality: 0.282%  Renal Failure: 0.272%  Permanent Stroke: 0.389%  Prolonged Ventilation: 1.601%  DSW Infection: 0.162%  Reoperation: 1.574%  Morbidity or Mortality: 2.965%  Short Length of Stay: 81.360%  Long Length of Stay: 0.826%    Impression:  CAD [ x]  Valvular  disease [ ]   Aortic Disease [ ]   LEANNE: Yes[ ] No [ ]   CKD Stage I [ ] , Stage II [ ] , Stage III [ ], Stage IV [ ]   Anemia: Yes [ ], No [ ]  Diabetes :Yes [x ], No [ ]  Acute MI: Yes [ ], No [ ]   Heart Failure: Yes [ ] , No [ ] HFpEF [ ], HFrEF [ ]      Assessment/ Plan: 39 M w/PMH: Type I DM on insulin pump for 32 years and DLD w/recent bacterial PNA treated with oral antibiotics 12/2019.  Patient w/coronary calcifications on CT chest. Patient presented for elective LHC demonstrating severe CAD to LAD, cirx and RPDA w/preserved ef.    -Case and plan discussed with CT surgeon Dr. Haas. Initial STS risk assessed and discussed with patient. Evaluation by full heart team pending. Attending note to follow. Pre-op for: CABG  - Admit to telemetry under CTS.  - Pre-operative testing in progress.    Recommendations:  [] hold Plavix  [] hold ASA if Pre-op Cardiac Valve surgery and patient without CAD  [] hold ACEI/ARB/CCB 24 hours prior to planned procedure   [x] LUE precaution for possible radial artery harvest      Labs:  [x] CBC  [x] CMP  [x] PT/INR/PTT  [x] BNP  [x] HgA1c  [x] Type and screen  [x] Urinalysis  [x] MRSA    Diagnostic studies  [] CT HEAD Non-Contrast  [] CT Chest without contrast   [x] Carotid Duplex  [x] PFT: Simple PFT [x ]  Full [ ]  [] PHIL/PVR    Consultations/Evaluations   [] Renal Consult  [] Pulmonary Consult  [] Vascular Consult  [] Dental Consult   [] Hem-Onc Consult   [] GI Consult   [] Other Consultations :

## 2020-02-19 NOTE — CHART NOTE - NSCHARTNOTEFT_GEN_A_CORE
PRE-OP DIAGNOSIS: suspected CAD    PROCEDURE: [facility] with coronary angiography    Physician: Dr. Rizo  Assistant: Saskia Antunez    ANESTHESIA TYPE:  [  ] General Anesthesia  [x] Sedation  [x] Local/Regional    ESTIMATED BLOOD LOSS:     10 mL    CONDITION  [  ] Critical  [  ] Serious  [x] Fair  [  ] Good      SPECIMENS REMOVED (IF APPLICABLE): N/A      IV CONTRAST:   60  mL      IMPLANTS (IF APPLICABLE)      FINDINGS    Left Heart Catheterization:  LVEF%: 45  LVEDP: mild elevation    LEFT HEART CATHETERIZATION                                    Left Main: normal    LAD: ostial LAD 90% tubular stenosis, mid LAD 99% tubular stenosis, distal LAD mild diffuse disease           Diag: D1 70% tubular stenosis in prox segment, rest of vessel with mild diffuse disease                Left Circumflex: mild diffuse disease  OM: OM1 very small vessel severe diffuse disease, OM2 mild disease    Right Coronary Artery: normal  RPDA: normal  RPL: normal    Ramus Intermed: ostial-prox RI severe stenosis    DOMINANCE: Right    ACCESS: attempted right radial artery, final access through right femoral artery  CLOSURE: D-stat for right radial artery, Manual hold for right femoral artery    INTERVENTION  IMPLANTS: None    POST-OP DIAGNOSIS  Significant 1 vessel disease (LAD)    PLAN OF CARE  [x] CT Surgery consult called  [x] Continue aspirin & Statin therapy. Increase statin. Recommend starting beta-blocker.

## 2020-02-19 NOTE — H&P CARDIOLOGY - HISTORY OF PRESENT ILLNESS
38 y/o M with PMH of Type I DM on insulin pump for 32 years, DLD presented for C after abnormal CT coronaries with multiple blockages     2/4/20 Calcium Score 1300.81  Severe stenosis >75% in prox, mid LAD, prox ramus intermedius  Moderate stenosis in distal circ and mod-severe stenosis in prox OM1  RPDA moderate stenosis

## 2020-02-19 NOTE — PRE-ANESTHESIA EVALUATION ADULT - NSANTHPMHFT_GEN_ALL_CORE
REVIEWED   PMH of Type I DM on insulin pump for 32 years and DLD. Patient had recent admission for bacterial PNA treated with oral antibiotics 12/2019.  Patient had ct chest on that admission which demonstrated coronary calcifications prompting LHC. Patient presented for elective LHC demonstrating severe 3vCAD to LAD, cirx and RPDA w/preserved ef.

## 2020-02-19 NOTE — PATIENT PROFILE ADULT - SURGICAL SITE DESCRIPTION
Right wrist Cardiac cath site w/ dressing clean dry and intact. Right groin Cardiac Cath site with dressing clean dry and intact.

## 2020-02-19 NOTE — PRE-ANESTHESIA EVALUATION ADULT - NSANTHOSAYNRD_GEN_A_CORE
No. ARACELIS screening performed.  STOP BANG Legend: 0-2 = LOW Risk; 3-4 = INTERMEDIATE Risk; 5-8 = HIGH Risk

## 2020-02-20 ENCOUNTER — RESULT REVIEW (OUTPATIENT)
Age: 40
End: 2020-02-20

## 2020-02-20 DIAGNOSIS — I25.10 ATHEROSCLEROTIC HEART DISEASE OF NATIVE CORONARY ARTERY WITHOUT ANGINA PECTORIS: ICD-10-CM

## 2020-02-20 LAB
ABO RH CONFIRMATION: SIGNIFICANT CHANGE UP
ALBUMIN SERPL ELPH-MCNC: 4.5 G/DL — SIGNIFICANT CHANGE UP (ref 3.5–5.2)
ALP SERPL-CCNC: 49 U/L — SIGNIFICANT CHANGE UP (ref 30–115)
ALT FLD-CCNC: 13 U/L — SIGNIFICANT CHANGE UP (ref 0–41)
ANION GAP SERPL CALC-SCNC: 10 MMOL/L — SIGNIFICANT CHANGE UP (ref 7–14)
ANION GAP SERPL CALC-SCNC: 13 MMOL/L — SIGNIFICANT CHANGE UP (ref 7–14)
APPEARANCE UR: CLEAR — SIGNIFICANT CHANGE UP
APTT BLD: 26.9 SEC — LOW (ref 27–39.2)
APTT BLD: 31.7 SEC — SIGNIFICANT CHANGE UP (ref 27–39.2)
AST SERPL-CCNC: 20 U/L — SIGNIFICANT CHANGE UP (ref 0–41)
BASE EXCESS BLDA CALC-SCNC: -0.3 MMOL/L — SIGNIFICANT CHANGE UP (ref -2–2)
BASE EXCESS BLDA CALC-SCNC: -0.7 MMOL/L — SIGNIFICANT CHANGE UP (ref -2–2)
BASOPHILS # BLD AUTO: 0.06 K/UL — SIGNIFICANT CHANGE UP (ref 0–0.2)
BASOPHILS # BLD AUTO: 0.07 K/UL — SIGNIFICANT CHANGE UP (ref 0–0.2)
BASOPHILS NFR BLD AUTO: 0.5 % — SIGNIFICANT CHANGE UP (ref 0–1)
BASOPHILS NFR BLD AUTO: 0.6 % — SIGNIFICANT CHANGE UP (ref 0–1)
BILIRUB SERPL-MCNC: 0.9 MG/DL — SIGNIFICANT CHANGE UP (ref 0.2–1.2)
BILIRUB UR-MCNC: NEGATIVE — SIGNIFICANT CHANGE UP
BLD GP AB SCN SERPL QL: SIGNIFICANT CHANGE UP
BUN SERPL-MCNC: 12 MG/DL — SIGNIFICANT CHANGE UP (ref 10–20)
BUN SERPL-MCNC: 18 MG/DL — SIGNIFICANT CHANGE UP (ref 10–20)
CALCIUM SERPL-MCNC: 9.1 MG/DL — SIGNIFICANT CHANGE UP (ref 8.5–10.1)
CALCIUM SERPL-MCNC: 9.1 MG/DL — SIGNIFICANT CHANGE UP (ref 8.5–10.1)
CHLORIDE SERPL-SCNC: 103 MMOL/L — SIGNIFICANT CHANGE UP (ref 98–110)
CHLORIDE SERPL-SCNC: 99 MMOL/L — SIGNIFICANT CHANGE UP (ref 98–110)
CO2 SERPL-SCNC: 22 MMOL/L — SIGNIFICANT CHANGE UP (ref 17–32)
CO2 SERPL-SCNC: 26 MMOL/L — SIGNIFICANT CHANGE UP (ref 17–32)
COLOR SPEC: SIGNIFICANT CHANGE UP
CREAT SERPL-MCNC: 0.9 MG/DL — SIGNIFICANT CHANGE UP (ref 0.7–1.5)
CREAT SERPL-MCNC: 0.9 MG/DL — SIGNIFICANT CHANGE UP (ref 0.7–1.5)
DIFF PNL FLD: NEGATIVE — SIGNIFICANT CHANGE UP
EOSINOPHIL # BLD AUTO: 0.14 K/UL — SIGNIFICANT CHANGE UP (ref 0–0.7)
EOSINOPHIL # BLD AUTO: 0.17 K/UL — SIGNIFICANT CHANGE UP (ref 0–0.7)
EOSINOPHIL NFR BLD AUTO: 1.2 % — SIGNIFICANT CHANGE UP (ref 0–8)
EOSINOPHIL NFR BLD AUTO: 1.5 % — SIGNIFICANT CHANGE UP (ref 0–8)
GAS PNL BLDA: SIGNIFICANT CHANGE UP
GAS PNL BLDA: SIGNIFICANT CHANGE UP
GLUCOSE BLDC GLUCOMTR-MCNC: 131 MG/DL — HIGH (ref 70–99)
GLUCOSE BLDC GLUCOMTR-MCNC: 131 MG/DL — HIGH (ref 70–99)
GLUCOSE BLDC GLUCOMTR-MCNC: 144 MG/DL — HIGH (ref 70–99)
GLUCOSE BLDC GLUCOMTR-MCNC: 177 MG/DL — HIGH (ref 70–99)
GLUCOSE BLDC GLUCOMTR-MCNC: 195 MG/DL — HIGH (ref 70–99)
GLUCOSE BLDC GLUCOMTR-MCNC: 199 MG/DL — HIGH (ref 70–99)
GLUCOSE BLDC GLUCOMTR-MCNC: 233 MG/DL — HIGH (ref 70–99)
GLUCOSE BLDC GLUCOMTR-MCNC: 99 MG/DL — SIGNIFICANT CHANGE UP (ref 70–99)
GLUCOSE SERPL-MCNC: 125 MG/DL — HIGH (ref 70–99)
GLUCOSE SERPL-MCNC: 252 MG/DL — HIGH (ref 70–99)
GLUCOSE UR QL: ABNORMAL
HCO3 BLDA-SCNC: 24 MMOL/L — SIGNIFICANT CHANGE UP (ref 23–27)
HCO3 BLDA-SCNC: 25 MMOL/L — SIGNIFICANT CHANGE UP (ref 23–27)
HCT VFR BLD CALC: 31.8 % — LOW (ref 42–52)
HCT VFR BLD CALC: 34.9 % — LOW (ref 42–52)
HGB BLD-MCNC: 11.5 G/DL — LOW (ref 14–18)
HGB BLD-MCNC: 12.4 G/DL — LOW (ref 14–18)
IMM GRANULOCYTES NFR BLD AUTO: 0.4 % — HIGH (ref 0.1–0.3)
IMM GRANULOCYTES NFR BLD AUTO: 0.7 % — HIGH (ref 0.1–0.3)
INR BLD: 1.04 RATIO — SIGNIFICANT CHANGE UP (ref 0.65–1.3)
INR BLD: 1.29 RATIO — SIGNIFICANT CHANGE UP (ref 0.65–1.3)
KETONES UR-MCNC: NEGATIVE — SIGNIFICANT CHANGE UP
LEUKOCYTE ESTERASE UR-ACNC: NEGATIVE — SIGNIFICANT CHANGE UP
LYMPHOCYTES # BLD AUTO: 2.27 K/UL — SIGNIFICANT CHANGE UP (ref 1.2–3.4)
LYMPHOCYTES # BLD AUTO: 20.1 % — LOW (ref 20.5–51.1)
LYMPHOCYTES # BLD AUTO: 26.9 % — SIGNIFICANT CHANGE UP (ref 20.5–51.1)
LYMPHOCYTES # BLD AUTO: 3.15 K/UL — SIGNIFICANT CHANGE UP (ref 1.2–3.4)
MAGNESIUM SERPL-MCNC: 2.9 MG/DL — HIGH (ref 1.8–2.4)
MCHC RBC-ENTMCNC: 29.2 PG — SIGNIFICANT CHANGE UP (ref 27–31)
MCHC RBC-ENTMCNC: 29.6 PG — SIGNIFICANT CHANGE UP (ref 27–31)
MCHC RBC-ENTMCNC: 35.5 G/DL — SIGNIFICANT CHANGE UP (ref 32–37)
MCHC RBC-ENTMCNC: 36.2 G/DL — SIGNIFICANT CHANGE UP (ref 32–37)
MCV RBC AUTO: 82 FL — SIGNIFICANT CHANGE UP (ref 80–94)
MCV RBC AUTO: 82.3 FL — SIGNIFICANT CHANGE UP (ref 80–94)
MONOCYTES # BLD AUTO: 0.91 K/UL — HIGH (ref 0.1–0.6)
MONOCYTES # BLD AUTO: 1.12 K/UL — HIGH (ref 0.1–0.6)
MONOCYTES NFR BLD AUTO: 7.8 % — SIGNIFICANT CHANGE UP (ref 1.7–9.3)
MONOCYTES NFR BLD AUTO: 9.9 % — HIGH (ref 1.7–9.3)
NEUTROPHILS # BLD AUTO: 7.34 K/UL — HIGH (ref 1.4–6.5)
NEUTROPHILS # BLD AUTO: 7.63 K/UL — HIGH (ref 1.4–6.5)
NEUTROPHILS NFR BLD AUTO: 62.8 % — SIGNIFICANT CHANGE UP (ref 42.2–75.2)
NEUTROPHILS NFR BLD AUTO: 67.6 % — SIGNIFICANT CHANGE UP (ref 42.2–75.2)
NITRITE UR-MCNC: NEGATIVE — SIGNIFICANT CHANGE UP
NRBC # BLD: 0 /100 WBCS — SIGNIFICANT CHANGE UP (ref 0–0)
NRBC # BLD: 0 /100 WBCS — SIGNIFICANT CHANGE UP (ref 0–0)
PCO2 BLDA: 35 MMHG — LOW (ref 38–42)
PCO2 BLDA: 44 MMHG — HIGH (ref 38–42)
PH BLDA: 7.36 — LOW (ref 7.38–7.42)
PH BLDA: 7.43 — HIGH (ref 7.38–7.42)
PH UR: 6.5 — SIGNIFICANT CHANGE UP (ref 5–8)
PLATELET # BLD AUTO: 123 K/UL — LOW (ref 130–400)
PLATELET # BLD AUTO: 191 K/UL — SIGNIFICANT CHANGE UP (ref 130–400)
PO2 BLDA: 138 MMHG — HIGH (ref 78–95)
PO2 BLDA: 166 MMHG — HIGH (ref 78–95)
POTASSIUM SERPL-MCNC: 4.3 MMOL/L — SIGNIFICANT CHANGE UP (ref 3.5–5)
POTASSIUM SERPL-MCNC: 4.4 MMOL/L — SIGNIFICANT CHANGE UP (ref 3.5–5)
POTASSIUM SERPL-SCNC: 4.3 MMOL/L — SIGNIFICANT CHANGE UP (ref 3.5–5)
POTASSIUM SERPL-SCNC: 4.4 MMOL/L — SIGNIFICANT CHANGE UP (ref 3.5–5)
PROT SERPL-MCNC: 6.2 G/DL — SIGNIFICANT CHANGE UP (ref 6–8)
PROT UR-MCNC: NEGATIVE — SIGNIFICANT CHANGE UP
PROTHROM AB SERPL-ACNC: 12 SEC — SIGNIFICANT CHANGE UP (ref 9.95–12.87)
PROTHROM AB SERPL-ACNC: 14.8 SEC — HIGH (ref 9.95–12.87)
RBC # BLD: 3.88 M/UL — LOW (ref 4.7–6.1)
RBC # BLD: 4.24 M/UL — LOW (ref 4.7–6.1)
RBC # FLD: 12.6 % — SIGNIFICANT CHANGE UP (ref 11.5–14.5)
RBC # FLD: 12.8 % — SIGNIFICANT CHANGE UP (ref 11.5–14.5)
SAO2 % BLDA: 100 % — HIGH (ref 94–98)
SAO2 % BLDA: 100 % — HIGH (ref 94–98)
SODIUM SERPL-SCNC: 135 MMOL/L — SIGNIFICANT CHANGE UP (ref 135–146)
SODIUM SERPL-SCNC: 138 MMOL/L — SIGNIFICANT CHANGE UP (ref 135–146)
SP GR SPEC: 1.03 — HIGH (ref 1.01–1.02)
UROBILINOGEN FLD QL: SIGNIFICANT CHANGE UP
WBC # BLD: 11.3 K/UL — HIGH (ref 4.8–10.8)
WBC # BLD: 11.69 K/UL — HIGH (ref 4.8–10.8)
WBC # FLD AUTO: 11.3 K/UL — HIGH (ref 4.8–10.8)
WBC # FLD AUTO: 11.69 K/UL — HIGH (ref 4.8–10.8)

## 2020-02-20 PROCEDURE — 33518 CABG ARTERY-VEIN TWO: CPT | Mod: AS

## 2020-02-20 PROCEDURE — 33508 ENDOSCOPIC VEIN HARVEST: CPT | Mod: AS

## 2020-02-20 PROCEDURE — 36620 INSERTION CATHETER ARTERY: CPT

## 2020-02-20 PROCEDURE — 88307 TISSUE EXAM BY PATHOLOGIST: CPT | Mod: 26

## 2020-02-20 PROCEDURE — 93010 ELECTROCARDIOGRAM REPORT: CPT

## 2020-02-20 PROCEDURE — 71045 X-RAY EXAM CHEST 1 VIEW: CPT | Mod: 26,77

## 2020-02-20 PROCEDURE — 33533 CABG ARTERIAL SINGLE: CPT | Mod: AS

## 2020-02-20 PROCEDURE — 33518 CABG ARTERY-VEIN TWO: CPT

## 2020-02-20 PROCEDURE — 33508 ENDOSCOPIC VEIN HARVEST: CPT

## 2020-02-20 PROCEDURE — 33533 CABG ARTERIAL SINGLE: CPT

## 2020-02-20 PROCEDURE — 93010 ELECTROCARDIOGRAM REPORT: CPT | Mod: 77

## 2020-02-20 PROCEDURE — 71045 X-RAY EXAM CHEST 1 VIEW: CPT | Mod: 26

## 2020-02-20 PROCEDURE — 99252 IP/OBS CONSLTJ NEW/EST SF 35: CPT

## 2020-02-20 RX ORDER — CHLORHEXIDINE GLUCONATE 213 G/1000ML
15 SOLUTION TOPICAL EVERY 12 HOURS
Refills: 0 | Status: DISCONTINUED | OUTPATIENT
Start: 2020-02-20 | End: 2020-02-21

## 2020-02-20 RX ORDER — DEXMEDETOMIDINE HYDROCHLORIDE IN 0.9% SODIUM CHLORIDE 4 UG/ML
0.25 INJECTION INTRAVENOUS
Qty: 200 | Refills: 0 | Status: DISCONTINUED | OUTPATIENT
Start: 2020-02-20 | End: 2020-02-21

## 2020-02-20 RX ORDER — POLYETHYLENE GLYCOL 3350 17 G/17G
17 POWDER, FOR SOLUTION ORAL DAILY
Refills: 0 | Status: DISCONTINUED | OUTPATIENT
Start: 2020-02-20 | End: 2020-02-24

## 2020-02-20 RX ORDER — FAMOTIDINE 10 MG/ML
20 INJECTION INTRAVENOUS EVERY 12 HOURS
Refills: 0 | Status: DISCONTINUED | OUTPATIENT
Start: 2020-02-20 | End: 2020-02-21

## 2020-02-20 RX ORDER — ONDANSETRON 8 MG/1
4 TABLET, FILM COATED ORAL ONCE
Refills: 0 | Status: COMPLETED | OUTPATIENT
Start: 2020-02-20 | End: 2020-02-21

## 2020-02-20 RX ORDER — ASPIRIN/CALCIUM CARB/MAGNESIUM 324 MG
300 TABLET ORAL ONCE
Refills: 0 | Status: DISCONTINUED | OUTPATIENT
Start: 2020-02-20 | End: 2020-02-21

## 2020-02-20 RX ORDER — CEFAZOLIN SODIUM 1 G
1000 VIAL (EA) INJECTION EVERY 8 HOURS
Refills: 0 | Status: COMPLETED | OUTPATIENT
Start: 2020-02-20 | End: 2020-02-21

## 2020-02-20 RX ORDER — PROPOFOL 10 MG/ML
10 INJECTION, EMULSION INTRAVENOUS
Qty: 1000 | Refills: 0 | Status: DISCONTINUED | OUTPATIENT
Start: 2020-02-20 | End: 2020-02-21

## 2020-02-20 RX ORDER — OXYCODONE HYDROCHLORIDE 5 MG/1
10 TABLET ORAL EVERY 4 HOURS
Refills: 0 | Status: DISCONTINUED | OUTPATIENT
Start: 2020-02-20 | End: 2020-02-24

## 2020-02-20 RX ORDER — DEXTROSE 50 % IN WATER 50 %
50 SYRINGE (ML) INTRAVENOUS
Refills: 0 | Status: DISCONTINUED | OUTPATIENT
Start: 2020-02-20 | End: 2020-02-24

## 2020-02-20 RX ORDER — ALBUMIN HUMAN 25 %
500 VIAL (ML) INTRAVENOUS ONCE
Refills: 0 | Status: COMPLETED | OUTPATIENT
Start: 2020-02-20 | End: 2020-02-20

## 2020-02-20 RX ORDER — CHLORHEXIDINE GLUCONATE 213 G/1000ML
1 SOLUTION TOPICAL
Refills: 0 | Status: DISCONTINUED | OUTPATIENT
Start: 2020-02-20 | End: 2020-02-24

## 2020-02-20 RX ORDER — ACETAMINOPHEN 500 MG
650 TABLET ORAL ONCE
Refills: 0 | Status: COMPLETED | OUTPATIENT
Start: 2020-02-20 | End: 2020-02-20

## 2020-02-20 RX ORDER — DEXTROSE 50 % IN WATER 50 %
25 SYRINGE (ML) INTRAVENOUS
Refills: 0 | Status: DISCONTINUED | OUTPATIENT
Start: 2020-02-20 | End: 2020-02-24

## 2020-02-20 RX ORDER — MAGNESIUM SULFATE 500 MG/ML
1 VIAL (ML) INJECTION EVERY 12 HOURS
Refills: 0 | Status: DISCONTINUED | OUTPATIENT
Start: 2020-02-20 | End: 2020-02-24

## 2020-02-20 RX ORDER — NOREPINEPHRINE BITARTRATE/D5W 8 MG/250ML
0.05 PLASTIC BAG, INJECTION (ML) INTRAVENOUS
Qty: 8 | Refills: 0 | Status: DISCONTINUED | OUTPATIENT
Start: 2020-02-20 | End: 2020-02-21

## 2020-02-20 RX ORDER — INSULIN HUMAN 100 [IU]/ML
1 INJECTION, SOLUTION SUBCUTANEOUS
Qty: 100 | Refills: 0 | Status: DISCONTINUED | OUTPATIENT
Start: 2020-02-20 | End: 2020-02-23

## 2020-02-20 RX ORDER — FENTANYL CITRATE 50 UG/ML
25 INJECTION INTRAVENOUS ONCE
Refills: 0 | Status: DISCONTINUED | OUTPATIENT
Start: 2020-02-20 | End: 2020-02-20

## 2020-02-20 RX ORDER — NITROGLYCERIN 6.5 MG
5 CAPSULE, EXTENDED RELEASE ORAL
Qty: 50 | Refills: 0 | Status: DISCONTINUED | OUTPATIENT
Start: 2020-02-20 | End: 2020-02-23

## 2020-02-20 RX ORDER — METOPROLOL TARTRATE 50 MG
0.5 TABLET ORAL ONCE
Refills: 0 | Status: COMPLETED | OUTPATIENT
Start: 2020-02-20 | End: 2020-02-20

## 2020-02-20 RX ORDER — CHLORHEXIDINE GLUCONATE 213 G/1000ML
5 SOLUTION TOPICAL EVERY 4 HOURS
Refills: 0 | Status: DISCONTINUED | OUTPATIENT
Start: 2020-02-20 | End: 2020-02-21

## 2020-02-20 RX ORDER — SODIUM CHLORIDE 9 MG/ML
1000 INJECTION INTRAMUSCULAR; INTRAVENOUS; SUBCUTANEOUS
Refills: 0 | Status: DISCONTINUED | OUTPATIENT
Start: 2020-02-20 | End: 2020-02-24

## 2020-02-20 RX ORDER — ALBUMIN HUMAN 25 %
1000 VIAL (ML) INTRAVENOUS ONCE
Refills: 0 | Status: COMPLETED | OUTPATIENT
Start: 2020-02-20 | End: 2020-02-20

## 2020-02-20 RX ORDER — NICARDIPINE HYDROCHLORIDE 30 MG/1
5 CAPSULE, EXTENDED RELEASE ORAL
Qty: 40 | Refills: 0 | Status: DISCONTINUED | OUTPATIENT
Start: 2020-02-20 | End: 2020-02-23

## 2020-02-20 RX ORDER — MEPERIDINE HYDROCHLORIDE 50 MG/ML
25 INJECTION INTRAMUSCULAR; INTRAVENOUS; SUBCUTANEOUS ONCE
Refills: 0 | Status: DISCONTINUED | OUTPATIENT
Start: 2020-02-20 | End: 2020-02-24

## 2020-02-20 RX ORDER — MEPERIDINE HYDROCHLORIDE 50 MG/ML
25 INJECTION INTRAMUSCULAR; INTRAVENOUS; SUBCUTANEOUS ONCE
Refills: 0 | Status: DISCONTINUED | OUTPATIENT
Start: 2020-02-20 | End: 2020-02-20

## 2020-02-20 RX ORDER — OXYCODONE HYDROCHLORIDE 5 MG/1
5 TABLET ORAL EVERY 4 HOURS
Refills: 0 | Status: DISCONTINUED | OUTPATIENT
Start: 2020-02-20 | End: 2020-02-21

## 2020-02-20 RX ORDER — ASPIRIN/CALCIUM CARB/MAGNESIUM 324 MG
325 TABLET ORAL DAILY
Refills: 0 | Status: DISCONTINUED | OUTPATIENT
Start: 2020-02-21 | End: 2020-02-24

## 2020-02-20 RX ADMIN — Medication 12.5 MILLIGRAM(S): at 21:24

## 2020-02-20 RX ADMIN — CHLORHEXIDINE GLUCONATE 15 MILLILITER(S): 213 SOLUTION TOPICAL at 18:20

## 2020-02-20 RX ADMIN — MEPERIDINE HYDROCHLORIDE 25 MILLIGRAM(S): 50 INJECTION INTRAMUSCULAR; INTRAVENOUS; SUBCUTANEOUS at 16:00

## 2020-02-20 RX ADMIN — ATORVASTATIN CALCIUM 10 MILLIGRAM(S): 80 TABLET, FILM COATED ORAL at 22:01

## 2020-02-20 RX ADMIN — OXYCODONE HYDROCHLORIDE 10 MILLIGRAM(S): 5 TABLET ORAL at 02:10

## 2020-02-20 RX ADMIN — Medication 250 MILLILITER(S): at 22:48

## 2020-02-20 RX ADMIN — FENTANYL CITRATE 25 MICROGRAM(S): 50 INJECTION INTRAVENOUS at 19:45

## 2020-02-20 RX ADMIN — CHLORHEXIDINE GLUCONATE 5 MILLILITER(S): 213 SOLUTION TOPICAL at 21:29

## 2020-02-20 RX ADMIN — Medication 500 MILLILITER(S): at 16:00

## 2020-02-20 RX ADMIN — FENTANYL CITRATE 25 MICROGRAM(S): 50 INJECTION INTRAVENOUS at 19:30

## 2020-02-20 RX ADMIN — FAMOTIDINE 20 MILLIGRAM(S): 10 INJECTION INTRAVENOUS at 18:18

## 2020-02-20 RX ADMIN — Medication 100 GRAM(S): at 18:19

## 2020-02-20 RX ADMIN — Medication 650 MILLIGRAM(S): at 22:48

## 2020-02-20 RX ADMIN — Medication 0.5 MILLIGRAM(S): at 06:30

## 2020-02-20 RX ADMIN — CHLORHEXIDINE GLUCONATE 5 MILLILITER(S): 213 SOLUTION TOPICAL at 18:18

## 2020-02-20 RX ADMIN — MEPERIDINE HYDROCHLORIDE 25 MILLIGRAM(S): 50 INJECTION INTRAMUSCULAR; INTRAVENOUS; SUBCUTANEOUS at 16:15

## 2020-02-20 RX ADMIN — PROPOFOL 5.13 MICROGRAM(S)/KG/MIN: 10 INJECTION, EMULSION INTRAVENOUS at 15:50

## 2020-02-20 RX ADMIN — Medication 100 MILLIGRAM(S): at 21:30

## 2020-02-20 RX ADMIN — OXYCODONE HYDROCHLORIDE 10 MILLIGRAM(S): 5 TABLET ORAL at 20:30

## 2020-02-20 RX ADMIN — FENTANYL CITRATE 25 MICROGRAM(S): 50 INJECTION INTRAVENOUS at 23:30

## 2020-02-20 RX ADMIN — Medication 650 MILLIGRAM(S): at 23:18

## 2020-02-20 NOTE — PROGRESS NOTE ADULT - SUBJECTIVE AND OBJECTIVE BOX
SUBJECTIVE ASSESSMENT:  pt has no major complaints    Vital Signs Last 24 Hrs  T(C): 36.6 (2020 08:01), Max: 36.6 (2020 00:31)  T(F): 97.9 (2020 08:01), Max: 97.9 (2020 00:31)  HR: 88 (2020 08:01) (61 - 88)  BP: 135/88 (2020 08:01) (131/85 - 144/85)  BP(mean): 104 (2020 06:21) (104 - 107)  RR: 20 (2020 08:01) (16 - 21)  SpO2: 100% (2020 08:01) (99% - 100%)    A&OX3 in NAD  CTA bilat  nl s1, s2  abd soft/NT/ND  no peripheral edema      LABS:                        15.1   6.93  )-----------( 251      ( 2020 13:22 )             41.2   PT/INR - ( 2020 01:00 )   PT: 12.00 sec;   INR: 1.04 ratio      PTT - ( 2020 01:00 )  PTT:31.7 sec  02-20    135  |  99  |  18  ----------------------------<  252<H>  4.3   |  26  |  0.9    Ca    9.1      2020 01:00  Mg     1.8     02-    TPro  6.6  /  Alb  4.1  /  TBili  0.4  /  DBili  x   /  AST  15  /  ALT  17  /  AlkPhos  72  02-19    Urinalysis Basic - ( 2020 01:00 )    Color: Light Yellow / Appearance: Clear / S.027 / pH: x  Gluc: x / Ketone: Negative  / Bili: Negative / Urobili: <2 mg/dL   Blood: x / Protein: Negative / Nitrite: Negative   Leuk Esterase: Negative / RBC: x / WBC x   Sq Epi: x / Non Sq Epi: x / Bacteria: x    MEDICATIONS  (STANDING):  aspirin enteric coated 81 milliGRAM(s) Oral daily  atorvastatin 10 milliGRAM(s) Oral at bedtime  insulin lispro (HumaLOG) corrective regimen sliding scale   SubCutaneous three times a day before meals  insulin regular human (HumuLIN R U-100) Pump 1 Each SubCutaneous Continuous Pump  metoprolol tartrate 12.5 milliGRAM(s) Oral once  pantoprazole    Tablet 40 milliGRAM(s) Oral before breakfast    MEDICATIONS  (PRN):  dextrose 40% Gel 15 Gram(s) Oral once PRN Blood Glucose LESS THAN 70 milliGRAM(s)/deciliter  glucagon  Injectable 1 milliGRAM(s) IntraMuscular once PRN Glucose LESS THAN 70 milligrams/deciliter

## 2020-02-20 NOTE — PROGRESS NOTE ADULT - ATTENDING COMMENTS
40 y/o M with PMH of Type I DM on insulin pump for 32 years and DLD. Patient had recent admission for PNA treated with oral antibiotics 12/2019.  Patient had ct chest on that admission which demonstrated coronary calcifications prompting LHC. Patient presented for elective LHC demonstrating severe CAD w/preserved EF  GIven pt's history of diabetes, and young age surgical revascularization was recommended  preop w/u was reviewed  pt for surgery today  risks and benefits of surgery were explained to the patient and his family  case d/w Dr. Haas, Dr. Limon, Dr. Alonzo    preop w/u summary  LHC performed on 02/19/2020 revealed LVEF of 45%, ostial LAD 90% tubular stenosis, mid LAD 99% tubular stenosis, distal LAD mild diffuse disease, D1 70% tubular stenosis in prox segment, rest of vessel with mild diffuse disease, Ramus Intermed: ostial-prox RI severe stenosis    TTE performed on 12/12/2019 revealed Left ventricular ejection fraction, by visual estimation, is 55 to 60%, mild mitral valve regurgitation, mild TR    CT chest without contrast performed on 12/09/2019 revealed multi lobar patchy areas of nodular consolidation, consistent with pneumonia (involving right upper lobe, right middle lobe, both lower lobes). Trace bilateral pleural effusions, slightly larger on the right. Likely reactive 1.1 cm right subcarinal lymph node. Extensive coronary artery calcifications, consistent with coronary artery disease.

## 2020-02-21 ENCOUNTER — TRANSCRIPTION ENCOUNTER (OUTPATIENT)
Age: 40
End: 2020-02-21

## 2020-02-21 ENCOUNTER — NON-APPOINTMENT (OUTPATIENT)
Age: 40
End: 2020-02-21

## 2020-02-21 LAB
ALBUMIN SERPL ELPH-MCNC: 4.6 G/DL — SIGNIFICANT CHANGE UP (ref 3.5–5.2)
ALBUMIN SERPL ELPH-MCNC: 4.8 G/DL — SIGNIFICANT CHANGE UP (ref 3.5–5.2)
ALP SERPL-CCNC: 36 U/L — SIGNIFICANT CHANGE UP (ref 30–115)
ALP SERPL-CCNC: 37 U/L — SIGNIFICANT CHANGE UP (ref 30–115)
ALT FLD-CCNC: 14 U/L — SIGNIFICANT CHANGE UP (ref 0–41)
ALT FLD-CCNC: 14 U/L — SIGNIFICANT CHANGE UP (ref 0–41)
ANION GAP SERPL CALC-SCNC: 12 MMOL/L — SIGNIFICANT CHANGE UP (ref 7–14)
ANION GAP SERPL CALC-SCNC: 17 MMOL/L — HIGH (ref 7–14)
APTT BLD: 31.7 SEC — SIGNIFICANT CHANGE UP (ref 27–39.2)
AST SERPL-CCNC: 39 U/L — SIGNIFICANT CHANGE UP (ref 0–41)
AST SERPL-CCNC: 39 U/L — SIGNIFICANT CHANGE UP (ref 0–41)
BASE EXCESS BLDMV CALC-SCNC: -3 MMOL/L — SIGNIFICANT CHANGE UP
BASE EXCESS BLDMV CALC-SCNC: -3 MMOL/L — SIGNIFICANT CHANGE UP
BASOPHILS # BLD AUTO: 0.03 K/UL — SIGNIFICANT CHANGE UP (ref 0–0.2)
BASOPHILS NFR BLD AUTO: 0.4 % — SIGNIFICANT CHANGE UP (ref 0–1)
BILIRUB SERPL-MCNC: 0.8 MG/DL — SIGNIFICANT CHANGE UP (ref 0.2–1.2)
BILIRUB SERPL-MCNC: 0.8 MG/DL — SIGNIFICANT CHANGE UP (ref 0.2–1.2)
BUN SERPL-MCNC: 12 MG/DL — SIGNIFICANT CHANGE UP (ref 10–20)
BUN SERPL-MCNC: 13 MG/DL — SIGNIFICANT CHANGE UP (ref 10–20)
CALCIUM SERPL-MCNC: 8.7 MG/DL — SIGNIFICANT CHANGE UP (ref 8.5–10.1)
CALCIUM SERPL-MCNC: 8.8 MG/DL — SIGNIFICANT CHANGE UP (ref 8.5–10.1)
CHLORIDE SERPL-SCNC: 101 MMOL/L — SIGNIFICANT CHANGE UP (ref 98–110)
CHLORIDE SERPL-SCNC: 102 MMOL/L — SIGNIFICANT CHANGE UP (ref 98–110)
CO2 SERPL-SCNC: 17 MMOL/L — SIGNIFICANT CHANGE UP (ref 17–32)
CO2 SERPL-SCNC: 20 MMOL/L — SIGNIFICANT CHANGE UP (ref 17–32)
CREAT SERPL-MCNC: 0.8 MG/DL — SIGNIFICANT CHANGE UP (ref 0.7–1.5)
CREAT SERPL-MCNC: 0.8 MG/DL — SIGNIFICANT CHANGE UP (ref 0.7–1.5)
EOSINOPHIL # BLD AUTO: 0.01 K/UL — SIGNIFICANT CHANGE UP (ref 0–0.7)
EOSINOPHIL NFR BLD AUTO: 0.1 % — SIGNIFICANT CHANGE UP (ref 0–8)
GAS PNL BLDA: SIGNIFICANT CHANGE UP
GAS PNL BLDMV: SIGNIFICANT CHANGE UP
GAS PNL BLDMV: SIGNIFICANT CHANGE UP
GLUCOSE BLDC GLUCOMTR-MCNC: 116 MG/DL — HIGH (ref 70–99)
GLUCOSE BLDC GLUCOMTR-MCNC: 118 MG/DL — HIGH (ref 70–99)
GLUCOSE BLDC GLUCOMTR-MCNC: 129 MG/DL — HIGH (ref 70–99)
GLUCOSE BLDC GLUCOMTR-MCNC: 131 MG/DL — HIGH (ref 70–99)
GLUCOSE BLDC GLUCOMTR-MCNC: 132 MG/DL — HIGH (ref 70–99)
GLUCOSE BLDC GLUCOMTR-MCNC: 137 MG/DL — HIGH (ref 70–99)
GLUCOSE BLDC GLUCOMTR-MCNC: 141 MG/DL — HIGH (ref 70–99)
GLUCOSE BLDC GLUCOMTR-MCNC: 142 MG/DL — HIGH (ref 70–99)
GLUCOSE BLDC GLUCOMTR-MCNC: 153 MG/DL — HIGH (ref 70–99)
GLUCOSE BLDC GLUCOMTR-MCNC: 158 MG/DL — HIGH (ref 70–99)
GLUCOSE BLDC GLUCOMTR-MCNC: 173 MG/DL — HIGH (ref 70–99)
GLUCOSE BLDC GLUCOMTR-MCNC: 191 MG/DL — HIGH (ref 70–99)
GLUCOSE BLDC GLUCOMTR-MCNC: 209 MG/DL — HIGH (ref 70–99)
GLUCOSE BLDC GLUCOMTR-MCNC: 236 MG/DL — HIGH (ref 70–99)
GLUCOSE BLDC GLUCOMTR-MCNC: 291 MG/DL — HIGH (ref 70–99)
GLUCOSE BLDC GLUCOMTR-MCNC: 74 MG/DL — SIGNIFICANT CHANGE UP (ref 70–99)
GLUCOSE BLDC GLUCOMTR-MCNC: 76 MG/DL — SIGNIFICANT CHANGE UP (ref 70–99)
GLUCOSE BLDC GLUCOMTR-MCNC: 89 MG/DL — SIGNIFICANT CHANGE UP (ref 70–99)
GLUCOSE SERPL-MCNC: 244 MG/DL — HIGH (ref 70–99)
GLUCOSE SERPL-MCNC: 328 MG/DL — HIGH (ref 70–99)
HCO3 BLDMV-SCNC: 23 MMOL/L — SIGNIFICANT CHANGE UP
HCO3 BLDMV-SCNC: 23 MMOL/L — SIGNIFICANT CHANGE UP
HCT VFR BLD CALC: 31.8 % — LOW (ref 42–52)
HGB BLD-MCNC: 10.9 G/DL — LOW (ref 14–18)
IMM GRANULOCYTES NFR BLD AUTO: 0.5 % — HIGH (ref 0.1–0.3)
INR BLD: 1.28 RATIO — SIGNIFICANT CHANGE UP (ref 0.65–1.3)
LYMPHOCYTES # BLD AUTO: 0.72 K/UL — LOW (ref 1.2–3.4)
LYMPHOCYTES # BLD AUTO: 8.8 % — LOW (ref 20.5–51.1)
MAGNESIUM SERPL-MCNC: 2.3 MG/DL — SIGNIFICANT CHANGE UP (ref 1.8–2.4)
MCHC RBC-ENTMCNC: 28.9 PG — SIGNIFICANT CHANGE UP (ref 27–31)
MCHC RBC-ENTMCNC: 34.3 G/DL — SIGNIFICANT CHANGE UP (ref 32–37)
MCV RBC AUTO: 84.4 FL — SIGNIFICANT CHANGE UP (ref 80–94)
MONOCYTES # BLD AUTO: 0.65 K/UL — HIGH (ref 0.1–0.6)
MONOCYTES NFR BLD AUTO: 7.9 % — SIGNIFICANT CHANGE UP (ref 1.7–9.3)
NEUTROPHILS # BLD AUTO: 6.77 K/UL — HIGH (ref 1.4–6.5)
NEUTROPHILS NFR BLD AUTO: 82.3 % — HIGH (ref 42.2–75.2)
NRBC # BLD: 0 /100 WBCS — SIGNIFICANT CHANGE UP (ref 0–0)
O2 CT VFR BLD CALC: 38 MMHG — SIGNIFICANT CHANGE UP (ref 35–40)
O2 CT VFR BLD CALC: 38 MMHG — SIGNIFICANT CHANGE UP (ref 35–40)
PCO2 BLDMV: 44 MMHG — SIGNIFICANT CHANGE UP (ref 41–51)
PCO2 BLDMV: 44 MMHG — SIGNIFICANT CHANGE UP (ref 41–51)
PH BLDMV: 7.33 — SIGNIFICANT CHANGE UP (ref 7.33–7.44)
PH BLDMV: 7.33 — SIGNIFICANT CHANGE UP (ref 7.33–7.44)
PLATELET # BLD AUTO: 115 K/UL — LOW (ref 130–400)
POTASSIUM SERPL-MCNC: 4.4 MMOL/L — SIGNIFICANT CHANGE UP (ref 3.5–5)
POTASSIUM SERPL-MCNC: 5.7 MMOL/L — HIGH (ref 3.5–5)
POTASSIUM SERPL-SCNC: 4.4 MMOL/L — SIGNIFICANT CHANGE UP (ref 3.5–5)
POTASSIUM SERPL-SCNC: 5.7 MMOL/L — HIGH (ref 3.5–5)
PROT SERPL-MCNC: 5.9 G/DL — LOW (ref 6–8)
PROT SERPL-MCNC: 6.3 G/DL — SIGNIFICANT CHANGE UP (ref 6–8)
PROTHROM AB SERPL-ACNC: 14.7 SEC — HIGH (ref 9.95–12.87)
RBC # BLD: 3.77 M/UL — LOW (ref 4.7–6.1)
RBC # FLD: 13 % — SIGNIFICANT CHANGE UP (ref 11.5–14.5)
SAO2 % BLDMV: 74 % — SIGNIFICANT CHANGE UP (ref 70–75)
SAO2 % BLDMV: 74 % — SIGNIFICANT CHANGE UP (ref 70–75)
SODIUM SERPL-SCNC: 134 MMOL/L — LOW (ref 135–146)
SODIUM SERPL-SCNC: 135 MMOL/L — SIGNIFICANT CHANGE UP (ref 135–146)
T3 SERPL-MCNC: 111 NG/DL — SIGNIFICANT CHANGE UP (ref 80–200)
T4 AB SER-ACNC: 5.8 UG/DL — SIGNIFICANT CHANGE UP (ref 4.6–12)
TSH SERPL-MCNC: 3.75 UIU/ML — SIGNIFICANT CHANGE UP (ref 0.27–4.2)
WBC # BLD: 8.22 K/UL — SIGNIFICANT CHANGE UP (ref 4.8–10.8)
WBC # FLD AUTO: 8.22 K/UL — SIGNIFICANT CHANGE UP (ref 4.8–10.8)

## 2020-02-21 PROCEDURE — 71045 X-RAY EXAM CHEST 1 VIEW: CPT | Mod: 26

## 2020-02-21 PROCEDURE — 99233 SBSQ HOSP IP/OBS HIGH 50: CPT

## 2020-02-21 PROCEDURE — 93010 ELECTROCARDIOGRAM REPORT: CPT

## 2020-02-21 PROCEDURE — 71045 X-RAY EXAM CHEST 1 VIEW: CPT | Mod: 26,77

## 2020-02-21 RX ORDER — HYDROMORPHONE HYDROCHLORIDE 2 MG/ML
1 INJECTION INTRAMUSCULAR; INTRAVENOUS; SUBCUTANEOUS ONCE
Refills: 0 | Status: DISCONTINUED | OUTPATIENT
Start: 2020-02-21 | End: 2020-02-21

## 2020-02-21 RX ORDER — ACETAMINOPHEN 500 MG
650 TABLET ORAL ONCE
Refills: 0 | Status: COMPLETED | OUTPATIENT
Start: 2020-02-21 | End: 2020-02-21

## 2020-02-21 RX ORDER — SIMETHICONE 80 MG/1
80 TABLET, CHEWABLE ORAL
Refills: 0 | Status: DISCONTINUED | OUTPATIENT
Start: 2020-02-21 | End: 2020-02-24

## 2020-02-21 RX ORDER — SENNA PLUS 8.6 MG/1
1 TABLET ORAL EVERY 12 HOURS
Refills: 0 | Status: DISCONTINUED | OUTPATIENT
Start: 2020-02-21 | End: 2020-02-24

## 2020-02-21 RX ORDER — ONDANSETRON 8 MG/1
4 TABLET, FILM COATED ORAL ONCE
Refills: 0 | Status: COMPLETED | OUTPATIENT
Start: 2020-02-21 | End: 2020-02-21

## 2020-02-21 RX ORDER — FENTANYL CITRATE 50 UG/ML
25 INJECTION INTRAVENOUS ONCE
Refills: 0 | Status: DISCONTINUED | OUTPATIENT
Start: 2020-02-21 | End: 2020-02-21

## 2020-02-21 RX ORDER — ATORVASTATIN CALCIUM 80 MG/1
40 TABLET, FILM COATED ORAL AT BEDTIME
Refills: 0 | Status: DISCONTINUED | OUTPATIENT
Start: 2020-02-21 | End: 2020-02-24

## 2020-02-21 RX ORDER — METOPROLOL TARTRATE 50 MG
25 TABLET ORAL EVERY 12 HOURS
Refills: 0 | Status: DISCONTINUED | OUTPATIENT
Start: 2020-02-21 | End: 2020-02-22

## 2020-02-21 RX ORDER — OXYCODONE HYDROCHLORIDE 5 MG/1
10 TABLET ORAL ONCE
Refills: 0 | Status: DISCONTINUED | OUTPATIENT
Start: 2020-02-21 | End: 2020-02-21

## 2020-02-21 RX ORDER — KETOROLAC TROMETHAMINE 30 MG/ML
30 SYRINGE (ML) INJECTION EVERY 6 HOURS
Refills: 0 | Status: DISCONTINUED | OUTPATIENT
Start: 2020-02-21 | End: 2020-02-22

## 2020-02-21 RX ORDER — ACETAMINOPHEN 500 MG
650 TABLET ORAL EVERY 6 HOURS
Refills: 0 | Status: DISCONTINUED | OUTPATIENT
Start: 2020-02-21 | End: 2020-02-21

## 2020-02-21 RX ORDER — HEPARIN SODIUM 5000 [USP'U]/ML
5000 INJECTION INTRAVENOUS; SUBCUTANEOUS EVERY 8 HOURS
Refills: 0 | Status: DISCONTINUED | OUTPATIENT
Start: 2020-02-21 | End: 2020-02-24

## 2020-02-21 RX ORDER — GABAPENTIN 400 MG/1
300 CAPSULE ORAL EVERY 8 HOURS
Refills: 0 | Status: COMPLETED | OUTPATIENT
Start: 2020-02-21 | End: 2020-02-22

## 2020-02-21 RX ORDER — ALBUMIN HUMAN 25 %
500 VIAL (ML) INTRAVENOUS ONCE
Refills: 0 | Status: COMPLETED | OUTPATIENT
Start: 2020-02-21 | End: 2020-02-21

## 2020-02-21 RX ORDER — IPRATROPIUM BROMIDE 0.2 MG/ML
500 SOLUTION, NON-ORAL INHALATION EVERY 6 HOURS
Refills: 0 | Status: DISCONTINUED | OUTPATIENT
Start: 2020-02-21 | End: 2020-02-24

## 2020-02-21 RX ADMIN — FAMOTIDINE 20 MILLIGRAM(S): 10 INJECTION INTRAVENOUS at 05:21

## 2020-02-21 RX ADMIN — CHLORHEXIDINE GLUCONATE 15 MILLILITER(S): 213 SOLUTION TOPICAL at 05:21

## 2020-02-21 RX ADMIN — Medication 325 MILLIGRAM(S): at 11:56

## 2020-02-21 RX ADMIN — HYDROMORPHONE HYDROCHLORIDE 1 MILLIGRAM(S): 2 INJECTION INTRAMUSCULAR; INTRAVENOUS; SUBCUTANEOUS at 06:22

## 2020-02-21 RX ADMIN — Medication 650 MILLIGRAM(S): at 12:25

## 2020-02-21 RX ADMIN — ONDANSETRON 4 MILLIGRAM(S): 8 TABLET, FILM COATED ORAL at 13:30

## 2020-02-21 RX ADMIN — Medication 30 MILLIGRAM(S): at 14:43

## 2020-02-21 RX ADMIN — FENTANYL CITRATE 25 MICROGRAM(S): 50 INJECTION INTRAVENOUS at 00:00

## 2020-02-21 RX ADMIN — Medication 500 MICROGRAM(S): at 13:55

## 2020-02-21 RX ADMIN — Medication 30 MILLIGRAM(S): at 15:00

## 2020-02-21 RX ADMIN — PANTOPRAZOLE SODIUM 40 MILLIGRAM(S): 20 TABLET, DELAYED RELEASE ORAL at 06:07

## 2020-02-21 RX ADMIN — SODIUM CHLORIDE 20 MILLILITER(S): 9 INJECTION INTRAMUSCULAR; INTRAVENOUS; SUBCUTANEOUS at 17:56

## 2020-02-21 RX ADMIN — SENNA PLUS 1 TABLET(S): 8.6 TABLET ORAL at 17:14

## 2020-02-21 RX ADMIN — Medication 650 MILLIGRAM(S): at 11:55

## 2020-02-21 RX ADMIN — ONDANSETRON 104 MILLIGRAM(S): 8 TABLET, FILM COATED ORAL at 22:12

## 2020-02-21 RX ADMIN — OXYCODONE HYDROCHLORIDE 5 MILLIGRAM(S): 5 TABLET ORAL at 12:25

## 2020-02-21 RX ADMIN — GABAPENTIN 300 MILLIGRAM(S): 400 CAPSULE ORAL at 20:03

## 2020-02-21 RX ADMIN — OXYCODONE HYDROCHLORIDE 5 MILLIGRAM(S): 5 TABLET ORAL at 11:55

## 2020-02-21 RX ADMIN — Medication 25 MILLIGRAM(S): at 17:14

## 2020-02-21 RX ADMIN — HEPARIN SODIUM 5000 UNIT(S): 5000 INJECTION INTRAVENOUS; SUBCUTANEOUS at 15:27

## 2020-02-21 RX ADMIN — Medication 2: at 07:02

## 2020-02-21 RX ADMIN — OXYCODONE HYDROCHLORIDE 10 MILLIGRAM(S): 5 TABLET ORAL at 06:25

## 2020-02-21 RX ADMIN — OXYCODONE HYDROCHLORIDE 10 MILLIGRAM(S): 5 TABLET ORAL at 06:08

## 2020-02-21 RX ADMIN — Medication 500 MICROGRAM(S): at 20:27

## 2020-02-21 RX ADMIN — POLYETHYLENE GLYCOL 3350 17 GRAM(S): 17 POWDER, FOR SOLUTION ORAL at 11:56

## 2020-02-21 RX ADMIN — FENTANYL CITRATE 25 MICROGRAM(S): 50 INJECTION INTRAVENOUS at 01:45

## 2020-02-21 RX ADMIN — Medication 25 MILLIGRAM(S): at 09:12

## 2020-02-21 RX ADMIN — SIMETHICONE 80 MILLIGRAM(S): 80 TABLET, CHEWABLE ORAL at 17:13

## 2020-02-21 RX ADMIN — Medication 100 MILLIGRAM(S): at 05:22

## 2020-02-21 RX ADMIN — Medication 30 MILLIGRAM(S): at 08:15

## 2020-02-21 RX ADMIN — FENTANYL CITRATE 25 MICROGRAM(S): 50 INJECTION INTRAVENOUS at 02:00

## 2020-02-21 RX ADMIN — OXYCODONE HYDROCHLORIDE 10 MILLIGRAM(S): 5 TABLET ORAL at 01:33

## 2020-02-21 RX ADMIN — HEPARIN SODIUM 5000 UNIT(S): 5000 INJECTION INTRAVENOUS; SUBCUTANEOUS at 21:02

## 2020-02-21 RX ADMIN — Medication 100 GRAM(S): at 05:22

## 2020-02-21 RX ADMIN — ONDANSETRON 4 MILLIGRAM(S): 8 TABLET, FILM COATED ORAL at 18:10

## 2020-02-21 RX ADMIN — Medication 30 MILLIGRAM(S): at 22:30

## 2020-02-21 RX ADMIN — Medication 30 MILLIGRAM(S): at 08:00

## 2020-02-21 RX ADMIN — OXYCODONE HYDROCHLORIDE 10 MILLIGRAM(S): 5 TABLET ORAL at 01:03

## 2020-02-21 RX ADMIN — FENTANYL CITRATE 25 MICROGRAM(S): 50 INJECTION INTRAVENOUS at 05:34

## 2020-02-21 RX ADMIN — Medication 30 MILLIGRAM(S): at 21:00

## 2020-02-21 RX ADMIN — Medication 100 GRAM(S): at 17:13

## 2020-02-21 RX ADMIN — Medication 600 MILLIGRAM(S): at 17:13

## 2020-02-21 RX ADMIN — Medication 250 MILLILITER(S): at 05:20

## 2020-02-21 RX ADMIN — HYDROMORPHONE HYDROCHLORIDE 1 MILLIGRAM(S): 2 INJECTION INTRAMUSCULAR; INTRAVENOUS; SUBCUTANEOUS at 06:45

## 2020-02-21 RX ADMIN — FENTANYL CITRATE 25 MICROGRAM(S): 50 INJECTION INTRAVENOUS at 05:30

## 2020-02-21 RX ADMIN — CHLORHEXIDINE GLUCONATE 1 APPLICATION(S): 213 SOLUTION TOPICAL at 05:34

## 2020-02-21 RX ADMIN — Medication 100 MILLIGRAM(S): at 13:34

## 2020-02-21 RX ADMIN — ATORVASTATIN CALCIUM 40 MILLIGRAM(S): 80 TABLET, FILM COATED ORAL at 21:02

## 2020-02-21 NOTE — PHYSICAL THERAPY INITIAL EVALUATION ADULT - PHYSICAL ASSIST/NONPHYSICAL ASSIST: SIT/STAND, REHAB EVAL
verbal cues/1 person + 1 person to manage equipment/verbal cues for sternal precautions and sequencing

## 2020-02-21 NOTE — DISCHARGE NOTE PROVIDER - CARE PROVIDERS DIRECT ADDRESSES
,[email or web address]

## 2020-02-21 NOTE — PROGRESS NOTE ADULT - SUBJECTIVE AND OBJECTIVE BOX
SUBJECTIVE ASSESSMENT:  pt reports significant sternal pain    Vital Signs Last 24 Hrs  T(C): 37.5 (2020 04:00), Max: 38.3 (2020 20:00)  T(F): 99.5 (2020 04:00), Max: 100.9 (2020 20:00)  HR: 97 (2020 07:30) (88 - 112)  BP: 95/70 (2020 19:15) (95/70 - 135/88)  BP(mean): 77 (2020 19:15) (77 - 77)  RR: 15 (2020 07:30) (0 - 26)  SpO2: 99% (2020 07:30) (96% - 100%)  20 @ 07:01  -  20 @ 07:00  --------------------------------------------------------  IN: 3272.9 mL / OUT: 2107 mL / NET: 1165.9 mL    A&OX3 in NAD  CTA bilat  sternum stable, no drainage  nl s1, s2  abd soft/NT/ND  no peripheral edema  SVG harvest site is healing well    LABS:                        10.9   8.22  )-----------( 115      ( 2020 02:00 )             31.8     PT/INR - ( 2020 02:00 )   PT: 14.70 sec;   INR: 1.28 ratio       PTT - ( 2020 02:00 )  PTT:31.7 sec      134<L>  |  102  |  13  ----------------------------<  244<H>  4.4   |  20  |  0.8    Ca    8.8      2020 05:00  Mg     2.3         TPro  6.3  /  Alb  4.8  /  TBili  0.8  /  DBili  x   /  AST  39  /  ALT  14  /  AlkPhos  37      Urinalysis Basic - ( 2020 01:00 )    Color: Light Yellow / Appearance: Clear / S.027 / pH: x  Gluc: x / Ketone: Negative  / Bili: Negative / Urobili: <2 mg/dL   Blood: x / Protein: Negative / Nitrite: Negative   Leuk Esterase: Negative / RBC: x / WBC x   Sq Epi: x / Non Sq Epi: x / Bacteria: x    MEDICATIONS  (STANDING):  aspirin enteric coated 325 milliGRAM(s) Oral daily  aspirin enteric coated 81 milliGRAM(s) Oral daily  aspirin Suppository 300 milliGRAM(s) Rectal once  atorvastatin 10 milliGRAM(s) Oral at bedtime  ceFAZolin   IVPB 1000 milliGRAM(s) IV Intermittent every 8 hours  famotidine Injectable 20 milliGRAM(s) IV Push every 12 hours  insulin lispro (HumaLOG) corrective regimen sliding scale   SubCutaneous three times a day before meals  insulin regular human (HumuLIN R U-100) Pump 1 Each SubCutaneous Continuous Pump  insulin regular Infusion 1 Unit(s)/Hr (1 mL/Hr) IV Continuous <Continuous>  magnesium sulfate  IVPB 1 Gram(s) IV Intermittent every 12 hours  meperidine     Injectable 25 milliGRAM(s) IV Push once  niCARdipine Infusion 5 mG/Hr (25 mL/Hr) IV Continuous <Continuous>  nitroglycerin  Infusion 5 MICROgram(s)/Min (1.5 mL/Hr) IV Continuous <Continuous>  pantoprazole    Tablet 40 milliGRAM(s) Oral before breakfast  polyethylene glycol 3350 17 Gram(s) Oral daily    MEDICATIONS  (PRN):  dextrose 40% Gel 15 Gram(s) Oral once PRN Blood Glucose LESS THAN 70 milliGRAM(s)/deciliter  glucagon  Injectable 1 milliGRAM(s) IntraMuscular once PRN Glucose LESS THAN 70 milligrams/deciliter  ondansetron  IVPB 4 milliGRAM(s) IV Intermittent once PRN Nausea and/or Vomiting  oxyCODONE    IR 5 milliGRAM(s) Oral every 4 hours PRN Moderate Pain (4 - 6)  oxyCODONE    IR 10 milliGRAM(s) Oral every 4 hours PRN Severe Pain (7 - 10)

## 2020-02-21 NOTE — DISCHARGE NOTE PROVIDER - NSDCHHHOMEBOUND_GEN_ALL_CORE
Other, specify... Chest pain/weakness during/after ambulation   greater than 20 feet/Other, specify...

## 2020-02-21 NOTE — PHYSICAL THERAPY INITIAL EVALUATION ADULT - PHYSICAL ASSIST/NONPHYSICAL ASSIST: STAND/SIT, REHAB EVAL
1 person + 1 person to manage equipment/verbal cues for proper alignment with chair surface prior to sitting/verbal cues

## 2020-02-21 NOTE — PROGRESS NOTE ADULT - SUBJECTIVE AND OBJECTIVE BOX
CTU Attending Progress Daily Note     2020 10:50  Admited 20, Hospital Day 2d  POD# - 1    HPI:  40 y/o M with PMH of Type I DM on insulin pump for 32 years, DLD presented for [facility] after abnormal CT coronaries with multiple blockages     20 Calcium Score 1300.81  Severe stenosis >75% in prox, mid LAD, prox ramus intermedius  Moderate stenosis in distal circ and mod-severe stenosis in prox OM1  RPDA moderate stenosis (2020 09:34)    Home Medications:  aspirin 81 mg oral tablet: 1 tab(s) orally once a day (2020 09:42)  HumaLOG Cartridge 100 units/mL subcutaneous solution:  (2020 09:42)  Lipitor 10 mg oral tablet: 1 tab(s) orally once a day (2020 09:42)    FAMILY HISTORY:  No pertinent family history in first degree relatives    PAST MEDICAL & SURGICAL HISTORY:  Hyperlipidemia  Diabetes, type I  History of trigger finger    Interval event for past 24 hr:  LIZ ALVARADO  39y had no event.   Current Complains:  LIZ ALVARADO has no new complains  Allergies    No Known Allergies    Intolerances      OBJECTIVE:  Vitals last 24 hrs  T(C): 37 (20 @ 08:00), Max: 38.3 (20 @ 20:00)  T(F): 98.6 (20 @ 08:00), Max: 100.9 (20 @ 20:00)  HR: 95 (20 @ 10:00) (93 - 112)  BP: 95/70 (20 @ 19:15) (95/70 - 95/70)  ABP: 118/60 (20 @ 10:00) (98/57 - 159/72)  ABP(mean): 77 (20 @ 10:00) (65 - 95)  RR: 22 (20 @ 10:00) (0 - 26)  SpO2: 99% (20 @ 10:00) (96% - 100%)  CVP(mm Hg): 217 (20 @ 08:00) (6 - 317)  CI: 4.4 (20 @ 07:56) (3 - 4.4)  PA: 17/7 (20 @ 08:00) ( - )  PA(direct): --  PCWP: --  SVR: 567 (20 @ 07:56) (-52 - 449)  PVR: --    20 @ 07:01  -  20 @ 07:00  --------------------------------------------------------  IN:    Albumin 5%  - 500 mL: 2000 mL    dexmedetomidine Infusion: 42.9 mL    insulin regular Infusion: 60 mL    IV PiggyBack: 250 mL    niCARdipine Infusion: 80 mL    nitroglycerin  Infusion: 290 mL    Oral Fluid: 200 mL    propofol Infusion: 30 mL    sodium chloride 0.9%.: 320 mL  Total IN: 3272.9 mL    OUT:    Chest Tube: 200 mL    Chest Tube: 390 mL    Indwelling Catheter - Urethral: 1517 mL  Total OUT: 2107 mL    Total NET: 1165.9 mL        CAPILLARY BLOOD GLUCOSE      POCT Blood Glucose.: 76 mg/dL (2020 10:07)  POCT Blood Glucose.: 89 mg/dL (2020 09:33)  POCT Blood Glucose.: 132 mg/dL (2020 08:23)  POCT Blood Glucose.: 173 mg/dL (2020 07:01)  POCT Blood Glucose.: 191 mg/dL (2020 06:34)  POCT Blood Glucose.: 209 mg/dL (2020 05:04)  POCT Blood Glucose.: 236 mg/dL (2020 04:11)  POCT Blood Glucose.: 291 mg/dL (2020 02:41)  POCT Blood Glucose.: 158 mg/dL (2020 00:57)  POCT Blood Glucose.: 74 mg/dL (2020 00:17)  POCT Blood Glucose.: 99 mg/dL (2020 23:16)  POCT Blood Glucose.: 131 mg/dL (2020 21:42)  POCT Blood Glucose.: 177 mg/dL (2020 19:22)  POCT Blood Glucose.: 144 mg/dL (2020 17:19)  POCT Blood Glucose.: 131 mg/dL (2020 16:18)    LABS:  ABG - ( 2020 03:44 )  pH, Arterial: 7.35  pH, Blood: x     /  pCO2: 40    /  pO2: 97    / HCO3: 22    / Base Excess: -3.3  /  SaO2: 98              Blood Gas Arterial, Lactate: 0.7 mmoL/L (20 @ 03:44)  Blood Gas Arterial, Lactate: 0.8 mmoL/L (20 @ 18:42)  Blood Gas Arterial, Lactate: 0.9 mmoL/L (20 @ 17:29)  Blood Gas Arterial, Lactate: 3.1 mmoL/L <HH> (20 @ 15:37)                          10.9   8.22  )-----------( 115      ( 2020 02:00 )             31.8     Hemoglobin: 10.9 g/dL ( @ 02:00)  Hemoglobin: 12.4 g/dL ( @ 15:34)  Hemoglobin: 11.5 g/dL ( @ 13:40)  Hemoglobin: 15.1 g/dL ( @ 13:22)  Hemoglobin: 16.6 g/dL ( @ 10:16)        134<L>  |  102  |  13  ----------------------------<  244<H>  4.4   |  20  |  0.8    Ca    8.8      2020 05:00  Mg     2.3         TPro  6.3  /  Alb  4.8  /  TBili  0.8  /  DBili  x   /  AST  39  /  ALT  14  /  AlkPhos  37      Creatinine, Serum: 0.8 mg/dL ( @ 05:00)  Creatinine, Serum: 0.8 mg/dL ( @ 02:00)  Creatinine, Serum: 0.9 mg/dL ( @ 15:34)  Creatinine, Serum: 0.9 mg/dL ( @ 01:00)  Creatinine, Serum: 0.7 mg/dL ( 13:22)  Creatinine, Serum: 0.8 mg/dL ( @ 10:16)    PT/INR - ( 2020 02:00 )   PT: 14.70 sec;   INR: 1.28 ratio         PTT - ( 2020 02:00 )  PTT:31.7 sec  Urinalysis Basic - ( 2020 01:00 )    Color: Light Yellow / Appearance: Clear / S.027 / pH: x  Gluc: x / Ketone: Negative  / Bili: Negative / Urobili: <2 mg/dL   Blood: x / Protein: Negative / Nitrite: Negative   Leuk Esterase: Negative / RBC: x / WBC x   Sq Epi: x / Non Sq Epi: x / Bacteria: x        HOSPITAL MEDICATIONS:  MEDICATIONS  (STANDING):  aspirin enteric coated 325 milliGRAM(s) Oral daily  atorvastatin 40 milliGRAM(s) Oral at bedtime  ceFAZolin   IVPB 1000 milliGRAM(s) IV Intermittent every 8 hours  chlorhexidine 4% Liquid 1 Application(s) Topical <User Schedule>  dextrose 5%. 1000 milliLiter(s) (50 mL/Hr) IV Continuous <Continuous>  dextrose 50% Injectable 12.5 Gram(s) IV Push once  dextrose 50% Injectable 25 Gram(s) IV Push once  dextrose 50% Injectable 50 milliLiter(s) IV Push every 15 minutes  dextrose 50% Injectable 25 milliLiter(s) IV Push every 15 minutes  guaiFENesin  milliGRAM(s) Oral every 12 hours  insulin regular human (HumuLIN R U-100) Pump 1 Each SubCutaneous Continuous Pump  insulin regular Infusion 1 Unit(s)/Hr (1 mL/Hr) IV Continuous <Continuous>  ipratropium    for Nebulization 500 MICROGram(s) Nebulizer every 6 hours  magnesium sulfate  IVPB 1 Gram(s) IV Intermittent every 12 hours  meperidine     Injectable 25 milliGRAM(s) IV Push once  metoprolol tartrate 25 milliGRAM(s) Oral every 12 hours  niCARdipine Infusion 5 mG/Hr (25 mL/Hr) IV Continuous <Continuous>  nitroglycerin  Infusion 5 MICROgram(s)/Min (1.5 mL/Hr) IV Continuous <Continuous>  pantoprazole    Tablet 40 milliGRAM(s) Oral before breakfast  polyethylene glycol 3350 17 Gram(s) Oral daily  senna 1 Tablet(s) Oral every 12 hours  simethicone 80 milliGRAM(s) Chew two times a day  sodium chloride 0.9%. 1000 milliLiter(s) (20 mL/Hr) IV Continuous <Continuous>    MEDICATIONS  (PRN):  dextrose 40% Gel 15 Gram(s) Oral once PRN Blood Glucose LESS THAN 70 milliGRAM(s)/deciliter  glucagon  Injectable 1 milliGRAM(s) IntraMuscular once PRN Glucose LESS THAN 70 milligrams/deciliter  ketorolac   Injectable 30 milliGRAM(s) IV Push every 6 hours PRN Moderate Pain (4 - 6)  ondansetron  IVPB 4 milliGRAM(s) IV Intermittent once PRN Nausea and/or Vomiting  oxyCODONE    IR 5 milliGRAM(s) Oral every 4 hours PRN Moderate Pain (4 - 6)  oxyCODONE    IR 10 milliGRAM(s) Oral every 4 hours PRN Severe Pain (7 - 10)      REVIEW OF SYSTEMS:  CONSTITUTIONAL: [X] all negative; [ ] weakness, [ ] fevers, [ ] chills  EYES/ENT: [X] all negative; [ ] visual changes, [ ] vertigo, [ ] throat pain, [ ] eye pain  NECK: [X] all negative; [ ] pain, [ ] stiffness  RESPIRATORY: [ ] all negative, [x ] cough, [ ] wheezing, [ ] hemoptysis, [x ] shortness of breath  CARDIOVASCULAR: [ ] all negative; [x ] chest pain, [ ] palpitations, [ ] orthopnea  GASTROINTESTINAL: [X] all negative; [ ]abdominal pain, [ ] nausea, [ ] vomiting, [ ] hematemesis, [ ] diarrhea, [ ] constipation, [ ] melena, [ ] hematochezia.  GENITOURINARY: [X] all negative; [ ] dysuria, [ ] frequency, [ ] hematuria  NEUROLOGICAL: [X] all negative; [ ] numbness, [ ] weakness, [ ] paresthesias  MUSCULOSKELETAL: [X] all negative, [ ] joint pain, [ ] joint swelling, [ ] joint redness, [ ] bone pain  SKIN: [X] all negative; [ ] itching, [ ] burning, [ ] rashes, [ ] lesions   All other review of systems is negative unless indicated above.    [  ] Unable to assess ROS because     PHYSICAL EXAM:          CONSTITUTIONAL: Well-developed; well-nourished; in no acute distress.   	SKIN: warm, dry, no rashes or lesions  	HEENT: Atraumatic. Normocephalic. PERRL. Moist membranes, no conj injection, sclera clear  	NECK: Supple; non tender.  No JVD. No lymphadenopathy.  	CARD: Normal S1, S2. Rate and Rhythm are regular. + murmurs.  	RESP: Good air entry bilaterally, no wheezes, no rales no rhonchi.  	ABD: Soft, not tender, not distended, no CVA ttp no rebound no guarding, bowel sounds present  	EXT: Normal ROM.  No clubbing, no cyanosis, no pedal edema, no calf pain b/l, Peripheral pulses intact.  	LYMPH: No acute cervical adenopathy.  	NEURO: Alert, awake, motor 5/5 R, 5/5 L, sensation intact bilat, CN 2-12 intact,          PSYCH: Cooperative, appropriate. Alert & oriented x 3    RADIOLOGY:  X Reviewed and interpreted by me  CxR from 20 shows mild congestion, no pneumothorax, no effusion, no cardiomegaly,   S-G Catheter in place, Chest Tubes in place,     ECG:  X Reviewed and interpreted by me - NSR  100, QTc - 433

## 2020-02-21 NOTE — PROGRESS NOTE ADULT - ATTENDING COMMENTS
POD 1 after C3IMA  doing well  d/c swan, hemodynamics is stable  wean nitro/cardene  CT m 390cc, CTpl 190cc  start ASA/SQH/BB  hold diuresis  Cont pulmonary toilet, Chest PT, pain control, Incentive spirometry  OOB ambulate  case d/w CTU team

## 2020-02-21 NOTE — PHYSICAL THERAPY INITIAL EVALUATION ADULT - LIVES WITH, PROFILE
children/spouse/Pt states he lives with wife and 3 children in  with 5 SVEN, 2 HRs, and 6 stairs to 2nd floor with 1 HR ,1 bannister

## 2020-02-21 NOTE — DISCHARGE NOTE PROVIDER - CARE PROVIDER_API CALL
Philip Jenkins)  Surgery; Thoracic and Cardiac Surgery  [address] [place] [address], [address]  [place], [place] [id number]  Phone: [phone]  Fax: [phone]  Follow Up Time: 1 week    Jose Alejandro Sanderson)  Internal Medicine  [address] [place] [address]  Phone: [phone]  Fax: [phone]  Follow Up Time: 1 month    Paras Rizo)  Cardiovascular Disease  [address] [place] [address], [place] [address]  Phone: [phone]  Fax: [phone]  Follow Up Time: 1 month Philip Jenkins)  Surgery; Thoracic and Cardiac Surgery  [address] [place] [address], [facility] [address]  [place], [place] [id number]  Phone: [phone]  Fax: [phone]  Scheduled Appointment: 03/02/2020 01:15 PM    Jose Alejandro Sanderson)  Internal Medicine  [address] [place] [address] [address]  [place] [address]  Phone: [phone]  Fax: [phone]  Follow Up Time: 1 month    Paras Rizo)  Cardiovascular Disease  [address] [place] [address], [address] [address]  Phone: [phone]  Fax: [phone]  Follow Up Time: 1 month

## 2020-02-21 NOTE — DISCHARGE NOTE PROVIDER - NSDCHHNEEDSERVICE_GEN_ALL_CORE
Observation and assessment/Medication teaching and assessment/Wound care and assessment Medication teaching and assessment/Observation and assessment/Teaching and training/Wound care and assessment

## 2020-02-21 NOTE — DISCHARGE NOTE PROVIDER - NSDCMRMEDTOKEN_GEN_ALL_CORE_FT
aspirin 81 mg oral tablet: 1 tab(s) orally once a day  HumaLOG Cartridge 100 units/mL subcutaneous solution:   Lipitor 10 mg oral tablet: 1 tab(s) orally once a day aspirin 325 mg oral delayed release tablet: 1 tab(s) orally once a day  atorvastatin 40 mg oral tablet: 1 tab(s) orally once a day (at bedtime)  Colace 100 mg oral capsule: 1 cap(s) orally 2 times a day   guaiFENesin 600 mg oral tablet, extended release: 1 tab(s) orally every 12 hours  HumaLOG Cartridge 100 units/mL subcutaneous solution:   K-Tab 20 mEq oral tablet, extended release: 1 tab(s) orally once a day   Lasix 40 mg oral tablet: 1 tab(s) orally once a day   metoprolol tartrate 25 mg oral tablet: 1 tab(s) orally every 8 hours  pantoprazole 40 mg oral delayed release tablet: 1 tab(s) orally once a day (before a meal)

## 2020-02-21 NOTE — PROGRESS NOTE ADULT - ASSESSMENT
PROBLEMS:  I spent 45 minutes of time examining patient, reviewing vitals, labs, medications, imaging and discussing with the team goals of care to prevent life-threatening in this patient who is at high risk for deterioration or death due to:    1.	CAD - s/p CABG - Lopressor 25, q12, lipitor 40 ,   2.	post op Pain - toradol,  3.	DM - insulin drip    PLAN  Neuro: move all 4 extremities. no sensory or motor deficits  Pain management.   ketorolac   Injectable 30 milliGRAM(s) IV Push every 6 hours PRN    Pulm: Wean off supplemental oxygen as able. Daily CXR. Encourage coughing, deep breathing and use of incentive spirometry.   guaiFENesin  milliGRAM(s) Oral every 12 hours  ipratropium    for Nebulization 500 MICROGram(s) Nebulizer every 6 hours    Cardio: Monitor telemetry/alarms. Continue supportive care   metoprolol tartrate 25 milliGRAM(s) Oral every 12 hours    GI: Continue stool softeners.    pantoprazole    Tablet 40 milliGRAM(s) Oral before breakfast  senna 1 Tablet(s) Oral every 12 hours  simethicone 80 milliGRAM(s) Chew two times a day    Nutrition: Continue present diet  Endocrine and glucose control:   atorvastatin 40 milliGRAM(s) Oral at bedtime  dextrose 40% Gel 15 Gram(s) Oral once PRN  dextrose 50% Injectable 12.5 Gram(s) IV Push once  dextrose 50% Injectable 25 Gram(s) IV Push once  glucagon  Injectable 1 milliGRAM(s) IntraMuscular once PRN  insulin regular human (HumuLIN R U-100) Pump 1 Each SubCutaneous Continuous Pump    Renal: monitor urine output, supplement electrolytes as needed,     Vasc: Heparin SC and/or SCDs for DVT prophylaxis    ID: Stable, no fever , no chills. Off antibiotics.    Tubes: Monitor Chest tube output  Therapy: OOB/ambulate  Disposition: start planing discharge home or placement    Pertinent clinical, laboratory, radiographic, hemodynamic, echocardiographic, respiratory data, microbiologic data and chart were reviewed and analyzed frequently throughout the course of the day and night. GI and DVT prophylaxis, glycemic control, head of bed elevation and skin care issues were addressed.  Patient seen, examined and plan discussed with CT Surgery / CTICU team during rounds.    [ ] The patient remains in critical and unstable condition, and requires ICU care and monitoring  [x ] The patient is improving but requires continued monitoring and adjustment of therapy

## 2020-02-21 NOTE — PHYSICAL THERAPY INITIAL EVALUATION ADULT - GENERAL OBSERVATIONS, REHAB EVAL
Pt encountered sitting in bed in chair position in NAD, +2L O2 NC, +garzon, +2 chest tubes. Pt expressed initial complaint of dizziness which resolved t/o tx session. Pt is agreeable to PT

## 2020-02-21 NOTE — DISCHARGE NOTE PROVIDER - HOSPITAL COURSE
40 y/o M with PMH of Type I DM on insulin pump for 32 years and DLD. Patient had recent admission for bacterial PNA treated with oral antibiotics 12/2019.  Patient had ct chest on that admission which demonstrated coronary calcifications prompting LHC. Patient presented for elective LHC demonstrating severe 3vCAD to LAD, cirx and RPDA w/preserved ef. He was admitted post catheterization for myocardial revascularization via CABG performed the following day. His post-op Course was 40 y/o M with PMH of Type I DM on insulin pump for 32 years and DLD. Patient had recent admission for bacterial PNA treated with oral antibiotics 12/2019.  Patient had ct chest on that admission which demonstrated coronary calcifications prompting LHC. Patient presented for elective LHC demonstrating severe 3vCAD to LAD, cirx and RPDA w/preserved ef. He was admitted post catheterization for myocardial revascularization via CABG performed the following day. Post-operatively, patient had an uneventful hospital course. He was discharged home in stable condition on POD#4 with instructions to follow up with Dr. Jenkins on 3/2/2020 @ 1:15pm.

## 2020-02-21 NOTE — DISCHARGE NOTE PROVIDER - NSDCCPCAREPLAN_GEN_ALL_CORE_FT
PRINCIPAL DISCHARGE DIAGNOSIS  Diagnosis: Coronary artery disease involving native heart with other form of angina pectoris  Assessment and Plan of Treatment:

## 2020-02-21 NOTE — DISCHARGE NOTE PROVIDER - PROVIDER TOKENS
PROVIDER:[TOKEN:[[id number]],FOLLOWUP:[1 week]],PROVIDER:[TOKEN:[[id number]],FOLLOWUP:[1 month]],PROVIDER:[TOKEN:[[id number]],FOLLOWUP:[1 month]] PROVIDER:[TOKEN:[[id number]],SCHEDULEDAPPT:[03/02/2020],SCHEDULEDAPPTTIME:[01:15 PM]],PROVIDER:[TOKEN:[[id number]],FOLLOWUP:[1 month]],PROVIDER:[TOKEN:[[id number]],FOLLOWUP:[1 month]]

## 2020-02-21 NOTE — DISCHARGE NOTE PROVIDER - NSDCACTIVITY_GEN_ALL_CORE
Do not make important decisions/Stairs allowed/Walking - Indoors allowed/No heavy lifting/straining/Do not drive or operate machinery/Walking - Outdoors allowed/Showering allowed

## 2020-02-22 LAB
ALBUMIN SERPL ELPH-MCNC: 4.3 G/DL — SIGNIFICANT CHANGE UP (ref 3.5–5.2)
ALP SERPL-CCNC: 41 U/L — SIGNIFICANT CHANGE UP (ref 30–115)
ALT FLD-CCNC: 23 U/L — SIGNIFICANT CHANGE UP (ref 0–41)
ANION GAP SERPL CALC-SCNC: 9 MMOL/L — SIGNIFICANT CHANGE UP (ref 7–14)
APTT BLD: 33.2 SEC — SIGNIFICANT CHANGE UP (ref 27–39.2)
AST SERPL-CCNC: 65 U/L — HIGH (ref 0–41)
BASOPHILS # BLD AUTO: 0.06 K/UL — SIGNIFICANT CHANGE UP (ref 0–0.2)
BASOPHILS NFR BLD AUTO: 0.7 % — SIGNIFICANT CHANGE UP (ref 0–1)
BILIRUB SERPL-MCNC: 0.5 MG/DL — SIGNIFICANT CHANGE UP (ref 0.2–1.2)
BUN SERPL-MCNC: 19 MG/DL — SIGNIFICANT CHANGE UP (ref 10–20)
CALCIUM SERPL-MCNC: 9.1 MG/DL — SIGNIFICANT CHANGE UP (ref 8.5–10.1)
CHLORIDE SERPL-SCNC: 105 MMOL/L — SIGNIFICANT CHANGE UP (ref 98–110)
CO2 SERPL-SCNC: 26 MMOL/L — SIGNIFICANT CHANGE UP (ref 17–32)
CREAT SERPL-MCNC: 0.8 MG/DL — SIGNIFICANT CHANGE UP (ref 0.7–1.5)
EOSINOPHIL # BLD AUTO: 0.11 K/UL — SIGNIFICANT CHANGE UP (ref 0–0.7)
EOSINOPHIL NFR BLD AUTO: 1.2 % — SIGNIFICANT CHANGE UP (ref 0–8)
GLUCOSE BLDC GLUCOMTR-MCNC: 100 MG/DL — HIGH (ref 70–99)
GLUCOSE BLDC GLUCOMTR-MCNC: 100 MG/DL — HIGH (ref 70–99)
GLUCOSE BLDC GLUCOMTR-MCNC: 105 MG/DL — HIGH (ref 70–99)
GLUCOSE BLDC GLUCOMTR-MCNC: 146 MG/DL — HIGH (ref 70–99)
GLUCOSE BLDC GLUCOMTR-MCNC: 151 MG/DL — HIGH (ref 70–99)
GLUCOSE BLDC GLUCOMTR-MCNC: 157 MG/DL — HIGH (ref 70–99)
GLUCOSE BLDC GLUCOMTR-MCNC: 203 MG/DL — HIGH (ref 70–99)
GLUCOSE BLDC GLUCOMTR-MCNC: 204 MG/DL — HIGH (ref 70–99)
GLUCOSE BLDC GLUCOMTR-MCNC: 205 MG/DL — HIGH (ref 70–99)
GLUCOSE BLDC GLUCOMTR-MCNC: 208 MG/DL — HIGH (ref 70–99)
GLUCOSE BLDC GLUCOMTR-MCNC: 215 MG/DL — HIGH (ref 70–99)
GLUCOSE BLDC GLUCOMTR-MCNC: 216 MG/DL — HIGH (ref 70–99)
GLUCOSE BLDC GLUCOMTR-MCNC: 237 MG/DL — HIGH (ref 70–99)
GLUCOSE BLDC GLUCOMTR-MCNC: 273 MG/DL — HIGH (ref 70–99)
GLUCOSE BLDC GLUCOMTR-MCNC: 95 MG/DL — SIGNIFICANT CHANGE UP (ref 70–99)
GLUCOSE SERPL-MCNC: 103 MG/DL — HIGH (ref 70–99)
HCT VFR BLD CALC: 33.6 % — LOW (ref 42–52)
HGB BLD-MCNC: 11.7 G/DL — LOW (ref 14–18)
IMM GRANULOCYTES NFR BLD AUTO: 0.4 % — HIGH (ref 0.1–0.3)
INR BLD: 1.36 RATIO — HIGH (ref 0.65–1.3)
LYMPHOCYTES # BLD AUTO: 1.89 K/UL — SIGNIFICANT CHANGE UP (ref 1.2–3.4)
LYMPHOCYTES # BLD AUTO: 21.1 % — SIGNIFICANT CHANGE UP (ref 20.5–51.1)
MAGNESIUM SERPL-MCNC: 2.3 MG/DL — SIGNIFICANT CHANGE UP (ref 1.8–2.4)
MCHC RBC-ENTMCNC: 29.6 PG — SIGNIFICANT CHANGE UP (ref 27–31)
MCHC RBC-ENTMCNC: 34.8 G/DL — SIGNIFICANT CHANGE UP (ref 32–37)
MCV RBC AUTO: 85.1 FL — SIGNIFICANT CHANGE UP (ref 80–94)
MONOCYTES # BLD AUTO: 0.92 K/UL — HIGH (ref 0.1–0.6)
MONOCYTES NFR BLD AUTO: 10.3 % — HIGH (ref 1.7–9.3)
NEUTROPHILS # BLD AUTO: 5.92 K/UL — SIGNIFICANT CHANGE UP (ref 1.4–6.5)
NEUTROPHILS NFR BLD AUTO: 66.3 % — SIGNIFICANT CHANGE UP (ref 42.2–75.2)
NRBC # BLD: 0 /100 WBCS — SIGNIFICANT CHANGE UP (ref 0–0)
PLATELET # BLD AUTO: 134 K/UL — SIGNIFICANT CHANGE UP (ref 130–400)
POTASSIUM SERPL-MCNC: 4.1 MMOL/L — SIGNIFICANT CHANGE UP (ref 3.5–5)
POTASSIUM SERPL-SCNC: 4.1 MMOL/L — SIGNIFICANT CHANGE UP (ref 3.5–5)
PROT SERPL-MCNC: 5.9 G/DL — LOW (ref 6–8)
PROTHROM AB SERPL-ACNC: 15.6 SEC — HIGH (ref 9.95–12.87)
RBC # BLD: 3.95 M/UL — LOW (ref 4.7–6.1)
RBC # FLD: 13.2 % — SIGNIFICANT CHANGE UP (ref 11.5–14.5)
SODIUM SERPL-SCNC: 140 MMOL/L — SIGNIFICANT CHANGE UP (ref 135–146)
WBC # BLD: 8.94 K/UL — SIGNIFICANT CHANGE UP (ref 4.8–10.8)
WBC # FLD AUTO: 8.94 K/UL — SIGNIFICANT CHANGE UP (ref 4.8–10.8)

## 2020-02-22 PROCEDURE — 71045 X-RAY EXAM CHEST 1 VIEW: CPT | Mod: 26

## 2020-02-22 PROCEDURE — 93010 ELECTROCARDIOGRAM REPORT: CPT

## 2020-02-22 PROCEDURE — 99233 SBSQ HOSP IP/OBS HIGH 50: CPT

## 2020-02-22 RX ORDER — FUROSEMIDE 40 MG
20 TABLET ORAL ONCE
Refills: 0 | Status: COMPLETED | OUTPATIENT
Start: 2020-02-22 | End: 2020-02-22

## 2020-02-22 RX ORDER — POTASSIUM CHLORIDE 20 MEQ
20 PACKET (EA) ORAL ONCE
Refills: 0 | Status: COMPLETED | OUTPATIENT
Start: 2020-02-22 | End: 2020-02-22

## 2020-02-22 RX ORDER — METOPROLOL TARTRATE 50 MG
25 TABLET ORAL EVERY 8 HOURS
Refills: 0 | Status: DISCONTINUED | OUTPATIENT
Start: 2020-02-22 | End: 2020-02-24

## 2020-02-22 RX ORDER — TAMSULOSIN HYDROCHLORIDE 0.4 MG/1
0.4 CAPSULE ORAL AT BEDTIME
Refills: 0 | Status: DISCONTINUED | OUTPATIENT
Start: 2020-02-22 | End: 2020-02-24

## 2020-02-22 RX ORDER — KETOROLAC TROMETHAMINE 30 MG/ML
30 SYRINGE (ML) INJECTION ONCE
Refills: 0 | Status: DISCONTINUED | OUTPATIENT
Start: 2020-02-22 | End: 2020-02-22

## 2020-02-22 RX ADMIN — HEPARIN SODIUM 5000 UNIT(S): 5000 INJECTION INTRAVENOUS; SUBCUTANEOUS at 14:10

## 2020-02-22 RX ADMIN — TAMSULOSIN HYDROCHLORIDE 0.4 MILLIGRAM(S): 0.4 CAPSULE ORAL at 07:03

## 2020-02-22 RX ADMIN — Medication 25 MILLIGRAM(S): at 05:51

## 2020-02-22 RX ADMIN — Medication 100 GRAM(S): at 05:51

## 2020-02-22 RX ADMIN — OXYCODONE HYDROCHLORIDE 10 MILLIGRAM(S): 5 TABLET ORAL at 06:35

## 2020-02-22 RX ADMIN — Medication 325 MILLIGRAM(S): at 11:39

## 2020-02-22 RX ADMIN — CHLORHEXIDINE GLUCONATE 1 APPLICATION(S): 213 SOLUTION TOPICAL at 05:52

## 2020-02-22 RX ADMIN — SIMETHICONE 80 MILLIGRAM(S): 80 TABLET, CHEWABLE ORAL at 18:16

## 2020-02-22 RX ADMIN — Medication 30 MILLIGRAM(S): at 23:00

## 2020-02-22 RX ADMIN — Medication 20 MILLIEQUIVALENT(S): at 10:10

## 2020-02-22 RX ADMIN — Medication 30 MILLIGRAM(S): at 17:00

## 2020-02-22 RX ADMIN — SENNA PLUS 1 TABLET(S): 8.6 TABLET ORAL at 18:16

## 2020-02-22 RX ADMIN — ATORVASTATIN CALCIUM 40 MILLIGRAM(S): 80 TABLET, FILM COATED ORAL at 22:19

## 2020-02-22 RX ADMIN — OXYCODONE HYDROCHLORIDE 10 MILLIGRAM(S): 5 TABLET ORAL at 20:21

## 2020-02-22 RX ADMIN — Medication 500 MICROGRAM(S): at 08:32

## 2020-02-22 RX ADMIN — Medication 30 MILLIGRAM(S): at 17:15

## 2020-02-22 RX ADMIN — SENNA PLUS 1 TABLET(S): 8.6 TABLET ORAL at 05:51

## 2020-02-22 RX ADMIN — Medication 25 MILLIGRAM(S): at 14:10

## 2020-02-22 RX ADMIN — PANTOPRAZOLE SODIUM 40 MILLIGRAM(S): 20 TABLET, DELAYED RELEASE ORAL at 05:51

## 2020-02-22 RX ADMIN — OXYCODONE HYDROCHLORIDE 10 MILLIGRAM(S): 5 TABLET ORAL at 21:00

## 2020-02-22 RX ADMIN — HEPARIN SODIUM 5000 UNIT(S): 5000 INJECTION INTRAVENOUS; SUBCUTANEOUS at 05:50

## 2020-02-22 RX ADMIN — Medication 25 MILLIGRAM(S): at 22:19

## 2020-02-22 RX ADMIN — HEPARIN SODIUM 5000 UNIT(S): 5000 INJECTION INTRAVENOUS; SUBCUTANEOUS at 22:19

## 2020-02-22 RX ADMIN — GABAPENTIN 300 MILLIGRAM(S): 400 CAPSULE ORAL at 05:51

## 2020-02-22 RX ADMIN — Medication 30 MILLIGRAM(S): at 06:50

## 2020-02-22 RX ADMIN — Medication 20 MILLIGRAM(S): at 10:10

## 2020-02-22 RX ADMIN — OXYCODONE HYDROCHLORIDE 10 MILLIGRAM(S): 5 TABLET ORAL at 14:45

## 2020-02-22 RX ADMIN — Medication 500 MICROGRAM(S): at 20:06

## 2020-02-22 RX ADMIN — OXYCODONE HYDROCHLORIDE 10 MILLIGRAM(S): 5 TABLET ORAL at 14:15

## 2020-02-22 RX ADMIN — Medication 600 MILLIGRAM(S): at 05:51

## 2020-02-22 RX ADMIN — Medication 30 MILLIGRAM(S): at 22:20

## 2020-02-22 RX ADMIN — Medication 100 GRAM(S): at 18:17

## 2020-02-22 RX ADMIN — POLYETHYLENE GLYCOL 3350 17 GRAM(S): 17 POWDER, FOR SOLUTION ORAL at 11:39

## 2020-02-22 RX ADMIN — Medication 30 MILLIGRAM(S): at 05:50

## 2020-02-22 RX ADMIN — SIMETHICONE 80 MILLIGRAM(S): 80 TABLET, CHEWABLE ORAL at 05:51

## 2020-02-22 RX ADMIN — TAMSULOSIN HYDROCHLORIDE 0.4 MILLIGRAM(S): 0.4 CAPSULE ORAL at 22:19

## 2020-02-22 RX ADMIN — Medication 600 MILLIGRAM(S): at 18:16

## 2020-02-22 RX ADMIN — Medication 500 MICROGRAM(S): at 13:37

## 2020-02-22 NOTE — PROGRESS NOTE ADULT - SUBJECTIVE AND OBJECTIVE BOX
CTU Attending Progress Daily Note     22 Feb 2020 12:29  Admited 02-19-20, Hospital Day 3d  POD# - 2    HPI:  40 y/o M with PMH of Type I DM on insulin pump for 32 years, DLD presented for [facility] after abnormal CT coronaries with multiple blockages     2/4/20 Calcium Score 1300.81  Severe stenosis >75% in prox, mid LAD, prox ramus intermedius  Moderate stenosis in distal circ and mod-severe stenosis in prox OM1  RPDA moderate stenosis (19 Feb 2020 09:34)    Home Medications:  aspirin 81 mg oral tablet: 1 tab(s) orally once a day (19 Feb 2020 09:42)  HumaLOG Cartridge 100 units/mL subcutaneous solution:  (19 Feb 2020 09:42)  Lipitor 10 mg oral tablet: 1 tab(s) orally once a day (19 Feb 2020 09:42)    FAMILY HISTORY:  No pertinent family history in first degree relatives    PAST MEDICAL & SURGICAL HISTORY:  Hyperlipidemia  Diabetes, type I  History of trigger finger    Interval event for past 24 hr:  LIZ ALVARADO  39y had no event.   Current Complains:  LIZ ALVARADO has no new complains  Allergies    No Known Allergies    Intolerances      OBJECTIVE:  Vitals last 24 hrs  T(C): 36.8 (02-22-20 @ 12:00), Max: 37.1 (02-21-20 @ 13:00)  T(F): 98.3 (02-22-20 @ 12:00), Max: 98.8 (02-21-20 @ 13:00)  HR: 109 (02-22-20 @ 12:00) (77 - 109)  BP: 116/72 (02-22-20 @ 12:00) (96/61 - 137/78)  ABP: 96/53 (02-21-20 @ 13:15) (96/53 - 111/63)  ABP(mean): 67 (02-21-20 @ 13:15) (67 - 77)  RR: 21 (02-22-20 @ 12:00) (12 - 28)  SpO2: 97% (02-22-20 @ 12:00) (95% - 100%)      02-21-20 @ 07:01  -  02-22-20 @ 07:00  --------------------------------------------------------  IN:    insulin regular Infusion: 77 mL    IV PiggyBack: 250 mL    niCARdipine Infusion: 15 mL    nitroglycerin  Infusion: 135 mL    Oral Fluid: 780 mL    sodium chloride 0.9%.: 460 mL  Total IN: 1717 mL    OUT:    Chest Tube: 120 mL    Chest Tube: 110 mL    Indwelling Catheter - Urethral: 182 mL    Intermittent Catheterization - Urethral: 250 mL    Voided: 25 mL  Total OUT: 687 mL    Total NET: 1030 mL          CAPILLARY BLOOD GLUCOSE      POCT Blood Glucose.: 203 mg/dL (22 Feb 2020 12:07)  POCT Blood Glucose.: 205 mg/dL (22 Feb 2020 11:07)  POCT Blood Glucose.: 204 mg/dL (22 Feb 2020 10:13)  POCT Blood Glucose.: 237 mg/dL (22 Feb 2020 09:11)  POCT Blood Glucose.: 208 mg/dL (22 Feb 2020 08:18)  POCT Blood Glucose.: 157 mg/dL (22 Feb 2020 06:39)  POCT Blood Glucose.: 100 mg/dL (22 Feb 2020 04:11)  POCT Blood Glucose.: 100 mg/dL (22 Feb 2020 02:41)  POCT Blood Glucose.: 146 mg/dL (22 Feb 2020 00:13)  POCT Blood Glucose.: 118 mg/dL (21 Feb 2020 21:06)  POCT Blood Glucose.: 153 mg/dL (21 Feb 2020 18:43)  POCT Blood Glucose.: 141 mg/dL (21 Feb 2020 17:23)  POCT Blood Glucose.: 129 mg/dL (21 Feb 2020 15:25)  POCT Blood Glucose.: 142 mg/dL (21 Feb 2020 14:21)  POCT Blood Glucose.: 131 mg/dL (21 Feb 2020 13:21)    LABS:  ABG - ( 21 Feb 2020 03:44 )  pH, Arterial: 7.35  pH, Blood: x     /  pCO2: 40    /  pO2: 97    / HCO3: 22    / Base Excess: -3.3  /  SaO2: 98                                      11.7   8.94  )-----------( 134      ( 22 Feb 2020 02:00 )             33.6     Hemoglobin: 11.7 g/dL (02-22 @ 02:00)  Hemoglobin: 10.9 g/dL (02-21 @ 02:00)  Hemoglobin: 12.4 g/dL (02-20 @ 15:34)  Hemoglobin: 11.5 g/dL (02-20 @ 13:40)  Hemoglobin: 15.1 g/dL (02-19 @ 13:22)    02-22    140  |  105  |  19  ----------------------------<  103<H>  4.1   |  26  |  0.8    Ca    9.1      22 Feb 2020 02:00  Mg     2.3     02-22    TPro  5.9<L>  /  Alb  4.3  /  TBili  0.5  /  DBili  x   /  AST  65<H>  /  ALT  23  /  AlkPhos  41  02-22    Creatinine, Serum: 0.8 mg/dL (02-22 @ 02:00)  Creatinine, Serum: 0.8 mg/dL (02-21 @ 05:00)  Creatinine, Serum: 0.8 mg/dL (02-21 @ 02:00)  Creatinine, Serum: 0.9 mg/dL (02-20 @ 15:34)  Creatinine, Serum: 0.9 mg/dL (02-20 @ 01:00)  Creatinine, Serum: 0.7 mg/dL (02-19 @ 13:22)  Creatinine, Serum: 0.8 mg/dL (02-19 @ 10:16)    PT/INR - ( 22 Feb 2020 02:00 )   PT: 15.60 sec;   INR: 1.36 ratio     PTT - ( 22 Feb 2020 02:00 )  PTT:33.2 sec      HOSPITAL MEDICATIONS:  MEDICATIONS  (STANDING):  aspirin enteric coated 325 milliGRAM(s) Oral daily  atorvastatin 40 milliGRAM(s) Oral at bedtime  chlorhexidine 4% Liquid 1 Application(s) Topical <User Schedule>  dextrose 5%. 1000 milliLiter(s) (50 mL/Hr) IV Continuous <Continuous>  dextrose 50% Injectable 12.5 Gram(s) IV Push once  dextrose 50% Injectable 25 Gram(s) IV Push once  dextrose 50% Injectable 50 milliLiter(s) IV Push every 15 minutes  dextrose 50% Injectable 25 milliLiter(s) IV Push every 15 minutes  guaiFENesin  milliGRAM(s) Oral every 12 hours  heparin  Injectable 5000 Unit(s) SubCutaneous every 8 hours  insulin regular human (HumuLIN R U-100) Pump 1 Each SubCutaneous Continuous Pump  insulin regular Infusion 1 Unit(s)/Hr (1 mL/Hr) IV Continuous <Continuous>  ipratropium    for Nebulization 500 MICROGram(s) Nebulizer every 6 hours  magnesium sulfate  IVPB 1 Gram(s) IV Intermittent every 12 hours  meperidine     Injectable 25 milliGRAM(s) IV Push once  metoprolol tartrate 25 milliGRAM(s) Oral every 8 hours  niCARdipine Infusion 5 mG/Hr (25 mL/Hr) IV Continuous <Continuous>  nitroglycerin  Infusion 5 MICROgram(s)/Min (1.5 mL/Hr) IV Continuous <Continuous>  pantoprazole    Tablet 40 milliGRAM(s) Oral before breakfast  polyethylene glycol 3350 17 Gram(s) Oral daily  senna 1 Tablet(s) Oral every 12 hours  simethicone 80 milliGRAM(s) Chew two times a day  sodium chloride 0.9%. 1000 milliLiter(s) (20 mL/Hr) IV Continuous <Continuous>  tamsulosin 0.4 milliGRAM(s) Oral at bedtime    MEDICATIONS  (PRN):  dextrose 40% Gel 15 Gram(s) Oral once PRN Blood Glucose LESS THAN 70 milliGRAM(s)/deciliter  glucagon  Injectable 1 milliGRAM(s) IntraMuscular once PRN Glucose LESS THAN 70 milligrams/deciliter  oxyCODONE    IR 10 milliGRAM(s) Oral every 4 hours PRN Severe Pain (7 - 10)      REVIEW OF SYSTEMS:  CONSTITUTIONAL: [X] all negative; [ ] weakness, [ ] fevers, [ ] chills  EYES/ENT: [X] all negative; [ ] visual changes, [ ] vertigo, [ ] throat pain, [ ] eye pain  NECK: [X] all negative; [ ] pain, [ ] stiffness  RESPIRATORY: [ ] all negative, [x ] cough, [ ] wheezing, [ ] hemoptysis, [x ] shortness of breath, [ x ] chest pain  CARDIOVASCULAR: [x ] all negative; [ ] anginal chest pain, [ ] palpitations, [ ] orthopnea  GASTROINTESTINAL: [X] all negative; [ ]abdominal pain, [ ] nausea, [ ] vomiting, [ ] hematemesis, [ ] diarrhea, [ ] constipation, [ ] melena, [ ] hematochezia.  GENITOURINARY: [X] all negative; [ ] dysuria, [ ] frequency, [ ] hematuria  NEUROLOGICAL: [X] all negative; [ ] numbness, [ ] weakness, [ ] paresthesias  MUSCULOSKELETAL: [X] all negative, [ ] joint pain, [ ] joint swelling, [ ] joint redness, [ ] bone pain  SKIN: [X] all negative; [ ] itching, [ ] burning, [ ] rashes, [ ] lesions   All other review of systems is negative unless indicated above.    [  ] Unable to assess ROS because     PHYSICAL EXAM:          CONSTITUTIONAL: Well-developed; well-nourished; in no acute distress.   	SKIN: warm, dry, no rashes or lesions  	HEENT: Atraumatic. Normocephalic. PERRL. Moist membranes, no conjunctival injection, sclera clear  	NECK: Supple; non tender.  No JVD. No lymphadenopathy.  	CARD: Normal S1, S2. Rate and Rhythm are regular. No murmurs.  	RESP: Good air entry bilaterally, no wheezes, no rales no rhonchi.  	ABD: Soft, not tender, not distended, no CVA tendernass, no rebound no guarding, bowel sounds present  	EXT: Normal ROM.  No clubbing, no cyanosis, no pedal edema, no calf pain b/l, Peripheral pulses intact.  	LYMPH: No acute cervical adenopathy.  	NEURO: Alert, awake, motor 5/5 R, 5/5 L, sensation intact bilat, CN 2-12 intact,          PSYCH: Cooperative, appropriate. Alert & oriented x 3    RADIOLOGY:  X Reviewed and interpreted by me  CxR from 02-22-20 shows mild congestion, left pneumothorax - decreasing in size , no effusion, no cardiomegaly,       ECG:  X Reviewed and interpreted by me , QTc - 408

## 2020-02-22 NOTE — PROGRESS NOTE ADULT - SUBJECTIVE AND OBJECTIVE BOX
OPERATIVE PROCEDURE(s):    cabgx3            POD #     2                  39yMale  SURGEON(s): MARITZA Jenkins  SUBJECTIVE ASSESSMENT: pt seen and examined. no acute complaints at this time.   Vital Signs Last 24 Hrs  T(F): 98.3 (2020 12:00), Max: 98.5 (2020 08:20)  HR: 109 (2020 12:00) (77 - 109)  BP: 116/72 (2020 12:00) (96/61 - 137/78)  BP(mean): 89 (2020 12:00) (73 - 101)--  RR: 21 (2020 12:00) (12 - 28)  SpO2: 97% (2020 12:00) (95% - 100%)      I&O's Detail    2020 07:  -  2020 07:00  --------------------------------------------------------  IN:    insulin regular Infusion: 77 mL    IV PiggyBack: 250 mL    niCARdipine Infusion: 15 mL    nitroglycerin  Infusion: 135 mL    Oral Fluid: 780 mL    sodium chloride 0.9%.: 460 mL  Total IN: 1717 mL    OUT:    Chest Tube: 120 mL    Chest Tube: 110 mL    Indwelling Catheter - Urethral: 182 mL    Intermittent Catheterization - Urethral: 250 mL    Voided: 25 mL  Total OUT: 687 mL        Net:   I&O's Detail    2020 07:  -  2020 07:00  --------------------------------------------------------  Total NET: 1165.9 mL      2020 07:  -  2020 07:00  --------------------------------------------------------  Total NET: 1030 mL        CAPILLARY BLOOD GLUCOSE      POCT Blood Glucose.: 151 mg/dL (2020 13:07)  POCT Blood Glucose.: 203 mg/dL (2020 12:07)  POCT Blood Glucose.: 205 mg/dL (2020 11:07)  POCT Blood Glucose.: 204 mg/dL (2020 10:13)  POCT Blood Glucose.: 237 mg/dL (2020 09:11)  POCT Blood Glucose.: 208 mg/dL (2020 08:18)  POCT Blood Glucose.: 157 mg/dL (2020 06:39)  POCT Blood Glucose.: 100 mg/dL (2020 04:11)  POCT Blood Glucose.: 100 mg/dL (2020 02:41)  POCT Blood Glucose.: 146 mg/dL (2020 00:13)  POCT Blood Glucose.: 118 mg/dL (2020 21:06)  POCT Blood Glucose.: 153 mg/dL (2020 18:43)  POCT Blood Glucose.: 141 mg/dL (2020 17:23)  POCT Blood Glucose.: 129 mg/dL (2020 15:25)  POCT Blood Glucose.: 142 mg/dL (2020 14:21)  POCT Blood Glucose.: 131 mg/dL (2020 13:21)    Physical Exam:  General: NAD; A&Ox3  Cardiac: S1/S2, RRR, no murmur, no rubs  Lungs: decreased bs at bases bilaterally  Abdomen: Soft/NT/ND; positive bowel sounds x 4  Sternum: Intact, no click, incision healing well with no drainage  Incisions: Incisions clean/dry/intact  Extremities: No edema b/l lower extremities; good capillary refill; no cyanosis; palpable 1+ pedal pulses b/l    Central Venous Catheter: Yes[x]  No[] , If Yes indication:  access         Day #2  Lin Catheter: Yes  [] , No  [x] , If yes indication:                      Day #  NGT: Yes [] No [x] ,    If Yes Placement:                                     Day #  EPICARDIAL WIRES:  [x] YES [] NO                                              Day #2  BOWEL MOVEMENT:  [] YES [x] NO, If No, Timing since last BM:      Day #  CHEST TUBE(Left/Right):  [] YES [x] NO, If yes -  AIR LEAKS:  [] YES [] NO        LABS:                        11.7<L>  8.94  )-----------( 134      ( 2020 02:00 )             33.6<L>                        10.9<L>  8.22  )-----------( 115<L>    ( 2020 02:00 )             31.8<L>    02    140  |  105  |  19  ----------------------------<  103<H>  4.1   |  26  |  0.8      134<L>  |  102  |  13  ----------------------------<  244<H>  4.4   |  20  |  0.8    Ca    9.1      2020 02:00  Mg     2.3         TPro  5.9<L> [6.0 - 8.0]  /  Alb  4.3 [3.5 - 5.2]  /  TBili  0.5 [0.2 - 1.2]  /  DBili  x   /  AST  65<H> [0 - 41]  /  ALT  23 [0 - 41]  /  AlkPhos  41 [30 - 115]  02-22    PT/INR - ( 2020 02:00 )   PT: ;   INR: 1.36 ratio         PT/INR - ( 2020 02:00 )   PT: ;   INR: 1.28 ratio         PTT - ( 2020 02:00 )  PTT:33.2 sec, PTT - ( 2020 02:00 )  PTT:31.7 sec  Urinalysis Basic - ( 2020 01:00 )    Color: Light Yellow / Appearance: Clear / S.027 / pH: x  Gluc: x / Ketone: Negative  / Bili: Negative / Urobili: <2 mg/dL   Blood: x / Protein: Negative / Nitrite: Negative   Leuk Esterase: Negative / RBC: x / WBC x   Sq Epi: x / Non Sq Epi: x / Bacteria: x      ABG - ( 2020 03:44 )  pH: 7.35  /  pCO2: 40    /  pO2: 97    / HCO3: 22    / Base Excess: -3.3  /  SaO2: 98    /  LA: 0.7              RADIOLOGY & ADDITIONAL TESTS:  CXR: < from: Xray Chest 1 View-PORTABLE IMMEDIATE (20 @ 21:38) >  Impression:      Compared with the previous portable chest x-ray at 2:50 PM, this been no significant interval change in the left pneumothorax. The air gap remains at approximately 1.9 cm.    < end of copied text >    EKG:  MEDICATIONS  (STANDING):  aspirin enteric coated 325 milliGRAM(s) Oral daily  atorvastatin 40 milliGRAM(s) Oral at bedtime  chlorhexidine 4% Liquid 1 Application(s) Topical <User Schedule>  dextrose 5%. 1000 milliLiter(s) (50 mL/Hr) IV Continuous <Continuous>  dextrose 50% Injectable 12.5 Gram(s) IV Push once  dextrose 50% Injectable 25 Gram(s) IV Push once  dextrose 50% Injectable 50 milliLiter(s) IV Push every 15 minutes  dextrose 50% Injectable 25 milliLiter(s) IV Push every 15 minutes  guaiFENesin  milliGRAM(s) Oral every 12 hours  heparin  Injectable 5000 Unit(s) SubCutaneous every 8 hours  insulin regular human (HumuLIN R U-100) Pump 1 Each SubCutaneous Continuous Pump  insulin regular Infusion 1 Unit(s)/Hr (1 mL/Hr) IV Continuous <Continuous>  ipratropium    for Nebulization 500 MICROGram(s) Nebulizer every 6 hours  magnesium sulfate  IVPB 1 Gram(s) IV Intermittent every 12 hours  meperidine     Injectable 25 milliGRAM(s) IV Push once  metoprolol tartrate 25 milliGRAM(s) Oral every 8 hours  niCARdipine Infusion 5 mG/Hr (25 mL/Hr) IV Continuous <Continuous>  nitroglycerin  Infusion 5 MICROgram(s)/Min (1.5 mL/Hr) IV Continuous <Continuous>  pantoprazole    Tablet 40 milliGRAM(s) Oral before breakfast  polyethylene glycol 3350 17 Gram(s) Oral daily  senna 1 Tablet(s) Oral every 12 hours  simethicone 80 milliGRAM(s) Chew two times a day  sodium chloride 0.9%. 1000 milliLiter(s) (20 mL/Hr) IV Continuous <Continuous>  tamsulosin 0.4 milliGRAM(s) Oral at bedtime    MEDICATIONS  (PRN):  dextrose 40% Gel 15 Gram(s) Oral once PRN Blood Glucose LESS THAN 70 milliGRAM(s)/deciliter  glucagon  Injectable 1 milliGRAM(s) IntraMuscular once PRN Glucose LESS THAN 70 milligrams/deciliter  oxyCODONE    IR 10 milliGRAM(s) Oral every 4 hours PRN Severe Pain (7 - 10)    HEPARIN:  [x] YES [] NO  Dose: 5000 UNITS Q8H  LOVENOX:[] YES [x] NO  Dose: XX mg Q24H  COUMADIN: []  YES [x] NO  Dose: XX mg  Q24H  SCD's: YES b/l  GI Prophylaxis: Protonix [x], Pepcid [], None [], (Contra-indication:.....)    Post-Op Beta-Blockers: Yes [x], No[], If No, then contraindication:  Post-Op Aspirin: Yes [x],  No [], If No, then contraindication:  Post-Op Statin: Yes [x], No[], If No, then contraindication:  Allergies    No Known Allergies    Intolerances      Ambulation/Activity Status: ambulate     Assessment/Plan:  39y Male status-post CABGx3 POD#2  - Case and plan discussed with CTU Intensivist and CT Surgeon - Dr. Haas/Siri/Alice   - Continue CTU supportive care    - Continue DVT/GI prophylaxis  - Incentive Spirometry 10 times an hour  - Continue to advance physical activity as tolerated and continue PT/OT as directed  1. CAD: Continue ASA, statin, BB, pain control as needed  2. HTN: increase lopressor to 25mg q 8 hours  3. A. Fib prophylaxis: mag 1gm bid, lopressor  4. COPD/Hypoxia: cont nebs, wean o2 as tolerated, mucinex, lasix for fluid overload, encourage incentive spirometry  5. DM/Glucose Control: d/c insulin gtt, restart insulin pump  6. small left ptx: stable cont to monitor    Social Service Disposition:  home

## 2020-02-22 NOTE — PROGRESS NOTE ADULT - ASSESSMENT
PROBLEMS:  I spent 45 minutes of time examining patient, reviewing vitals, labs, medications, imaging and discussing with the team goals of care to prevent life-threatening in this patient who is at high risk for deterioration or death due to:    1.	CAD - s/p CABG - Lopressor 25, q8, lipitor 40 ,   2.	pneumothorax - improving - observe   3.	post op Pain - Toradol  4.	DM - insulin drip - change to insulin pump  5.	Urinary retention - Flomax observe  6.	fluid overload - lasix 20 IV with KCl - 20    PLAN  Neuro: move all 4 extremities. no sensory or motor deficits  Pain management.     Pulm: Wean off supplemental oxygen as able. Daily CXR. Encourage coughing, deep breathing and use of incentive spirometry.   guaiFENesin  milliGRAM(s) Oral every 12 hours  ipratropium    for Nebulization 500 MICROGram(s) Nebulizer every 6 hours    Cardio: Monitor telemetry/alarms. Continue supportive care   metoprolol tartrate 25 milliGRAM(s) Oral every 8 hours  tamsulosin 0.4 milliGRAM(s) Oral at bedtime    GI: Continue stool softeners.    pantoprazole    Tablet 40 milliGRAM(s) Oral before breakfast  senna 1 Tablet(s) Oral every 12 hours  simethicone 80 milliGRAM(s) Chew two times a day    Nutrition: Continue present diet  Endocrine and glucose control:   atorvastatin 40 milliGRAM(s) Oral at bedtime  dextrose 40% Gel 15 Gram(s) Oral once PRN  dextrose 50% Injectable 12.5 Gram(s) IV Push once  dextrose 50% Injectable 25 Gram(s) IV Push once  glucagon  Injectable 1 milliGRAM(s) IntraMuscular once PRN  insulin regular human (HumuLIN R U-100) Pump 1 Each SubCutaneous Continuous Pump    Renal: monitor urine output, supplement electrolytes as needed,     Vasc: Heparin SC and/or SCDs for DVT prophylaxis  heparin  Injectable 5000 Unit(s) SubCutaneous every 8 hours    ID: Stable, no fever , no chills. Off antibiotics.      Therapy: OOB/ambulate  Disposition: start planing discharge home or placement    Pertinent clinical, laboratory, radiographic, hemodynamic, echocardiographic, respiratory data, microbiologic data and chart were reviewed and analyzed frequently throughout the course of the day and night. GI and DVT prophylaxis, glycemic control, head of bed elevation and skin care issues were addressed.  Patient seen, examined and plan discussed with CT Surgery / CTICU team during rounds.    [ ] The patient remains in critical and unstable condition, and requires ICU care and monitoring  [x ] The patient is improving but requires continued monitoring and adjustment of therapy

## 2020-02-23 LAB
ALBUMIN SERPL ELPH-MCNC: 3.8 G/DL — SIGNIFICANT CHANGE UP (ref 3.5–5.2)
ALP SERPL-CCNC: 44 U/L — SIGNIFICANT CHANGE UP (ref 30–115)
ALT FLD-CCNC: 24 U/L — SIGNIFICANT CHANGE UP (ref 0–41)
ANION GAP SERPL CALC-SCNC: 11 MMOL/L — SIGNIFICANT CHANGE UP (ref 7–14)
AST SERPL-CCNC: 50 U/L — HIGH (ref 0–41)
BASOPHILS # BLD AUTO: 0.07 K/UL — SIGNIFICANT CHANGE UP (ref 0–0.2)
BASOPHILS NFR BLD AUTO: 0.8 % — SIGNIFICANT CHANGE UP (ref 0–1)
BILIRUB SERPL-MCNC: 0.7 MG/DL — SIGNIFICANT CHANGE UP (ref 0.2–1.2)
BUN SERPL-MCNC: 19 MG/DL — SIGNIFICANT CHANGE UP (ref 10–20)
CALCIUM SERPL-MCNC: 8.9 MG/DL — SIGNIFICANT CHANGE UP (ref 8.5–10.1)
CHLORIDE SERPL-SCNC: 102 MMOL/L — SIGNIFICANT CHANGE UP (ref 98–110)
CO2 SERPL-SCNC: 25 MMOL/L — SIGNIFICANT CHANGE UP (ref 17–32)
CREAT SERPL-MCNC: 0.8 MG/DL — SIGNIFICANT CHANGE UP (ref 0.7–1.5)
EOSINOPHIL # BLD AUTO: 0.2 K/UL — SIGNIFICANT CHANGE UP (ref 0–0.7)
EOSINOPHIL NFR BLD AUTO: 2.3 % — SIGNIFICANT CHANGE UP (ref 0–8)
GLUCOSE BLDC GLUCOMTR-MCNC: 118 MG/DL — HIGH (ref 70–99)
GLUCOSE BLDC GLUCOMTR-MCNC: 196 MG/DL — HIGH (ref 70–99)
GLUCOSE BLDC GLUCOMTR-MCNC: 217 MG/DL — HIGH (ref 70–99)
GLUCOSE BLDC GLUCOMTR-MCNC: 253 MG/DL — HIGH (ref 70–99)
GLUCOSE SERPL-MCNC: 129 MG/DL — HIGH (ref 70–99)
HCT VFR BLD CALC: 33.8 % — LOW (ref 42–52)
HGB BLD-MCNC: 11.7 G/DL — LOW (ref 14–18)
IMM GRANULOCYTES NFR BLD AUTO: 0.3 % — SIGNIFICANT CHANGE UP (ref 0.1–0.3)
LYMPHOCYTES # BLD AUTO: 2.54 K/UL — SIGNIFICANT CHANGE UP (ref 1.2–3.4)
LYMPHOCYTES # BLD AUTO: 29 % — SIGNIFICANT CHANGE UP (ref 20.5–51.1)
MAGNESIUM SERPL-MCNC: 1.9 MG/DL — SIGNIFICANT CHANGE UP (ref 1.8–2.4)
MCHC RBC-ENTMCNC: 29.2 PG — SIGNIFICANT CHANGE UP (ref 27–31)
MCHC RBC-ENTMCNC: 34.6 G/DL — SIGNIFICANT CHANGE UP (ref 32–37)
MCV RBC AUTO: 84.3 FL — SIGNIFICANT CHANGE UP (ref 80–94)
MONOCYTES # BLD AUTO: 1.09 K/UL — HIGH (ref 0.1–0.6)
MONOCYTES NFR BLD AUTO: 12.4 % — HIGH (ref 1.7–9.3)
NEUTROPHILS # BLD AUTO: 4.84 K/UL — SIGNIFICANT CHANGE UP (ref 1.4–6.5)
NEUTROPHILS NFR BLD AUTO: 55.2 % — SIGNIFICANT CHANGE UP (ref 42.2–75.2)
NRBC # BLD: 0 /100 WBCS — SIGNIFICANT CHANGE UP (ref 0–0)
PLATELET # BLD AUTO: 167 K/UL — SIGNIFICANT CHANGE UP (ref 130–400)
POTASSIUM SERPL-MCNC: 4.3 MMOL/L — SIGNIFICANT CHANGE UP (ref 3.5–5)
POTASSIUM SERPL-SCNC: 4.3 MMOL/L — SIGNIFICANT CHANGE UP (ref 3.5–5)
PROT SERPL-MCNC: 5.8 G/DL — LOW (ref 6–8)
RBC # BLD: 4.01 M/UL — LOW (ref 4.7–6.1)
RBC # FLD: 12.7 % — SIGNIFICANT CHANGE UP (ref 11.5–14.5)
SODIUM SERPL-SCNC: 138 MMOL/L — SIGNIFICANT CHANGE UP (ref 135–146)
WBC # BLD: 8.77 K/UL — SIGNIFICANT CHANGE UP (ref 4.8–10.8)
WBC # FLD AUTO: 8.77 K/UL — SIGNIFICANT CHANGE UP (ref 4.8–10.8)

## 2020-02-23 PROCEDURE — 71045 X-RAY EXAM CHEST 1 VIEW: CPT | Mod: 26

## 2020-02-23 PROCEDURE — 99232 SBSQ HOSP IP/OBS MODERATE 35: CPT

## 2020-02-23 PROCEDURE — 93010 ELECTROCARDIOGRAM REPORT: CPT

## 2020-02-23 RX ORDER — FUROSEMIDE 40 MG
20 TABLET ORAL ONCE
Refills: 0 | Status: COMPLETED | OUTPATIENT
Start: 2020-02-23 | End: 2020-02-23

## 2020-02-23 RX ORDER — ACETAMINOPHEN 500 MG
650 TABLET ORAL EVERY 6 HOURS
Refills: 0 | Status: DISCONTINUED | OUTPATIENT
Start: 2020-02-23 | End: 2020-02-24

## 2020-02-23 RX ORDER — KETOROLAC TROMETHAMINE 30 MG/ML
30 SYRINGE (ML) INJECTION ONCE
Refills: 0 | Status: DISCONTINUED | OUTPATIENT
Start: 2020-02-23 | End: 2020-02-22

## 2020-02-23 RX ORDER — KETOROLAC TROMETHAMINE 30 MG/ML
30 SYRINGE (ML) INJECTION ONCE
Refills: 0 | Status: DISCONTINUED | OUTPATIENT
Start: 2020-02-23 | End: 2020-02-23

## 2020-02-23 RX ADMIN — Medication 500 MICROGRAM(S): at 13:36

## 2020-02-23 RX ADMIN — OXYCODONE HYDROCHLORIDE 10 MILLIGRAM(S): 5 TABLET ORAL at 06:25

## 2020-02-23 RX ADMIN — POLYETHYLENE GLYCOL 3350 17 GRAM(S): 17 POWDER, FOR SOLUTION ORAL at 11:31

## 2020-02-23 RX ADMIN — ATORVASTATIN CALCIUM 40 MILLIGRAM(S): 80 TABLET, FILM COATED ORAL at 21:24

## 2020-02-23 RX ADMIN — SIMETHICONE 80 MILLIGRAM(S): 80 TABLET, CHEWABLE ORAL at 18:23

## 2020-02-23 RX ADMIN — OXYCODONE HYDROCHLORIDE 10 MILLIGRAM(S): 5 TABLET ORAL at 07:00

## 2020-02-23 RX ADMIN — OXYCODONE HYDROCHLORIDE 10 MILLIGRAM(S): 5 TABLET ORAL at 21:00

## 2020-02-23 RX ADMIN — TAMSULOSIN HYDROCHLORIDE 0.4 MILLIGRAM(S): 0.4 CAPSULE ORAL at 21:24

## 2020-02-23 RX ADMIN — Medication 20 MILLIGRAM(S): at 10:49

## 2020-02-23 RX ADMIN — SIMETHICONE 80 MILLIGRAM(S): 80 TABLET, CHEWABLE ORAL at 06:13

## 2020-02-23 RX ADMIN — Medication 25 MILLIGRAM(S): at 06:12

## 2020-02-23 RX ADMIN — Medication 100 GRAM(S): at 18:22

## 2020-02-23 RX ADMIN — OXYCODONE HYDROCHLORIDE 10 MILLIGRAM(S): 5 TABLET ORAL at 20:35

## 2020-02-23 RX ADMIN — HEPARIN SODIUM 5000 UNIT(S): 5000 INJECTION INTRAVENOUS; SUBCUTANEOUS at 06:13

## 2020-02-23 RX ADMIN — Medication 600 MILLIGRAM(S): at 06:12

## 2020-02-23 RX ADMIN — Medication 30 MILLIGRAM(S): at 07:45

## 2020-02-23 RX ADMIN — Medication 650 MILLIGRAM(S): at 21:31

## 2020-02-23 RX ADMIN — Medication 600 MILLIGRAM(S): at 18:23

## 2020-02-23 RX ADMIN — Medication 500 MICROGRAM(S): at 08:12

## 2020-02-23 RX ADMIN — Medication 100 GRAM(S): at 06:13

## 2020-02-23 RX ADMIN — HEPARIN SODIUM 5000 UNIT(S): 5000 INJECTION INTRAVENOUS; SUBCUTANEOUS at 21:24

## 2020-02-23 RX ADMIN — OXYCODONE HYDROCHLORIDE 10 MILLIGRAM(S): 5 TABLET ORAL at 02:30

## 2020-02-23 RX ADMIN — Medication 650 MILLIGRAM(S): at 14:10

## 2020-02-23 RX ADMIN — Medication 650 MILLIGRAM(S): at 15:00

## 2020-02-23 RX ADMIN — Medication 325 MILLIGRAM(S): at 11:31

## 2020-02-23 RX ADMIN — HEPARIN SODIUM 5000 UNIT(S): 5000 INJECTION INTRAVENOUS; SUBCUTANEOUS at 13:00

## 2020-02-23 RX ADMIN — OXYCODONE HYDROCHLORIDE 10 MILLIGRAM(S): 5 TABLET ORAL at 03:00

## 2020-02-23 RX ADMIN — OXYCODONE HYDROCHLORIDE 10 MILLIGRAM(S): 5 TABLET ORAL at 14:10

## 2020-02-23 RX ADMIN — Medication 650 MILLIGRAM(S): at 21:30

## 2020-02-23 RX ADMIN — Medication 25 MILLIGRAM(S): at 21:24

## 2020-02-23 RX ADMIN — SENNA PLUS 1 TABLET(S): 8.6 TABLET ORAL at 06:12

## 2020-02-23 RX ADMIN — Medication 30 MILLIGRAM(S): at 08:15

## 2020-02-23 RX ADMIN — PANTOPRAZOLE SODIUM 40 MILLIGRAM(S): 20 TABLET, DELAYED RELEASE ORAL at 06:13

## 2020-02-23 RX ADMIN — OXYCODONE HYDROCHLORIDE 10 MILLIGRAM(S): 5 TABLET ORAL at 13:01

## 2020-02-23 RX ADMIN — Medication 25 MILLIGRAM(S): at 13:00

## 2020-02-23 RX ADMIN — SENNA PLUS 1 TABLET(S): 8.6 TABLET ORAL at 18:22

## 2020-02-23 NOTE — PROGRESS NOTE ADULT - SUBJECTIVE AND OBJECTIVE BOX
LIZ ALVARADO  MRN#: [id number]  Subjective:  Patient was seen and evalauted on AM rounds     OBJECTIVE:  ICU Vital Signs Last 24 Hrs  T(C): 36.7 (23 Feb 2020 08:00), Max: 38.1 (22 Feb 2020 16:45)  T(F): 98 (23 Feb 2020 08:00), Max: 100.5 (22 Feb 2020 16:45)  HR: 105 (23 Feb 2020 12:00) (93 - 113)  BP: 105/70 (23 Feb 2020 12:00) (90/54 - 126/77)  BP(mean): 81 (23 Feb 2020 12:00) (67 - 777)  ABP: --  ABP(mean): --  RR: 20 (23 Feb 2020 06:00) (15 - 23)  SpO2: 98% (23 Feb 2020 10:05) (94% - 99%)      02-22 @ 07:01  -  02-23 @ 07:00  --------------------------------------------------------  IN: 1615 mL / OUT: 1475 mL / NET: 140 mL    02-23 @ 07:01  -  02-23 @ 12:47  --------------------------------------------------------  IN: 240 mL / OUT: 0 mL / NET: 240 mL      CAPILLARY BLOOD GLUCOSE  118 (23 Feb 2020 06:00)      POCT Blood Glucose.: 217 mg/dL (23 Feb 2020 10:53)      PHYSICAL EXAM:Daily     Daily   General: WN/WD NAD    HEENT:     + NCAT  + EOMI  - Conjuctival edema   - Icterus   - Thrush   - ETT  - NGT/OGT    Neck:         + FROM    - JVD     - Nodes     - Masses    + Mid-line trachea   - Tracheostomy    Chest:         - Sternal click  - Sternal drainage  + Pacing wires  - Chest tubes  - SubQ emphysema    Lungs:          + CTA   - Rhonchi    - Rales    - Wheezing     - Decreased BS   - Dullness R L    Cardiac:       + S1 + S2    + RRR   - Irregular   - S3  - S4    - Murmurs   - Rub   - Hamman’s sign     Abdomen:    + BS     + Soft    + Non-tender     - Distended    - Organomegaly  - PEG    Extremities:   - Cyanosis U/L   - Clubbing  U/L  - LE/UE Edema   + Capillary refill    + Pulses     Neuro:        + Awake   +  Alert   - Confused   - Lethargic   - Sedated   - Generalized Weakness    Skin:        - Rashes    - Erythema   + Normal incisions   + IV sites intact  - Sacral decubitus    HOSPITAL MEDICATIONS:  MEDICATIONS  (STANDING):  aspirin enteric coated 325 milliGRAM(s) Oral daily  atorvastatin 40 milliGRAM(s) Oral at bedtime  chlorhexidine 4% Liquid 1 Application(s) Topical <User Schedule>  dextrose 5%. 1000 milliLiter(s) (50 mL/Hr) IV Continuous <Continuous>  dextrose 50% Injectable 12.5 Gram(s) IV Push once  dextrose 50% Injectable 25 Gram(s) IV Push once  dextrose 50% Injectable 50 milliLiter(s) IV Push every 15 minutes  dextrose 50% Injectable 25 milliLiter(s) IV Push every 15 minutes  guaiFENesin  milliGRAM(s) Oral every 12 hours  heparin  Injectable 5000 Unit(s) SubCutaneous every 8 hours  insulin regular human (HumuLIN R U-100) Pump 1 Each SubCutaneous Continuous Pump  ipratropium    for Nebulization 500 MICROGram(s) Nebulizer every 6 hours  magnesium sulfate  IVPB 1 Gram(s) IV Intermittent every 12 hours  meperidine     Injectable 25 milliGRAM(s) IV Push once  metoprolol tartrate 25 milliGRAM(s) Oral every 8 hours  pantoprazole    Tablet 40 milliGRAM(s) Oral before breakfast  polyethylene glycol 3350 17 Gram(s) Oral daily  senna 1 Tablet(s) Oral every 12 hours  simethicone 80 milliGRAM(s) Chew two times a day  sodium chloride 0.9%. 1000 milliLiter(s) (20 mL/Hr) IV Continuous <Continuous>  tamsulosin 0.4 milliGRAM(s) Oral at bedtime    MEDICATIONS  (PRN):  dextrose 40% Gel 15 Gram(s) Oral once PRN Blood Glucose LESS THAN 70 milliGRAM(s)/deciliter  glucagon  Injectable 1 milliGRAM(s) IntraMuscular once PRN Glucose LESS THAN 70 milligrams/deciliter  oxyCODONE    IR 10 milliGRAM(s) Oral every 4 hours PRN Severe Pain (7 - 10)      LABS:                        11.7   8.77  )-----------( 167      ( 23 Feb 2020 02:00 )             33.8    02-23    138  |  102  |  19  ----------------------------<  129<H>  4.3   |  25  |  0.8    Ca    8.9      23 Feb 2020 02:00  Mg     1.9     02-23    TPro  5.8<L>  /  Alb  3.8  /  TBili  0.7  /  DBili  x   /  AST  50<H>  /  ALT  24  /  AlkPhos  44  02-23    PT/INR - ( 22 Feb 2020 02:00 )   PT: 15.60 sec;   INR: 1.36 ratio         PTT - ( 22 Feb 2020 02:00 )  PTT:33.2 sec LIVER FUNCTIONS - ( 23 Feb 2020 02:00 )  Alb: 3.8 g/dL / Pro: 5.8 g/dL / ALK PHOS: 44 U/L / ALT: 24 U/L / AST: 50 U/L / GGT: x               RADIOLOGY:  X Reviewed and interpreted by me: L-PTX    CARDIOPULMONARY DYSFUNCTION  - Respiratory status required supplemental oxygen & the following of continuous pulse oximetry for support & to prevent decompensation  - Continued early mobilization as tolerated  - Addressed analgesic regimen to optimize function    PREVENTION-PROPHYLAXIS  - ASA continued for graft occlusion-thromboembolism prophylaxis  - Lipitor was also started for long term graft patency  - Heparin continued for VTE prophylaxis in addition to Venodyne boots  - Protonix maintained for GI bleeding prophylaxis  - Lopressor continued for atrial fibrillation prophylaxis  - Metabolic stability & infection prophylaxis required review and adjustment of regular Insulin sliding scale and gylcemic regimen while following serial glucose levels to help achieve & maintain euglycemia  - Reviewed & addressed surgical site infection prophylaxis regimen

## 2020-02-23 NOTE — PROGRESS NOTE ADULT - ASSESSMENT
Assessment/Plan:  CAD-s/p CABG x 3-POD #3  1-BP control-beta-blockers  2-serum glucose control-insulin pump  3-fluid overload-diuresis  4-acute blood loss anemia-stable, continue to monitor Hb/Hct daily  5-urine retention-continue Flomax

## 2020-02-23 NOTE — PROGRESS NOTE ADULT - SUBJECTIVE AND OBJECTIVE BOX
OPERATIVE PROCEDURE(s):                POD #                       39yMale  SURGEON(s): MARITZA Jenkins  SUBJECTIVE ASSESSMENT:   Vital Signs Last 24 Hrs  T(F): 98 (23 Feb 2020 08:00), Max: 100.5 (22 Feb 2020 16:45)  HR: 94 (23 Feb 2020 08:00) (91 - 113)  BP: 99/57 (23 Feb 2020 08:00) (91/60 - 126/77)  BP(mean): 72 (23 Feb 2020 08:00) (67 - 777)  RR: 20 (23 Feb 2020 06:00) (15 - 23)  SpO2: 94% (23 Feb 2020 08:00) (94% - 99%)      I&O's Detail    22 Feb 2020 07:01  -  23 Feb 2020 07:00  --------------------------------------------------------  IN:    insulin regular Infusion: 55 mL    IV PiggyBack: 200 mL    Oral Fluid: 1200 mL    sodium chloride 0.9%.: 160 mL  Total IN: 1615 mL    OUT:    Voided: 1475 mL  Total OUT: 1475 mL        Net:   I&O's Detail    21 Feb 2020 07:01  -  22 Feb 2020 07:00  --------------------------------------------------------  Total NET: 1030 mL      22 Feb 2020 07:01  -  23 Feb 2020 07:00  --------------------------------------------------------  Total NET: 140 mL        CAPILLARY BLOOD GLUCOSE  118 (23 Feb 2020 06:00)  215 (22 Feb 2020 22:00)  216 (22 Feb 2020 20:00)      POCT Blood Glucose.: 118 mg/dL (23 Feb 2020 06:21)  POCT Blood Glucose.: 215 mg/dL (22 Feb 2020 22:20)  POCT Blood Glucose.: 216 mg/dL (22 Feb 2020 20:09)  POCT Blood Glucose.: 273 mg/dL (22 Feb 2020 18:04)  POCT Blood Glucose.: 95 mg/dL (22 Feb 2020 15:12)  POCT Blood Glucose.: 105 mg/dL (22 Feb 2020 14:06)  POCT Blood Glucose.: 151 mg/dL (22 Feb 2020 13:07)  POCT Blood Glucose.: 203 mg/dL (22 Feb 2020 12:07)  POCT Blood Glucose.: 205 mg/dL (22 Feb 2020 11:07)  POCT Blood Glucose.: 204 mg/dL (22 Feb 2020 10:13)    Physical Exam:  General: NAD; A&Ox3/Patient is intubated and sedated  Cardiac: S1/S2, RRR, no murmur, no rubs  Lungs: unlabored respirations, CTA b/l, no wheeze, no rales, no crackles  Abdomen: Soft/NT/ND; positive bowel sounds x 4  Sternum: Intact, no click, incision healing well with no drainage  Incisions: Incisions clean/dry/intact  Extremities: No edema b/l lower extremities; good capillary refill; no cyanosis; palpable 1+ pedal pulses b/l    Central Venous Catheter: Yes[]  No[] , If Yes indication:           Day #  Lin Catheter: Yes  [] , No  [] , If yes indication:                      Day #  NGT: Yes [] No [] ,    If Yes Placement:                                     Day #  EPICARDIAL WIRES:  [] YES [] NO                                              Day #  BOWEL MOVEMENT:  [] YES [] NO, If No, Timing since last BM:      Day #  CHEST TUBE(Left/Right):  [] YES [] NO, If yes -  AIR LEAKS:  [] YES [] NO        LABS:                        11.7<L>  8.77  )-----------( 167      ( 23 Feb 2020 02:00 )             33.8<L>                        11.7<L>  8.94  )-----------( 134      ( 22 Feb 2020 02:00 )             33.6<L>     02-23    138  |  102  |  19  ----------------------------<  129<H>  4.3   |  25  |  0.8  02-22    140  |  105  |  19  ----------------------------<  103<H>  4.1   |  26  |  0.8    Ca    8.9      23 Feb 2020 02:00  Mg     1.9     02-23    TPro  5.8<L> [6.0 - 8.0]  /  Alb  3.8 [3.5 - 5.2]  /  TBili  0.7 [0.2 - 1.2]  /  DBili  x   /  AST  50<H> [0 - 41]  /  ALT  24 [0 - 41]  /  AlkPhos  44 [30 - 115]  02-23    PT/INR - ( 22 Feb 2020 02:00 )   PT: ;   INR: 1.36 ratio         PTT - ( 22 Feb 2020 02:00 )  PTT:33.2 sec      RADIOLOGY & ADDITIONAL TESTS:  CXR:  EKG:  MEDICATIONS  (STANDING):  aspirin enteric coated 325 milliGRAM(s) Oral daily  atorvastatin 40 milliGRAM(s) Oral at bedtime  chlorhexidine 4% Liquid 1 Application(s) Topical <User Schedule>  dextrose 5%. 1000 milliLiter(s) (50 mL/Hr) IV Continuous <Continuous>  dextrose 50% Injectable 12.5 Gram(s) IV Push once  dextrose 50% Injectable 25 Gram(s) IV Push once  dextrose 50% Injectable 50 milliLiter(s) IV Push every 15 minutes  dextrose 50% Injectable 25 milliLiter(s) IV Push every 15 minutes  guaiFENesin  milliGRAM(s) Oral every 12 hours  heparin  Injectable 5000 Unit(s) SubCutaneous every 8 hours  insulin regular human (HumuLIN R U-100) Pump 1 Each SubCutaneous Continuous Pump  insulin regular Infusion 1 Unit(s)/Hr (1 mL/Hr) IV Continuous <Continuous>  ipratropium    for Nebulization 500 MICROGram(s) Nebulizer every 6 hours  magnesium sulfate  IVPB 1 Gram(s) IV Intermittent every 12 hours  meperidine     Injectable 25 milliGRAM(s) IV Push once  metoprolol tartrate 25 milliGRAM(s) Oral every 8 hours  niCARdipine Infusion 5 mG/Hr (25 mL/Hr) IV Continuous <Continuous>  nitroglycerin  Infusion 5 MICROgram(s)/Min (1.5 mL/Hr) IV Continuous <Continuous>  pantoprazole    Tablet 40 milliGRAM(s) Oral before breakfast  polyethylene glycol 3350 17 Gram(s) Oral daily  senna 1 Tablet(s) Oral every 12 hours  simethicone 80 milliGRAM(s) Chew two times a day  sodium chloride 0.9%. 1000 milliLiter(s) (20 mL/Hr) IV Continuous <Continuous>  tamsulosin 0.4 milliGRAM(s) Oral at bedtime    MEDICATIONS  (PRN):  dextrose 40% Gel 15 Gram(s) Oral once PRN Blood Glucose LESS THAN 70 milliGRAM(s)/deciliter  glucagon  Injectable 1 milliGRAM(s) IntraMuscular once PRN Glucose LESS THAN 70 milligrams/deciliter  oxyCODONE    IR 10 milliGRAM(s) Oral every 4 hours PRN Severe Pain (7 - 10)    HEPARIN:  [] YES [] NO  Dose: XX UNITS/HR UNITS Q8H  LOVENOX:[] YES [] NO  Dose: XX mg Q24H  COUMADIN: []  YES [] NO  Dose: XX mg  Q24H  SCD's: YES b/l  GI Prophylaxis: Protonix [], Pepcid [], None [], (Contra-indication:.....)    Post-Op Beta-Blockers: Yes [], No[], If No, then contraindication:  Post-Op Aspirin: Yes [],  No [], If No, then contraindication:  Post-Op Statin: Yes [], No[], If No, then contraindication:  Allergies    No Known Allergies    Intolerances      Ambulation/Activity Status:    Assessment/Plan:  39y Male status-post .....  - Case and plan discussed with CTU Intensivist and CT Surgeon - Dr. Haas/Siri/Alice   - Continue CTU supportive care    - Continue DVT/GI prophylaxis  - Incentive Spirometry 10 times an hour  - Continue to advance physical activity as tolerated and continue PT/OT as directed  1. CAD: Continue ASA, statin, BB  2. HTN:   3. A. Fib:   4. COPD/Hypoxia:   5. DM/Glucose Control:     Social Service Disposition: OPERATIVE PROCEDURE(s):  CABGx3               POD #       3                39yMale  SURGEON(s): MARITZA Jenkins  SUBJECTIVE ASSESSMENT: pt seen and examined. no acute complaints at this time  Vital Signs Last 24 Hrs  T(F): 98 (23 Feb 2020 08:00), Max: 100.5 (22 Feb 2020 16:45)  HR: 94 (23 Feb 2020 08:00) (91 - 113)  BP: 99/57 (23 Feb 2020 08:00) (91/60 - 126/77)  BP(mean): 72 (23 Feb 2020 08:00) (67 - 777)  RR: 20 (23 Feb 2020 06:00) (15 - 23)  SpO2: 94% (23 Feb 2020 08:00) (94% - 99%)      I&O's Detail    22 Feb 2020 07:01  -  23 Feb 2020 07:00  --------------------------------------------------------  IN:    insulin regular Infusion: 55 mL    IV PiggyBack: 200 mL    Oral Fluid: 1200 mL    sodium chloride 0.9%.: 160 mL  Total IN: 1615 mL    OUT:    Voided: 1475 mL  Total OUT: 1475 mL        Net:   I&O's Detail    21 Feb 2020 07:01  -  22 Feb 2020 07:00  --------------------------------------------------------  Total NET: 1030 mL      22 Feb 2020 07:01  -  23 Feb 2020 07:00  --------------------------------------------------------  Total NET: 140 mL        CAPILLARY BLOOD GLUCOSE  118 (23 Feb 2020 06:00)  215 (22 Feb 2020 22:00)  216 (22 Feb 2020 20:00)      POCT Blood Glucose.: 118 mg/dL (23 Feb 2020 06:21)  POCT Blood Glucose.: 215 mg/dL (22 Feb 2020 22:20)  POCT Blood Glucose.: 216 mg/dL (22 Feb 2020 20:09)  POCT Blood Glucose.: 273 mg/dL (22 Feb 2020 18:04)  POCT Blood Glucose.: 95 mg/dL (22 Feb 2020 15:12)  POCT Blood Glucose.: 105 mg/dL (22 Feb 2020 14:06)  POCT Blood Glucose.: 151 mg/dL (22 Feb 2020 13:07)  POCT Blood Glucose.: 203 mg/dL (22 Feb 2020 12:07)  POCT Blood Glucose.: 205 mg/dL (22 Feb 2020 11:07)  POCT Blood Glucose.: 204 mg/dL (22 Feb 2020 10:13)    Physical Exam:  General: NAD; A&Ox3  Cardiac: S1/S2, RRR, no murmur, no rubs  Lungs: decreased bs at bases bilaterally  Abdomen: Soft/NT/ND; positive bowel sounds x 4  Sternum: Intact, no click, incision healing well with no drainage  Incisions: Incisions clean/dry/intact  Extremities: No edema b/l lower extremities; good capillary refill; no cyanosis; palpable 1+ pedal pulses b/l    Central Venous Catheter: Yes[x]  No[] , If Yes indication:      Day #  Lin Catheter: Yes  [] , No  [x] , If yes indication:                      Day #  NGT: Yes [] No [x] ,    If Yes Placement:                                     Day #  EPICARDIAL WIRES:  [x] YES [] NO                                              Day #  BOWEL MOVEMENT:  [x] YES [] NO, If No, Timing since last BM:      Day #  CHEST TUBE(Left/Right):  [] YES [x] NO, If yes -  AIR LEAKS:  [] YES [] NO        LABS:                        11.7<L>  8.77  )-----------( 167      ( 23 Feb 2020 02:00 )             33.8<L>                        11.7<L>  8.94  )-----------( 134      ( 22 Feb 2020 02:00 )             33.6<L>     02-23    138  |  102  |  19  ----------------------------<  129<H>  4.3   |  25  |  0.8  02-22    140  |  105  |  19  ----------------------------<  103<H>  4.1   |  26  |  0.8    Ca    8.9      23 Feb 2020 02:00  Mg     1.9     02-23    TPro  5.8<L> [6.0 - 8.0]  /  Alb  3.8 [3.5 - 5.2]  /  TBili  0.7 [0.2 - 1.2]  /  DBili  x   /  AST  50<H> [0 - 41]  /  ALT  24 [0 - 41]  /  AlkPhos  44 [30 - 115]  02-23    PT/INR - ( 22 Feb 2020 02:00 )   PT: ;   INR: 1.36 ratio         PTT - ( 22 Feb 2020 02:00 )  PTT:33.2 sec      RADIOLOGY & ADDITIONAL TESTS:  CXR: < from: Xray Chest 1 View- PORTABLE-Routine (02.22.20 @ 06:33) >  Impression:      Left sided pneumothorax unchanged. No parenchymal opacity or pleural effusion.    < end of copied text >    EKG:  MEDICATIONS  (STANDING):  aspirin enteric coated 325 milliGRAM(s) Oral daily  atorvastatin 40 milliGRAM(s) Oral at bedtime  chlorhexidine 4% Liquid 1 Application(s) Topical <User Schedule>  dextrose 5%. 1000 milliLiter(s) (50 mL/Hr) IV Continuous <Continuous>  dextrose 50% Injectable 12.5 Gram(s) IV Push once  dextrose 50% Injectable 25 Gram(s) IV Push once  dextrose 50% Injectable 50 milliLiter(s) IV Push every 15 minutes  dextrose 50% Injectable 25 milliLiter(s) IV Push every 15 minutes  guaiFENesin  milliGRAM(s) Oral every 12 hours  heparin  Injectable 5000 Unit(s) SubCutaneous every 8 hours  insulin regular human (HumuLIN R U-100) Pump 1 Each SubCutaneous Continuous Pump  insulin regular Infusion 1 Unit(s)/Hr (1 mL/Hr) IV Continuous <Continuous>  ipratropium    for Nebulization 500 MICROGram(s) Nebulizer every 6 hours  magnesium sulfate  IVPB 1 Gram(s) IV Intermittent every 12 hours  meperidine     Injectable 25 milliGRAM(s) IV Push once  metoprolol tartrate 25 milliGRAM(s) Oral every 8 hours  niCARdipine Infusion 5 mG/Hr (25 mL/Hr) IV Continuous <Continuous>  nitroglycerin  Infusion 5 MICROgram(s)/Min (1.5 mL/Hr) IV Continuous <Continuous>  pantoprazole    Tablet 40 milliGRAM(s) Oral before breakfast  polyethylene glycol 3350 17 Gram(s) Oral daily  senna 1 Tablet(s) Oral every 12 hours  simethicone 80 milliGRAM(s) Chew two times a day  sodium chloride 0.9%. 1000 milliLiter(s) (20 mL/Hr) IV Continuous <Continuous>  tamsulosin 0.4 milliGRAM(s) Oral at bedtime    MEDICATIONS  (PRN):  dextrose 40% Gel 15 Gram(s) Oral once PRN Blood Glucose LESS THAN 70 milliGRAM(s)/deciliter  glucagon  Injectable 1 milliGRAM(s) IntraMuscular once PRN Glucose LESS THAN 70 milligrams/deciliter  oxyCODONE    IR 10 milliGRAM(s) Oral every 4 hours PRN Severe Pain (7 - 10)    HEPARIN:  [x] YES [] NO  Dose: 5000 UNITS Q8H  LOVENOX:[] YES [x] NO  Dose: XX mg Q24H  COUMADIN: []  YES [x] NO  Dose: XX mg  Q24H  SCD's: YES b/l  GI Prophylaxis: Protonix [x], Pepcid [], None [], (Contra-indication:.....)    Post-Op Beta-Blockers: Yes [x], No[], If No, then contraindication:  Post-Op Aspirin: Yes [x],  No [], If No, then contraindication:  Post-Op Statin: Yes [x], No[], If No, then contraindication:  Allergies    No Known Allergies    Intolerances      Ambulation/Activity Status: ambulate    Assessment/Plan:  39y Male status-post CABGx3 POD#3  - Case and plan discussed with CTU Intensivist and CT Surgeon - Dr. Haas/Siri/Alice   - Continue CTU supportive care    - Continue DVT/GI prophylaxis  - Incentive Spirometry 10 times an hour  - Continue to advance physical activity as tolerated and continue PT/OT as directed  1. CAD: Continue ASA, statin, BB, pain control as needed  2. HTN: increase lopressor to 25mg q 8 hours  3. A. Fib prophylaxis: mag 1gm bid, lopressor  4. COPD/Hypoxia: cont nebs, wean o2 as tolerated, mucinex, lasix for fluid overload, encourage incentive spirometry  5. DM/Glucose Control: cont insulin pump  6. small left ptx: stable cont to monitor    Social Service Disposition:  home

## 2020-02-24 ENCOUNTER — TRANSCRIPTION ENCOUNTER (OUTPATIENT)
Age: 40
End: 2020-02-24

## 2020-02-24 VITALS — DIASTOLIC BLOOD PRESSURE: 58 MMHG | SYSTOLIC BLOOD PRESSURE: 107 MMHG | OXYGEN SATURATION: 99 % | HEART RATE: 99 BPM

## 2020-02-24 PROBLEM — Z00.00 ENCOUNTER FOR PREVENTIVE HEALTH EXAMINATION: Status: ACTIVE | Noted: 2020-02-24

## 2020-02-24 PROBLEM — E78.5 HYPERLIPIDEMIA, UNSPECIFIED: Chronic | Status: ACTIVE | Noted: 2020-02-19

## 2020-02-24 LAB
ANION GAP SERPL CALC-SCNC: 10 MMOL/L — SIGNIFICANT CHANGE UP (ref 7–14)
BASOPHILS # BLD AUTO: 0.05 K/UL — SIGNIFICANT CHANGE UP (ref 0–0.2)
BASOPHILS NFR BLD AUTO: 0.7 % — SIGNIFICANT CHANGE UP (ref 0–1)
BUN SERPL-MCNC: 18 MG/DL — SIGNIFICANT CHANGE UP (ref 10–20)
CALCIUM SERPL-MCNC: 8.8 MG/DL — SIGNIFICANT CHANGE UP (ref 8.5–10.1)
CHLORIDE SERPL-SCNC: 99 MMOL/L — SIGNIFICANT CHANGE UP (ref 98–110)
CO2 SERPL-SCNC: 26 MMOL/L — SIGNIFICANT CHANGE UP (ref 17–32)
CREAT SERPL-MCNC: 0.7 MG/DL — SIGNIFICANT CHANGE UP (ref 0.7–1.5)
EOSINOPHIL # BLD AUTO: 0.33 K/UL — SIGNIFICANT CHANGE UP (ref 0–0.7)
EOSINOPHIL NFR BLD AUTO: 4.4 % — SIGNIFICANT CHANGE UP (ref 0–8)
GLUCOSE BLDC GLUCOMTR-MCNC: 166 MG/DL — HIGH (ref 70–99)
GLUCOSE BLDC GLUCOMTR-MCNC: 250 MG/DL — HIGH (ref 70–99)
GLUCOSE SERPL-MCNC: 140 MG/DL — HIGH (ref 70–99)
HCT VFR BLD CALC: 32.8 % — LOW (ref 42–52)
HGB BLD-MCNC: 11.8 G/DL — LOW (ref 14–18)
IMM GRANULOCYTES NFR BLD AUTO: 0.4 % — HIGH (ref 0.1–0.3)
LYMPHOCYTES # BLD AUTO: 2.06 K/UL — SIGNIFICANT CHANGE UP (ref 1.2–3.4)
LYMPHOCYTES # BLD AUTO: 27.4 % — SIGNIFICANT CHANGE UP (ref 20.5–51.1)
MAGNESIUM SERPL-MCNC: 1.8 MG/DL — SIGNIFICANT CHANGE UP (ref 1.8–2.4)
MCHC RBC-ENTMCNC: 29.6 PG — SIGNIFICANT CHANGE UP (ref 27–31)
MCHC RBC-ENTMCNC: 36 G/DL — SIGNIFICANT CHANGE UP (ref 32–37)
MCV RBC AUTO: 82.4 FL — SIGNIFICANT CHANGE UP (ref 80–94)
MONOCYTES # BLD AUTO: 0.94 K/UL — HIGH (ref 0.1–0.6)
MONOCYTES NFR BLD AUTO: 12.5 % — HIGH (ref 1.7–9.3)
NEUTROPHILS # BLD AUTO: 4.12 K/UL — SIGNIFICANT CHANGE UP (ref 1.4–6.5)
NEUTROPHILS NFR BLD AUTO: 54.6 % — SIGNIFICANT CHANGE UP (ref 42.2–75.2)
NRBC # BLD: 0 /100 WBCS — SIGNIFICANT CHANGE UP (ref 0–0)
PLATELET # BLD AUTO: 199 K/UL — SIGNIFICANT CHANGE UP (ref 130–400)
POTASSIUM SERPL-MCNC: 4.1 MMOL/L — SIGNIFICANT CHANGE UP (ref 3.5–5)
POTASSIUM SERPL-SCNC: 4.1 MMOL/L — SIGNIFICANT CHANGE UP (ref 3.5–5)
RBC # BLD: 3.98 M/UL — LOW (ref 4.7–6.1)
RBC # FLD: 12.3 % — SIGNIFICANT CHANGE UP (ref 11.5–14.5)
SODIUM SERPL-SCNC: 135 MMOL/L — SIGNIFICANT CHANGE UP (ref 135–146)
SURGICAL PATHOLOGY STUDY: SIGNIFICANT CHANGE UP
WBC # BLD: 7.53 K/UL — SIGNIFICANT CHANGE UP (ref 4.8–10.8)
WBC # FLD AUTO: 7.53 K/UL — SIGNIFICANT CHANGE UP (ref 4.8–10.8)

## 2020-02-24 PROCEDURE — 99232 SBSQ HOSP IP/OBS MODERATE 35: CPT

## 2020-02-24 PROCEDURE — 99231 SBSQ HOSP IP/OBS SF/LOW 25: CPT

## 2020-02-24 PROCEDURE — 71045 X-RAY EXAM CHEST 1 VIEW: CPT | Mod: 26

## 2020-02-24 PROCEDURE — 93010 ELECTROCARDIOGRAM REPORT: CPT

## 2020-02-24 RX ORDER — ASPIRIN/CALCIUM CARB/MAGNESIUM 324 MG
1 TABLET ORAL
Qty: 30 | Refills: 0
Start: 2020-02-24 | End: 2020-03-24

## 2020-02-24 RX ORDER — DOCUSATE SODIUM 100 MG
1 CAPSULE ORAL
Qty: 60 | Refills: 0
Start: 2020-02-24 | End: 2020-03-24

## 2020-02-24 RX ORDER — PANTOPRAZOLE SODIUM 20 MG/1
1 TABLET, DELAYED RELEASE ORAL
Qty: 30 | Refills: 0
Start: 2020-02-24 | End: 2020-03-24

## 2020-02-24 RX ORDER — FUROSEMIDE 40 MG
40 TABLET ORAL ONCE
Refills: 0 | Status: COMPLETED | OUTPATIENT
Start: 2020-02-24 | End: 2020-02-24

## 2020-02-24 RX ORDER — METOPROLOL TARTRATE 50 MG
1 TABLET ORAL
Qty: 90 | Refills: 0
Start: 2020-02-24 | End: 2020-03-24

## 2020-02-24 RX ORDER — ASPIRIN/CALCIUM CARB/MAGNESIUM 324 MG
1 TABLET ORAL
Qty: 0 | Refills: 0 | DISCHARGE

## 2020-02-24 RX ORDER — POTASSIUM CHLORIDE 20 MEQ
1 PACKET (EA) ORAL
Qty: 5 | Refills: 0
Start: 2020-02-24 | End: 2020-02-28

## 2020-02-24 RX ORDER — ATORVASTATIN CALCIUM 80 MG/1
1 TABLET, FILM COATED ORAL
Qty: 30 | Refills: 0
Start: 2020-02-24 | End: 2020-03-24

## 2020-02-24 RX ORDER — ATORVASTATIN CALCIUM 80 MG/1
1 TABLET, FILM COATED ORAL
Qty: 0 | Refills: 0 | DISCHARGE

## 2020-02-24 RX ORDER — FUROSEMIDE 40 MG
1 TABLET ORAL
Qty: 5 | Refills: 0
Start: 2020-02-24 | End: 2020-02-28

## 2020-02-24 RX ORDER — POTASSIUM CHLORIDE 20 MEQ
20 PACKET (EA) ORAL ONCE
Refills: 0 | Status: COMPLETED | OUTPATIENT
Start: 2020-02-24 | End: 2020-02-24

## 2020-02-24 RX ADMIN — Medication 20 MILLIEQUIVALENT(S): at 08:45

## 2020-02-24 RX ADMIN — Medication 650 MILLIGRAM(S): at 07:00

## 2020-02-24 RX ADMIN — Medication 25 MILLIGRAM(S): at 06:02

## 2020-02-24 RX ADMIN — Medication 325 MILLIGRAM(S): at 11:01

## 2020-02-24 RX ADMIN — Medication 650 MILLIGRAM(S): at 06:02

## 2020-02-24 RX ADMIN — Medication 600 MILLIGRAM(S): at 06:02

## 2020-02-24 RX ADMIN — OXYCODONE HYDROCHLORIDE 10 MILLIGRAM(S): 5 TABLET ORAL at 06:45

## 2020-02-24 RX ADMIN — PANTOPRAZOLE SODIUM 40 MILLIGRAM(S): 20 TABLET, DELAYED RELEASE ORAL at 06:02

## 2020-02-24 RX ADMIN — Medication 100 GRAM(S): at 06:02

## 2020-02-24 RX ADMIN — CHLORHEXIDINE GLUCONATE 1 APPLICATION(S): 213 SOLUTION TOPICAL at 06:02

## 2020-02-24 RX ADMIN — SENNA PLUS 1 TABLET(S): 8.6 TABLET ORAL at 06:02

## 2020-02-24 RX ADMIN — POLYETHYLENE GLYCOL 3350 17 GRAM(S): 17 POWDER, FOR SOLUTION ORAL at 11:01

## 2020-02-24 RX ADMIN — OXYCODONE HYDROCHLORIDE 10 MILLIGRAM(S): 5 TABLET ORAL at 02:45

## 2020-02-24 RX ADMIN — OXYCODONE HYDROCHLORIDE 10 MILLIGRAM(S): 5 TABLET ORAL at 06:14

## 2020-02-24 RX ADMIN — Medication 500 MICROGRAM(S): at 08:00

## 2020-02-24 RX ADMIN — OXYCODONE HYDROCHLORIDE 10 MILLIGRAM(S): 5 TABLET ORAL at 02:13

## 2020-02-24 RX ADMIN — Medication 40 MILLIGRAM(S): at 08:44

## 2020-02-24 RX ADMIN — SIMETHICONE 80 MILLIGRAM(S): 80 TABLET, CHEWABLE ORAL at 06:02

## 2020-02-24 NOTE — PROGRESS NOTE ADULT - SUBJECTIVE AND OBJECTIVE BOX
CTU Attending Progress Daily Note     24 Feb 2020 07:48    Procedure:                                                  POD#                   Patient seen as post-op critical care follow-up    HPI:  38 y/o M with PMH of Type I DM on insulin pump for 32 years, DLD presented for [facility] after abnormal CT coronaries with multiple blockages     2/4/20 Calcium Score 1300.81  Severe stenosis >75% in prox, mid LAD, prox ramus intermedius  Moderate stenosis in distal circ and mod-severe stenosis in prox OM1  RPDA moderate stenosis (19 Feb 2020 09:34)    See preop testing chart H&P    Interval event for past 24 hr:  LIZ ALVARADO  39y had no event.     Current Complains:  LIZ ALVARADO has no new complaints    REVIEW OF SYSTEMS:  CONSTITUTIONAL:  [-] weakness, [-] fevers, [-] chills  EYES/ENT: [-] visual changes, [-] vertigo, [-] throat pain   NECK: [-] pain, [-] stiffness  RESPIRATORY: [-] cough, [-] wheezing, [-] hemoptysis, [-] shortness of breath  CARDIOVASCULAR: [-] chest pain, [-] palpitations, [-] orthopnea  GASTROINTESTINAL:    [-]abdominal pain, [-] nausea, [-] vomiting, [-] hematemesis, [-] diarrhea, [-] constipation, [-] melena, [-] hematochezia.  GENITOURINARY: [-] dysuria, [-] frequency, [-] hematuria  NEUROLOGICAL: [-] numbness, [-] weakness  SKIN: [-] itching, [-] burning, [-] rashes, [-] lesions   All other review of systems is negative unless indicated above.    [  ] Unable to assess ROS because :    OBJECTIVE:  ICU Vital Signs Last 24 Hrs  T(C): 36.2 (24 Feb 2020 04:00), Max: 36.9 (23 Feb 2020 16:10)  T(F): 97.1 (24 Feb 2020 04:00), Max: 98.4 (23 Feb 2020 16:10)  HR: 96 (24 Feb 2020 06:00) (94 - 107)  BP: 108/64 (24 Feb 2020 06:00) (90/54 - 124/73)  BP(mean): 80 (24 Feb 2020 06:00) (68 - 93)  ABP: --  ABP(mean): --  RR: --  SpO2: 97% (24 Feb 2020 06:00) (94% - 98%)      I&O's Summary    23 Feb 2020 07:01  -  24 Feb 2020 07:00  --------------------------------------------------------  IN: 1160 mL / OUT: 1251 mL / NET: -91 mL      Adult Advanced Hemodynamics Last 24 Hrs  CVP(mm Hg): --  CVP(cm H2O): --  CO: --  CI: --  PA: --  PA(mean): --  PCWP: --  SVR: --  SVRI: --  PVR: --  PVRI: --      PHYSICAL EXAM:  General: WN/WD NAD    HEENT:     [+] NCAT  [+] EOMI  [-] Conjuctival edema   [-] Icterus   [-] Thrush   [-] ETT  [-] NGT/OGT    Neck:         [+] FROM   [-] JVD     [-] Nodes     [-] Masses    [+] Mid-line trachea    [-] Tracheostomy    Chest:         [-] Sternal click   [-] Sternal drainage   [+] Pacing wires   [+] Chest tubes   [-] SubQ emphysema    Lungs:          [+] CTA   [-] Rhonchi   [-] Rales    [-] Wheezing    [-] Decreased BS    [-] Dullness R L    Cardiac:       [+] S1 [+] S2    [+] RRR   [-] Irregular   [-] S3   [-] S4    [-] Murmurs    [-] Rub    Abdomen:    [+] BS    [+] Soft    [+] Non-tender     [-] Distended    [-] Organomegaly  [-] PEG    Extremities:   [-] Cyanosis U/L   [-] Clubbing  U/L  [-] LE/UE Edema   [+] Capillary refill    [+] Pulses     Neuro:        [+] Awake   [+]  Alert   [-] Confused   [-] Lethargic   [-] Sedated   [-] Generalized Weakness    Skin:        [-] Rashes    [-] Erythema   [+] Normal incisions   [+] IV sites intact   [-] Sacral decubitus    Tubes:  LINES:    CAPILLARY BLOOD GLUCOSE  118 (23 Feb 2020 06:00)      POCT Blood Glucose.: 166 mg/dL (24 Feb 2020 05:58)    CAPILLARY BLOOD GLUCOSE      POCT Blood Glucose.: 166 mg/dL (24 Feb 2020 05:58)  POCT Blood Glucose.: 196 mg/dL (23 Feb 2020 21:28)  POCT Blood Glucose.: 253 mg/dL (23 Feb 2020 16:08)  POCT Blood Glucose.: 217 mg/dL (23 Feb 2020 10:53)      HOSPITAL MEDICATIONS:  MEDICATIONS  (STANDING):  aspirin enteric coated 325 milliGRAM(s) Oral daily  atorvastatin 40 milliGRAM(s) Oral at bedtime  chlorhexidine 4% Liquid 1 Application(s) Topical <User Schedule>  dextrose 5%. 1000 milliLiter(s) (50 mL/Hr) IV Continuous <Continuous>  dextrose 50% Injectable 12.5 Gram(s) IV Push once  dextrose 50% Injectable 25 Gram(s) IV Push once  dextrose 50% Injectable 50 milliLiter(s) IV Push every 15 minutes  dextrose 50% Injectable 25 milliLiter(s) IV Push every 15 minutes  guaiFENesin  milliGRAM(s) Oral every 12 hours  heparin  Injectable 5000 Unit(s) SubCutaneous every 8 hours  insulin regular human (HumuLIN R U-100) Pump 1 Each SubCutaneous Continuous Pump  ipratropium    for Nebulization 500 MICROGram(s) Nebulizer every 6 hours  magnesium sulfate  IVPB 1 Gram(s) IV Intermittent every 12 hours  meperidine     Injectable 25 milliGRAM(s) IV Push once  metoprolol tartrate 25 milliGRAM(s) Oral every 8 hours  pantoprazole    Tablet 40 milliGRAM(s) Oral before breakfast  polyethylene glycol 3350 17 Gram(s) Oral daily  senna 1 Tablet(s) Oral every 12 hours  simethicone 80 milliGRAM(s) Chew two times a day  sodium chloride 0.9%. 1000 milliLiter(s) (20 mL/Hr) IV Continuous <Continuous>  tamsulosin 0.4 milliGRAM(s) Oral at bedtime    MEDICATIONS  (PRN):  acetaminophen   Tablet .. 650 milliGRAM(s) Oral every 6 hours PRN Moderate Pain (4 - 6)  dextrose 40% Gel 15 Gram(s) Oral once PRN Blood Glucose LESS THAN 70 milliGRAM(s)/deciliter  glucagon  Injectable 1 milliGRAM(s) IntraMuscular once PRN Glucose LESS THAN 70 milligrams/deciliter  oxyCODONE    IR 10 milliGRAM(s) Oral every 4 hours PRN Severe Pain (7 - 10)      LABS:                          11.8   7.53  )-----------( 199      ( 24 Feb 2020 02:00 )             32.8     02-24    135  |  99  |  18  ----------------------------<  140<H>  4.1   |  26  |  0.7    Ca    8.8      24 Feb 2020 02:00  Mg     1.8     02-24    TPro  5.8<L>  /  Alb  3.8  /  TBili  0.7  /  DBili  x   /  AST  50<H>  /  ALT  24  /  AlkPhos  44  02-23            RADIOLOGY:  Reviewed and interpreted by me  CXR from 02-24-20 shows [+] mild congestion, [-] pneumothorax, [-] R/L effusion, [-] cardiomegaly,   NGT in place, S-G Catheter in place, R/L TLC in place, R/L Chest Tubes in place    ECG:  Reviewed and interpreted by me:   QTC:    Assessment:      PAST MEDICAL & SURGICAL HISTORY:  Hyperlipidemia  Diabetes, type I  History of trigger finger      PLAN:  Neuro: Pain control  Pulm: Encourage coughing, deep breathing and use of incentive spirometry. Wean off supplemental oxygen as able. Daily CXR.   Cardio: Monitor telemetry/alarms. Continue cardiac meds  GI: Tolerating diet. Continue stool softeners. Continue GI prophylaxis  Renal: monitor urine output, supplement electrolytes as needed  Vasc: Heparin SC/SCDs for DVT prophylaxis  Heme: Monitor H/H.   ID: Off antibiotics. Stable.  Endocrine: Monitor finger stick blood sugar and control hyperglycemia with insulin  Physical Therapy: OOB/ambulate  Tubes: Monitor Chest tube output      Discussed with Cardiothoracic Team at AM rounds.    45 minutes of critical care time spent providing medical care for patient's acute illness/conditions that impairs at least one vital organ system and/or poses a high risk of imminent or life threatening deterioration in the patient's condition. It includes time spent evaluating and treating the patient's acute illness as well as time spent reviewing labs, radiology, discussing goals of care with patient and/or patient's family, and discussing the case with a multidisciplinary team in an effort to prevent further life threatening deterioration or end organ damage. This time is independent of any procedures performed. CTU Attending Progress Daily Note     24 Feb 2020 07:48    Procedure:             CABG 3                                     POD#     4              Patient seen as post-op critical care follow-up    HPI:  40 y/o M with PMH of Type I DM on insulin pump for 32 years, DLD presented for [facility] after abnormal CT coronaries with multiple blockages     2/4/20 Calcium Score 1300.81  Severe stenosis >75% in prox, mid LAD, prox ramus intermedius  Moderate stenosis in distal circ and mod-severe stenosis in prox OM1  RPDA moderate stenosis (19 Feb 2020 09:34)    See preop testing chart H&P    Interval event for past 24 hr:  LIZ ALVARADO  39y had no event.     Current Complains:  LIZ ALVARADO has no new complaints    REVIEW OF SYSTEMS:  CONSTITUTIONAL:  [-] weakness, [-] fevers, [-] chills  EYES/ENT: [-] visual changes, [-] vertigo, [-] throat pain   NECK: [-] pain, [-] stiffness  RESPIRATORY: [-] cough, [-] wheezing, [-] hemoptysis, [-] shortness of breath  CARDIOVASCULAR: [-] chest pain, [-] palpitations, [-] orthopnea  GASTROINTESTINAL:    [-]abdominal pain, [-] nausea, [-] vomiting, [-] hematemesis, [-] diarrhea, [-] constipation, [-] melena, [-] hematochezia.  GENITOURINARY: [-] dysuria, [-] frequency, [-] hematuria  NEUROLOGICAL: [-] numbness, [-] weakness  SKIN: [-] itching, [-] burning, [-] rashes, [-] lesions   All other review of systems is negative unless indicated above.    [  ] Unable to assess ROS because :    OBJECTIVE:  ICU Vital Signs Last 24 Hrs  T(C): 36.2 (24 Feb 2020 04:00), Max: 36.9 (23 Feb 2020 16:10)  T(F): 97.1 (24 Feb 2020 04:00), Max: 98.4 (23 Feb 2020 16:10)  HR: 96 (24 Feb 2020 06:00) (94 - 107)  BP: 108/64 (24 Feb 2020 06:00) (90/54 - 124/73)  BP(mean): 80 (24 Feb 2020 06:00) (68 - 93)  ABP: --  ABP(mean): --  RR: --  SpO2: 97% (24 Feb 2020 06:00) (94% - 98%)      I&O's Summary    23 Feb 2020 07:01  -  24 Feb 2020 07:00  --------------------------------------------------------  IN: 1160 mL / OUT: 1251 mL / NET: -91 mL      PHYSICAL EXAM:  General: WN/WD NAD    HEENT:     [+] NCAT  [+] EOMI  [-] Conjuctival edema   [-] Icterus   [-] Thrush   [-] ETT  [-] NGT/OGT    Neck:         [+] FROM   [-] JVD     [-] Nodes     [-] Masses    [+] Mid-line trachea    [-] Tracheostomy    Chest:         [-] Sternal click   [-] Sternal drainage   [+] Pacing wires   [+] Chest tubes   [-] SubQ emphysema    Lungs:          [+] CTA   [-] Rhonchi   [-] Rales    [-] Wheezing    [-] Decreased BS    [-] Dullness R L    Cardiac:       [+] S1 [+] S2    [+] RRR   [-] Irregular   [-] S3   [-] S4    [-] Murmurs    [-] Rub    Abdomen:    [+] BS    [+] Soft    [+] Non-tender     [-] Distended    [-] Organomegaly  [-] PEG    Extremities:   [-] Cyanosis U/L   [-] Clubbing  U/L  [-] LE/UE Edema   [+] Capillary refill    [+] Pulses     Neuro:        [+] Awake   [+]  Alert   [-] Confused   [-] Lethargic   [-] Sedated   [-] Generalized Weakness    Skin:        [-] Rashes    [-] Erythema   [+] Normal incisions   [+] IV sites intact   [-] Sacral decubitus    Tubes:  LINES:    CAPILLARY BLOOD GLUCOSE  118 (23 Feb 2020 06:00)      POCT Blood Glucose.: 166 mg/dL (24 Feb 2020 05:58)    CAPILLARY BLOOD GLUCOSE      POCT Blood Glucose.: 166 mg/dL (24 Feb 2020 05:58)  POCT Blood Glucose.: 196 mg/dL (23 Feb 2020 21:28)  POCT Blood Glucose.: 253 mg/dL (23 Feb 2020 16:08)  POCT Blood Glucose.: 217 mg/dL (23 Feb 2020 10:53)      HOSPITAL MEDICATIONS:  MEDICATIONS  (STANDING):  aspirin enteric coated 325 milliGRAM(s) Oral daily  atorvastatin 40 milliGRAM(s) Oral at bedtime  chlorhexidine 4% Liquid 1 Application(s) Topical <User Schedule>  dextrose 5%. 1000 milliLiter(s) (50 mL/Hr) IV Continuous <Continuous>  dextrose 50% Injectable 12.5 Gram(s) IV Push once  dextrose 50% Injectable 25 Gram(s) IV Push once  dextrose 50% Injectable 50 milliLiter(s) IV Push every 15 minutes  dextrose 50% Injectable 25 milliLiter(s) IV Push every 15 minutes  guaiFENesin  milliGRAM(s) Oral every 12 hours  heparin  Injectable 5000 Unit(s) SubCutaneous every 8 hours  insulin regular human (HumuLIN R U-100) Pump 1 Each SubCutaneous Continuous Pump  ipratropium    for Nebulization 500 MICROGram(s) Nebulizer every 6 hours  magnesium sulfate  IVPB 1 Gram(s) IV Intermittent every 12 hours  meperidine     Injectable 25 milliGRAM(s) IV Push once  metoprolol tartrate 25 milliGRAM(s) Oral every 8 hours  pantoprazole    Tablet 40 milliGRAM(s) Oral before breakfast  polyethylene glycol 3350 17 Gram(s) Oral daily  senna 1 Tablet(s) Oral every 12 hours  simethicone 80 milliGRAM(s) Chew two times a day  sodium chloride 0.9%. 1000 milliLiter(s) (20 mL/Hr) IV Continuous <Continuous>  tamsulosin 0.4 milliGRAM(s) Oral at bedtime    MEDICATIONS  (PRN):  acetaminophen   Tablet .. 650 milliGRAM(s) Oral every 6 hours PRN Moderate Pain (4 - 6)  dextrose 40% Gel 15 Gram(s) Oral once PRN Blood Glucose LESS THAN 70 milliGRAM(s)/deciliter  glucagon  Injectable 1 milliGRAM(s) IntraMuscular once PRN Glucose LESS THAN 70 milligrams/deciliter  oxyCODONE    IR 10 milliGRAM(s) Oral every 4 hours PRN Severe Pain (7 - 10)      LABS:                          11.8   7.53  )-----------( 199      ( 24 Feb 2020 02:00 )             32.8     02-24    135  |  99  |  18  ----------------------------<  140<H>  4.1   |  26  |  0.7    Ca    8.8      24 Feb 2020 02:00  Mg     1.8     02-24    TPro  5.8<L>  /  Alb  3.8  /  TBili  0.7  /  DBili  x   /  AST  50<H>  /  ALT  24  /  AlkPhos  44  02-23            RADIOLOGY:  Reviewed and interpreted by me  CXR from 02-24-20 shows [+] mild congestion, [-] pneumothorax, [-] R/L effusion, [-] cardiomegaly,       ECG:  Reviewed and interpreted by me: SR 98  QTC: 421    Assessment:  CAD SP CABG      PAST MEDICAL & SURGICAL HISTORY:  Hyperlipidemia  Diabetes, type I  History of trigger finger      PLAN:  Neuro: Pain control  Pulm: Encourage coughing, deep breathing and use of incentive spirometry. Wean off supplemental oxygen as able. Daily CXR.   Cardio: Monitor telemetry/alarms. Continue cardiac meds  GI: Tolerating diet. Continue stool softeners. Continue GI prophylaxis  Renal: monitor urine output, supplement electrolytes as needed  Vasc: Heparin SC/SCDs for DVT prophylaxis  Heme: Monitor H/H.   ID: Off antibiotics. Stable.  Endocrine: Monitor finger stick blood sugar and control hyperglycemia with insulin  Physical Therapy: OOB/ambulate      Discussed with Cardiothoracic Team at AM rounds.

## 2020-02-24 NOTE — ED ADULT NURSE NOTE - ISOLATION TYPE:
None Trilobed Flap Text: The defect edges were debeveled with a #15 scalpel blade.  Given the location of the defect and the proximity to free margins a trilobed flap was deemed most appropriate.  Using a sterile surgical marker, an appropriate trilobed flap drawn around the defect.    The area thus outlined was incised deep to adipose tissue with a #15 scalpel blade.  The skin margins were undermined to an appropriate distance in all directions utilizing iris scissors.

## 2020-02-24 NOTE — PROGRESS NOTE ADULT - SUBJECTIVE AND OBJECTIVE BOX
OPERATIVE PROCEDURE(s): C3IMA                POD #4    SURGEON(s):Alice    SUBJECTIVE ASSESSMENT:  pt has no major complaints    Vital Signs Last 24 Hrs  T(C): 36.2 (24 Feb 2020 04:00), Max: 36.9 (23 Feb 2020 16:10)  T(F): 97.1 (24 Feb 2020 04:00), Max: 98.4 (23 Feb 2020 16:10)  HR: 96 (24 Feb 2020 06:00) (94 - 107)  BP: 108/64 (24 Feb 2020 06:00) (90/54 - 124/73)  BP(mean): 80 (24 Feb 2020 06:00) (68 - 93)  SpO2: 97% (24 Feb 2020 06:00) (95% - 98%)  02-23-20 @ 07:01  -  02-24-20 @ 07:00  --------------------------------------------------------  IN: 1160 mL / OUT: 1251 mL / NET: -91 mL    A&OX3 in NAD  CTA bilat  sternum stable, no drainage  nl s1, s2  abd soft/NT/ND  no peripheral edema  SVG harvest site is healing well    LABS:                        11.8   7.53  )-----------( 199      ( 24 Feb 2020 02:00 )             32.8     02-24    135  |  99  |  18  ----------------------------<  140<H>  4.1   |  26  |  0.7    Ca    8.8      24 Feb 2020 02:00  Mg     1.8     02-24    TPro  5.8<L>  /  Alb  3.8  /  TBili  0.7  /  DBili  x   /  AST  50<H>  /  ALT  24  /  AlkPhos  44  02-23    MEDICATIONS  (STANDING):  aspirin enteric coated 325 milliGRAM(s) Oral daily  atorvastatin 40 milliGRAM(s) Oral at bedtime  guaiFENesin  milliGRAM(s) Oral every 12 hours  heparin  Injectable 5000 Unit(s) SubCutaneous every 8 hours  insulin regular human (HumuLIN R U-100) Pump 1 Each SubCutaneous Continuous Pump  ipratropium    for Nebulization 500 MICROGram(s) Nebulizer every 6 hours  magnesium sulfate  IVPB 1 Gram(s) IV Intermittent every 12 hours  meperidine     Injectable 25 milliGRAM(s) IV Push once  metoprolol tartrate 25 milliGRAM(s) Oral every 8 hours  pantoprazole    Tablet 40 milliGRAM(s) Oral before breakfast  polyethylene glycol 3350 17 Gram(s) Oral daily  senna 1 Tablet(s) Oral every 12 hours  simethicone 80 milliGRAM(s) Chew two times a day  tamsulosin 0.4 milliGRAM(s) Oral at bedtime    MEDICATIONS  (PRN):  acetaminophen   Tablet .. 650 milliGRAM(s) Oral every 6 hours PRN Moderate Pain (4 - 6)  dextrose 40% Gel 15 Gram(s) Oral once PRN Blood Glucose LESS THAN 70 milliGRAM(s)/deciliter  glucagon  Injectable 1 milliGRAM(s) IntraMuscular once PRN Glucose LESS THAN 70 milligrams/deciliter  oxyCODONE    IR 10 milliGRAM(s) Oral every 4 hours PRN Severe Pain (7 - 10)

## 2020-02-24 NOTE — DISCHARGE NOTE NURSING/CASE MANAGEMENT/SOCIAL WORK - PATIENT PORTAL LINK FT
You can access the FollowMyHealth Patient Portal offered by [facility] by registering at the following website: [email or web address]. By joining [employer]’s FollowMyHealth portal, you will also be able to view your health information using other applications (apps) compatible with our system.

## 2020-02-24 NOTE — PROGRESS NOTE ADULT - ATTENDING COMMENTS
POD 4 after C3IMA  doing well  cont ASA/SQH/BB  cont mild Diuresis  Cont pulmonary toilet, Chest PT, pain control, Incentive spirometry  OOB ambulate  possible d/c  case d/w CTU team

## 2020-02-26 ENCOUNTER — APPOINTMENT (OUTPATIENT)
Dept: CARE COORDINATION | Facility: HOME HEALTH | Age: 40
End: 2020-02-26
Payer: COMMERCIAL

## 2020-02-26 DIAGNOSIS — E78.5 HYPERLIPIDEMIA, UNSPECIFIED: ICD-10-CM

## 2020-02-26 DIAGNOSIS — E10.9 TYPE 1 DIABETES MELLITUS W/OUT COMPLICATIONS: ICD-10-CM

## 2020-02-26 PROCEDURE — 99024 POSTOP FOLLOW-UP VISIT: CPT

## 2020-02-27 VITALS
RESPIRATION RATE: 14 BRPM | OXYGEN SATURATION: 97 % | SYSTOLIC BLOOD PRESSURE: 120 MMHG | HEART RATE: 92 BPM | DIASTOLIC BLOOD PRESSURE: 70 MMHG

## 2020-02-27 PROBLEM — E10.9 T1DM (TYPE 1 DIABETES MELLITUS): Status: ACTIVE | Noted: 2020-02-27

## 2020-02-27 PROBLEM — E78.5 DYSLIPIDEMIA: Status: ACTIVE | Noted: 2020-02-27

## 2020-02-27 RX ORDER — ASPIRIN 325 MG/1
325 TABLET, FILM COATED ORAL
Refills: 0 | Status: ACTIVE | COMMUNITY

## 2020-02-27 RX ORDER — INSULIN LISPRO 100 [IU]/ML
INJECTION, SOLUTION INTRAVENOUS; SUBCUTANEOUS
Refills: 0 | Status: ACTIVE | COMMUNITY

## 2020-02-27 RX ORDER — ATORVASTATIN CALCIUM 40 MG/1
40 TABLET, FILM COATED ORAL
Refills: 0 | Status: ACTIVE | COMMUNITY

## 2020-03-02 ENCOUNTER — APPOINTMENT (OUTPATIENT)
Dept: CARDIOTHORACIC SURGERY | Facility: CLINIC | Age: 40
End: 2020-03-02
Payer: COMMERCIAL

## 2020-03-02 VITALS
WEIGHT: 190 LBS | BODY MASS INDEX: 27.2 KG/M2 | HEIGHT: 70 IN | OXYGEN SATURATION: 97 % | DIASTOLIC BLOOD PRESSURE: 75 MMHG | TEMPERATURE: 97.6 F | RESPIRATION RATE: 13 BRPM | HEART RATE: 76 BPM | SYSTOLIC BLOOD PRESSURE: 120 MMHG

## 2020-03-02 PROCEDURE — 99024 POSTOP FOLLOW-UP VISIT: CPT

## 2020-03-02 RX ORDER — POTASSIUM CHLORIDE 1500 MG/1
20 TABLET, FILM COATED, EXTENDED RELEASE ORAL
Refills: 0 | Status: DISCONTINUED | COMMUNITY
End: 2020-03-02

## 2020-03-02 RX ORDER — FUROSEMIDE 40 MG/1
40 TABLET ORAL
Refills: 0 | Status: DISCONTINUED | COMMUNITY
End: 2020-03-02

## 2020-03-03 NOTE — HISTORY OF PRESENT ILLNESS
[FreeTextEntry1] : FOLLOW YOUR HEART\par \par 2/24:  Hospital Course	 \par 40 y/o M with PMH of Type I DM on insulin pump for 32 years and DLD. Patient had recent admission for bacterial PNA treated with oral antibiotics 12/2019. Patient had ct chest on that admission which demonstrated coronary calcifications prompting LHC. Patient presented for elective LHC demonstrating \par severe 3vCAD to LAD, cirx and RPDA w/preserved ef. He was admitted post catheterization for myocardial revascularization via CABG performed the following day. Post-operatively, patient had an uneventful hospital course. He was discharged home in stable condition on POD#4 with instructions to follow up with Dr. Jenkins on 3/2/2020 @ 1:15pm. \par \par Patient is seen at home.  He is noted to be +QUINTANA and states he is doing well.

## 2020-03-03 NOTE — PHYSICAL EXAM
[Decreased Breath Sounds Unilaterally Left Lung Base] : breath sounds were diminished over the left base [Heart Rate And Rhythm] : heart rate was normal and rhythm regular [Heart Sounds] : normal S1 and S2 [Heart Sounds Gallop] : no gallops [Murmurs] : no murmurs [Heart Sounds Pericardial Friction Rub] : no pericardial rub [Examination Of The Chest] : the chest was normal in appearance [Chest Visual Inspection Thoracic Asymmetry] : no chest asymmetry [Diminished Respiratory Excursion] : normal chest expansion [FreeTextEntry1] : KENNEDY MIV C/D/I

## 2020-03-03 NOTE — ASSESSMENT
[FreeTextEntry1] : Patient appears to be doing well.  + QUINTANA - he has 3 more days of lasix 40 mg QD as prescribed on discharge.\par \par Education\par 1.  DC instructions reviewed with patient - discussed importance of medications (indication, schedule, side effects, and importance of compliance reviewed) and f/u appointments.\par 2.  Clean incision sites daily - shower indirectly, clean with soap and water, pat dry.  Avoid the use of lotions, ointments, powders, and perfumes on or around incision sites.\par 3.  Sternal precautions - avoid any heavy lifting (>5-10 lbs x 8 weeks), raising both arms above head simultaneously.\par 4.  Place TEDs on in AM prior to getting out of bed and off in PM when returning to bed.\par 5.  Follow a heart healthy diet - low salt, low fat.\par 6.  Exercise daily.\par 7.  Monitor and call [facility] NP for signs and symptoms of infection (redness, drainage, and fever).\par 8.  Monitor daily weights (call for a gain of 2-3 lbs/day or 5-6 lbs/week). \par 9.  Blood sugar control necessary for wound healing if applicable.\par 10.  Avoid straining during BM - use stool softners as needed. \par 11.  Instructed on importance of incentive spirometry use (10x/hour).\par \par Patient verbalized understanding of all education outlined above. Pt instructed to call [facility] NP for any issues as delineated above.\par \par PLAN\par 1.  Monitor daily weights\par 2.  Continue current medications as prescribed\par 3.  Incentive spirometry use\par 4.  Maintain sternal precautions\par 5.  Exercise daily\par 5.  Maintain HH diet\par 6.  Maintain TEDs\par 7.  Keep legs elevated\par 8.  F/U appointments - \par CTSx Dr. Jenkins 3/2\par 9.  [facility] NP will continue to f/u with patient status - Pt agrees to call with any questions/concerns\par 10. To recover without complications.\par

## 2020-03-06 DIAGNOSIS — E87.70 FLUID OVERLOAD, UNSPECIFIED: ICD-10-CM
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
## 2020-03-06 DIAGNOSIS — Z87.891 PERSONAL HISTORY OF NICOTINE DEPENDENCE: ICD-10-CM

## 2020-03-06 DIAGNOSIS — D62 ACUTE POSTHEMORRHAGIC ANEMIA: ICD-10-CM

## 2020-03-06 DIAGNOSIS — I25.84 CORONARY ATHEROSCLEROSIS DUE TO CALCIFIED CORONARY LESION: ICD-10-CM

## 2020-03-06 DIAGNOSIS — R33.9 RETENTION OF URINE, UNSPECIFIED: ICD-10-CM

## 2020-03-06 DIAGNOSIS — I11.0 HYPERTENSIVE HEART DISEASE WITH HEART FAILURE: ICD-10-CM

## 2020-03-06 DIAGNOSIS — E78.5 HYPERLIPIDEMIA, UNSPECIFIED: ICD-10-CM

## 2020-03-06 DIAGNOSIS — Z96.41 PRESENCE OF INSULIN PUMP (EXTERNAL) (INTERNAL): ICD-10-CM

## 2020-03-06 DIAGNOSIS — J95.811 POSTPROCEDURAL PNEUMOTHORAX: ICD-10-CM
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
## 2020-03-06 DIAGNOSIS — Z79.82 LONG TERM (CURRENT) USE OF ASPIRIN: ICD-10-CM

## 2020-03-06 DIAGNOSIS — I50.30 UNSPECIFIED DIASTOLIC (CONGESTIVE) HEART FAILURE: ICD-10-CM

## 2020-03-06 DIAGNOSIS — E10.9 TYPE 1 DIABETES MELLITUS WITHOUT COMPLICATIONS: ICD-10-CM

## 2020-03-16 ENCOUNTER — APPOINTMENT (OUTPATIENT)
Dept: CARDIOTHORACIC SURGERY | Facility: CLINIC | Age: 40
End: 2020-03-16
Payer: COMMERCIAL

## 2020-03-16 VITALS
RESPIRATION RATE: 12 BRPM | HEIGHT: 70 IN | DIASTOLIC BLOOD PRESSURE: 76 MMHG | TEMPERATURE: 97.8 F | SYSTOLIC BLOOD PRESSURE: 124 MMHG | OXYGEN SATURATION: 99 % | WEIGHT: 184 LBS | BODY MASS INDEX: 26.34 KG/M2 | HEART RATE: 82 BPM

## 2020-03-16 DIAGNOSIS — Z95.1 PRESENCE OF AORTOCORONARY BYPASS GRAFT: ICD-10-CM

## 2020-03-16 PROCEDURE — 99024 POSTOP FOLLOW-UP VISIT: CPT

## 2020-03-16 RX ORDER — GUAIFENESIN 600 MG
600 TABLET, EXTENDED RELEASE ORAL
Refills: 0 | Status: DISCONTINUED | COMMUNITY
End: 2020-03-16

## 2020-03-16 RX ORDER — PANTOPRAZOLE 40 MG/1
40 TABLET, DELAYED RELEASE ORAL
Refills: 0 | Status: DISCONTINUED | COMMUNITY
End: 2020-03-16

## 2020-03-16 RX ORDER — DOCUSATE SODIUM 100 MG/1
100 CAPSULE ORAL
Refills: 0 | Status: DISCONTINUED | COMMUNITY
End: 2020-03-16

## 2020-03-16 RX ORDER — BUDESONIDE AND FORMOTEROL FUMARATE DIHYDRATE 80; 4.5 UG/1; UG/1
80-4.5 AEROSOL RESPIRATORY (INHALATION)
Qty: 1 | Refills: 0 | Status: DISCONTINUED | COMMUNITY
Start: 2020-02-25 | End: 2020-03-16

## 2020-03-16 RX ORDER — METOPROLOL TARTRATE 50 MG/1
50 TABLET, FILM COATED ORAL
Qty: 60 | Refills: 0 | Status: ACTIVE | COMMUNITY

## 2020-03-24 NOTE — CHART NOTE - NSCHARTNOTEFT_GEN_A_CORE
40 y/o M with PMH of Type I DM on insulin pump for 32 years and DLD. Patient had recent admission for bacterial PNA treated with oral antibiotics 12/2019.  Patient had ct chest on that admission which demonstrated coronary calcifications prompting LHC. Patient presented for elective LHC demonstrating severe 3vCAD to LAD, cirx and RPDA w/preserved ef. He was admitted post catheterization for myocardial revascularization via CABG performed the following day. Post-operatively, patient had an uneventful hospital course. He was discharged home in stable condition on POD#4 with instructions to follow up with Dr. Jenkins.    Upon further chart review of pre-operative studies, patient's FEV1 of 715 on PFTs demonstrates a diagnosis of Mild COPD. 38 y/o M with PMH of Type I DM on insulin pump for 32 years and DLD. Patient had recent admission for bacterial PNA treated with oral antibiotics 12/2019.  Patient had ct chest on that admission which demonstrated coronary calcifications prompting LHC. Patient presented for elective LHC demonstrating severe 3vCAD to LAD, cirx and RPDA w/preserved ef. He was admitted post catheterization for myocardial revascularization via CABG performed the following day. Post-operatively, patient had an uneventful hospital course. He was discharged home in stable condition on POD#4 with instructions to follow up with Dr. Jenkins.    Upon further chart review of pre-operative studies, patient's FEV1 of 71% on PFTs demonstrates a diagnosis of Mild COPD.

## 2021-12-31 ENCOUNTER — TRANSCRIPTION ENCOUNTER (OUTPATIENT)
Age: 41
End: 2021-12-31

## 2022-09-06 ENCOUNTER — APPOINTMENT (OUTPATIENT)
Dept: ORTHOPEDIC SURGERY | Facility: CLINIC | Age: 42
End: 2022-09-06

## 2022-09-06 DIAGNOSIS — M89.512 OSTEOLYSIS, LEFT SHOULDER: ICD-10-CM

## 2022-09-06 PROCEDURE — 99213 OFFICE O/P EST LOW 20 MIN: CPT

## 2022-09-06 RX ORDER — MELOXICAM 15 MG/1
15 TABLET ORAL
Qty: 30 | Refills: 0 | Status: ACTIVE | COMMUNITY
Start: 2022-01-25

## 2022-09-06 RX ORDER — AFLIBERCEPT 40 MG/ML
2 INJECTION, SOLUTION INTRAVITREAL
Qty: 1 | Refills: 0 | Status: ACTIVE | COMMUNITY
Start: 2022-04-21

## 2022-09-06 RX ORDER — METOPROLOL TARTRATE 25 MG/1
25 TABLET, FILM COATED ORAL
Qty: 180 | Refills: 0 | Status: ACTIVE | COMMUNITY
Start: 2021-10-12

## 2022-09-06 NOTE — DISCUSSION/SUMMARY
[de-identified] :   The patient is not interested in surgery to address this.  I recommend he does home therapy exercises for the shoulder.  Rx for meloxicam sent to the pharmacy.  I will see him back in 2 months for further evaluation.\par \par Supervising physician:  Dr. Neville

## 2022-09-06 NOTE — PHYSICAL EXAM
[Left] : left shoulder [] : motor and sensory intact distally [FreeTextEntry3] :  there is a prominence noted over the AC joint.

## 2022-09-06 NOTE — HISTORY OF PRESENT ILLNESS
[de-identified] : The patient is a 42-year-old male here for subsequent re-evaluation of his left shoulder.  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  He is still having some pain in the shoulder.  The severity waxes and wanes.  He works as an  so he does a lot of overhead movement.  He has tried meloxicam which provided him with some relief.

## 2022-10-03 ENCOUNTER — RX RENEWAL (OUTPATIENT)
Age: 42
End: 2022-10-03

## 2022-10-03 RX ORDER — MELOXICAM 15 MG/1
15 TABLET ORAL
Qty: 30 | Refills: 0 | Status: ACTIVE | COMMUNITY
Start: 2022-09-06 | End: 1900-01-01

## 2022-10-12 NOTE — ED PROVIDER NOTE - DISPOSITION TYPE
DULoxetine (CYMBALTA) 20 MG capsule  Last Written Prescription Date:   9/7/2021  Last Fill Quantity: 90,   # refills: 1  Last Office Visit :  7/12/2022  Future Office visit:   10/31/2022    Routing refill request to provider for review/approval because:  Gaps in refills.   Last filled Sept 2021 with a 6 month supply.   Where is a refill for this last spring?    Refer to clinic for review       Nataly Hare RN  Central Triage Red Flags/Med Refills    
Pt no showed 2 appts with Dr. Castaneda in May and June 2022, refills can be discussed at next appt on 10/31/22.    Dorina Argueta RN on 10/12/2022 at 11:31 AM    
ADMIT

## 2022-10-25 ENCOUNTER — NON-APPOINTMENT (OUTPATIENT)
Age: 42
End: 2022-10-25

## 2022-11-07 ENCOUNTER — APPOINTMENT (OUTPATIENT)
Dept: ORTHOPEDIC SURGERY | Facility: CLINIC | Age: 42
End: 2022-11-07

## 2023-02-07 NOTE — PRE-ANESTHESIA EVALUATION ADULT - NSANTHDISPORD_GEN_ALL_CORE
Patient discharged home in stable condition. A&Ox4, gait steady. Discharge instructions as well as follow up discussed, patient verbalized understanding.    ICU

## 2023-04-15 ENCOUNTER — OUTPATIENT (OUTPATIENT)
Dept: OUTPATIENT SERVICES | Facility: HOSPITAL | Age: 43
LOS: 1 days | End: 2023-04-15
Payer: COMMERCIAL

## 2023-04-15 DIAGNOSIS — Z87.39 PERSONAL HISTORY OF OTHER DISEASES OF THE MUSCULOSKELETAL SYSTEM AND CONNECTIVE TISSUE: Chronic | ICD-10-CM

## 2023-04-15 DIAGNOSIS — Z00.8 ENCOUNTER FOR OTHER GENERAL EXAMINATION: ICD-10-CM

## 2023-04-15 DIAGNOSIS — R10.9 UNSPECIFIED ABDOMINAL PAIN: ICD-10-CM

## 2023-04-15 PROCEDURE — 76700 US EXAM ABDOM COMPLETE: CPT

## 2023-04-15 PROCEDURE — 76700 US EXAM ABDOM COMPLETE: CPT | Mod: 26

## 2023-04-16 DIAGNOSIS — R10.9 UNSPECIFIED ABDOMINAL PAIN: ICD-10-CM

## 2023-04-18 ENCOUNTER — TRANSCRIPTION ENCOUNTER (OUTPATIENT)
Age: 43
End: 2023-04-18

## 2023-08-06 ENCOUNTER — NON-APPOINTMENT (OUTPATIENT)
Age: 43
End: 2023-08-06

## 2023-10-08 ENCOUNTER — NON-APPOINTMENT (OUTPATIENT)
Age: 43
End: 2023-10-08

## 2024-02-05 ENCOUNTER — APPOINTMENT (OUTPATIENT)
Dept: ORTHOPEDIC SURGERY | Facility: CLINIC | Age: 44
End: 2024-02-05
Payer: COMMERCIAL

## 2024-02-05 DIAGNOSIS — M65.321 TRIGGER FINGER, RIGHT INDEX FINGER: ICD-10-CM

## 2024-02-05 PROCEDURE — 20550 NJX 1 TENDON SHEATH/LIGAMENT: CPT

## 2024-02-05 PROCEDURE — 99202 OFFICE O/P NEW SF 15 MIN: CPT | Mod: 25

## 2024-02-05 NOTE — ASSESSMENT
[FreeTextEntry1] : Patient has right index finger trigger digit.  Patient received cortisone injection today.  He tolerated well.  Will see how the injection does.  If it does not help him he will consider surgical intervention for trigger finger release.

## 2024-02-05 NOTE — HISTORY OF PRESENT ILLNESS
[de-identified] : 43-year-old male has a right index finger trigger digit.  Patient has had other trigger digits in the past.  Now he has 1 in the index finger.  Bothers him mainly in the morning.

## 2024-02-05 NOTE — PHYSICAL EXAM
[de-identified] : Patient is tender to palpation A1 pulley right index finger.  There is triggering present.  There is normal sensation normal cap refill.  There is no flexion contracture no Dupuytren's

## 2024-02-05 NOTE — PROCEDURE
[FreeTextEntry3] : Trigger finger injection was performed because of pain inflammation and stiffness Anesthesia: ethyl chloride sprayed topically Dexamethasone injection of 1cc 4mg/ml Lidocaine: An injection of Lidocaine 1% 1cc  Patient has tried OTC's including aspirin, Ibuprofen, Aleve etc or prescription NSAIDS, and/or exercises at home and/ or physical therapy without satisfactory response. After verbal consent using sterile preparation and technique. The risks, benefits, and alternatives to cortisone injection were explained in full to the patient. Risks outlined include but are not limited to infection, sepsis, bleeding, scarring, skin discoloration, temporary increase in pain, syncopal episode, failure to resolve symptoms, allergic reaction, symptom recurrence, and elevation of blood sugar in diabetics. Patient understood the risks. All questions were answered. After discussion of options, patient requested an injection. Oral informed consent was obtained and sterile prep was done of the injection site. Sterile technique was utilized for the procedure including the preparation of the solutions used for the injection. Patient tolerated the procedure well. Advised to ice the injection site this evening. Prep with ETOH  locally to site. Sterile technique used.  tendon sheath injected Right index finger flexor tendon sheath

## 2024-02-06 VITALS — WEIGHT: 180 LBS | BODY MASS INDEX: 25.77 KG/M2 | HEIGHT: 70 IN

## 2024-02-11 NOTE — PROGRESS NOTE ADULT - SUBJECTIVE AND OBJECTIVE BOX
LIZ ALVARADO  39y, Male  Allergy: No Known Allergies      CHIEF COMPLAINT: fever with productive cough (13 Dec 2019 07:55)      INTERVAL EVENTS/HPI  - No acute events overnight  - T(F): , Max: 98.4 (19 @ 15:05)  - Denies any worsening symptoms  - Tolerating medication  - WBC Count: 5.90 (19 @ 04:30)  WBC Count: 8.89 (19 @ 07:40)  - Creatinine, Serum: 0.7 (19 @ 04:30)  Creatinine, Serum: 0.8 (19 @ 07:40)       ROS  General: Denies rigors, nightsweats  HEENT: Denies headache, rhinorrhea, sore throat, eye pain  CV: Denies CP, palpitations  PULM: Denies wheezing, hemoptysis  GI: Denies hematemesis, hematochezia, melena  : Denies discharge, hematuria  MSK: Denies arthralgias, myalgias  SKIN: Denies rash, lesions  NEURO: Denies paresthesias, weakness  PSYCH: Denies depression, anxiety    VITALS:  T(F): 98.3, Max: 98.4 (19 @ 15:05)  HR: 86  BP: 117/68  RR: 18Vital Signs Last 24 Hrs  T(C): 36.8 (13 Dec 2019 07:11), Max: 36.9 (12 Dec 2019 15:05)  T(F): 98.3 (13 Dec 2019 07:11), Max: 98.4 (12 Dec 2019 15:05)  HR: 86 (13 Dec 2019 09:10) (80 - 100)  BP: 117/68 (13 Dec 2019 07:11) (115/66 - 121/56)  BP(mean): --  RR: 18 (13 Dec 2019 07:11) (18 - 20)  SpO2: 95% (13 Dec 2019 09:10) (95% - 98%)    PHYSICAL EXAM:  Gen: NAD, resting in bed  HEENT: Normocephalic, atraumatic, mucocele under tongue, oral ulcer   Neck: supple, no lymphadenopathy  CV: Coarse BS  Lungs: decreased BS at bases, no fremitus  Abdomen: Soft, BS present  Ext: Warm, well perfused  Neuro: non focal, awake  Skin: no rash, no erythema  Lines: no phlebitis      FH: Non-contributory  Social Hx: Non-contributory    TESTS & MEASUREMENTS:                        12.3   5.90  )-----------( 281      ( 13 Dec 2019 04:30 )             35.6         140  |  100  |  12  ----------------------------<  185<H>  4.1   |  26  |  0.7    Ca    9.0      13 Dec 2019 04:30  Mg     2.0         TPro  5.9<L>  /  Alb  3.2<L>  /  TBili  0.4  /  DBili  x   /  AST  18  /  ALT  30  /  AlkPhos  60      eGFR if Non African American: 120 mL/min/1.73M2 (19 @ 04:30)  eGFR if : 139 mL/min/1.73M2 (19 @ 04:30)    LIVER FUNCTIONS - ( 12 Dec 2019 07:40 )  Alb: 3.2 g/dL / Pro: 5.9 g/dL / ALK PHOS: 60 U/L / ALT: 30 U/L / AST: 18 U/L / GGT: x               Culture - Sputum (collected 12-10-19 @ 03:00)  Source: .Sputum Sputum  Gram Stain (12-10-19 @ 16:18):    Rare polymorphonuclear leukocytes per low power field    Rare Squamous epithelial cells per low power field    Few Gram Negative Rods per oil power field    Rare Gram Negative Diplococci per oil power field    Rare Gram Positive Cocci in Clusters per oil power field  Final Report (19 @ 07:47):    Normal Respiratory Smitha present    Culture - Urine (collected 12-10-19 @ 03:00)  Source: .Urine Clean Catch (Midstream)  Final Report (19 @ 11:37):    No growth    Culture - Urine (collected 19 @ 16:40)  Source: .Urine Clean Catch (Midstream)  Final Report (19 @ 07:22):    No growth    Culture - Blood (collected 19 @ 16:21)  Source: .Blood None  Final Report (19 @ 01:00):    No growth at 5 days.    Culture - Blood (collected 19 @ 16:21)  Source: .Blood None  Final Report (19 @ 01:00):    No growth at 5 days.    Culture - Sputum (collected 19 @ 16:15)  Source: .Sputum Sputum  Gram Stain (19 @ 06:49):    Norovirus GI/GII polymorphonuclear leukocytes per low power field    ***Add Quantity Code*** Squamous epithelial cells per low power field    Moderate Gram positive cocci in pairs seen per oil power field    Moderate Gram Negative Rods seen per oil powerfield  Final Report (19 @ 17:05):    Normal Respiratory Smitha present    Culture - Blood (collected 19 @ 10:52)  Source: .Blood Blood-Peripheral  Final Report (19 @ 19:01):    No growth at 5 days.    Culture - Blood (collected 19 @ 10:52)  Source: .Blood Blood-Peripheral  Final Report (19 @ 19:01):    No growth at 5 days.            INFECTIOUS DISEASES TESTING  Fungitell: <31 (12-10-19 @ 11:42)  Rapid RVP Result: NotDetec (12-10-19 @ 08:12)  MRSA PCR Result.: Negative (12-10-19 @ 06:00)  Streptococcus Pneumoniae Ag Urine: Negative (19 @ 16:40)  Legionella Antigen, Urine: Negative (19 @ 16:40)  HIV-1/2 Combo Result: Nonreact (19 @ 16:21)      RADIOLOGY & ADDITIONAL TESTS:  I have personally reviewed the last Chest xray  CXR      CT      CARDIOLOGY TESTING  Transthoracic Echocardiogram:    EXAM:  2-D ECHO (TTE) COMPLETE        PROCEDURE DATE:  2019      INTERPRETATION:  REPORT:    TRANSTHORACIC ECHOCARDIOGRAM REPORT         Patient Name:   LIZ ALVARADO Accession #: [id number]  Medical Rec #:  [id number]      Height:      70.0 in 177.8 cm  YOB: 1980      Weight:      190.0 lb 86.18 kg  Patient Age:    39 years       BSA:         2.04 m²  Patient Gender: M              BP:          129/60 mmHg       Date of Exam:        2019 12:50:55 PM  Referring Physician: [id number] SUE HERNANDEZ  Sonographer:         Chuyita Rouse  Reading Physician:   Clayton Veronica M.D.    Procedure:   2D Echo/Doppler/Color Doppler Complete.  Indications: R07.9 - Chest Pain, unspecified  Diagnosis:   Chest pain, unspecified - R07.9         Summary:   1. Left ventricular ejection fraction, by visual estimation, is 55 to   60%.   2. Mild mitral valve regurgitation.    PHYSICIAN INTERPRETATION:  Left Ventricle: Normal left ventricular size and wall thicknesses, with   normal systolic and diastolic function. Left ventricular ejection   fraction, by visual estimation, is 55 to 60%.  Right Ventricle: Normal right ventricular size and function.  Left Atrium: Normal left atrial size.  Right Atrium: Normal right atrial size.  Pericardium: There is no evidence of pericardial effusion.  Mitral Valve: Structurally normal mitral valve, with normal leaflet   excursion. The mitral valve is normal in structure. Mild mitral valve   regurgitation is seen.  Tricuspid Valve: Structurally normal tricuspid valve, with normal leaflet   excursion. The tricuspid valve is normal in structure. Mild tricuspid   regurgitation is visualized.  Aortic Valve: Normal trileaflet aortic valve with normal opening. The   aortic valve is normal. No evidence of aortic valve regurgitation is seen.  Pulmonic Valve: Structurally normal pulmonic valve, with normal leaflet   excursion. The pulmonic valve is normal. Mild pulmonic valve   regurgitation.  Aorta: The aortic root and ascending aorta are structurally normal, with   no evidence of dilitation.  Pulmonary Artery: The main pulmonary artery is normal in size.       2D AND M-MODE MEASUREMENTS (normal ranges within parentheses):  Left                  Normal   Aorta/Left             Normal  Ventricle:                     Atrium:  IVSd (2D):  0.87 cm  (0.7-1.1) AoV Cusp       1.85  (1.5-2.6)  LVPWd       0.89 cm  (0.7-1.1) Separation:     cm  (2D):                          Left Atrium    2.77  (1.9-4.0)  LVIDd       4.48 cm  (3.4-5.7) (Mmode):        cm  (2D):                          LA Volume      22.2  LVIDs       3.48 cm            Index         ml/m²  (2D):                          Right  LV FS       22.3 %    (>25%)   Ventricle:  (2D):                          RVd (2D):      2.93 cm  IVSd        0.90 cm  (0.7-1.1)  (Mmode):  LVPWd       1.02 cm  (0.7-1.1)  (Mmode):  LVIDd       4.39 cm  (3.4-5.7)  (Mmode):  LVIDs       3.37 cm  (Mmode):  LV FS       23.2 %    (>25%)  (Mmode):  Relative     0.40     (<0.42)  Wall  Thickness  Rel.Wall    0.46     (<0.42)  Thickness  Mm  LV Mass    67.9 g/m²  Index:  Mmode    SPECTRAL DOPPLER ANALYSIS:  LV DIASTOLIC FUNCTION:  MV Peak E: 0.88 m/s Decel Time: 153 msec  MV Peak A: 0.66 m/s  E/A Ratio: 1.34    Aortic Valve:  AoV VMax:    1.50 m/sAoV Area, Vmax: 2.53 cm² Vmax Indx: 1.24 cm²/m²  AoV Pk Grad: 8.9 mmHg    LVOT Vmax: 0.99 m/s  LVOT VTI:  0.18 m  LVOT Diam: 2.21 cm    Mitral Valve:  MV P1/2 Time: 44.40 msec  MV Area, PHT: 4.96 cm²    Tricuspid Valve and PA/RV Systolic Pressure: TRMax Velocity: 2.54 m/s RA   Pressure: 5 mmHg RVSP/PASP: 30.9 mmHg    Pulmonic Valve:  PV Max Velocity: 1.27 m/s PV Max P.4 mmHg PV Mean PG:       [id number] Clayton Veronica M.D., Electronically signed on 2019 at 3:14:58   PM              *** Final ***                    CLAYTON VERONICA MD  This document has been electronically signed. Dec 12 2019 12:50PM             (19 @ 12:50)  12 Lead ECG:   Ventricular Rate 126 BPM    Atrial Rate 126 BPM    P-R Interval 150 ms    QRS Duration 88 ms    Q-T Interval 292 ms    QTC Calculation(Bezet) 422 ms    P Axis 48 degrees    R Axis 43 degrees    T Axis 45 degrees    Diagnosis Line Sinus tachycardia  Otherwise normal ECG    Confirmed by MARY GILBERT, ESTELLA ([id number]) on 2019 1:52:13 PM (19 @ 11:23)      MEDICATIONS  ALBUTerol    90 MICROgram(s) HFA Inhaler 1  albuterol/ipratropium for Nebulization 3  atorvastatin 10  benzocaine 15 mG/menthol 3.6 mG (Sugar-Free) Lozenge 1  cefepime   IVPB 2000  chlorhexidine 4% Liquid 1  enoxaparin Injectable 40  tiotropium 18 MICROgram(s) Capsule 1      ANTIBIOTICS:  cefepime   IVPB 2000 milliGRAM(s) IV Intermittent every 8 hours      All available historical records have been reviewed - - -

## 2024-04-10 NOTE — DISCHARGE NOTE NURSING/CASE MANAGEMENT/SOCIAL WORK - NSDCFUADDAPPT_GEN_ALL_CORE_FT
comfortable appearance/cooperative Follow up with Dr. Marco A Mota  [address] [facility]   [phone] (call to make an appointment, walk-ins Tuesdays 10:30 AM)  Fax [phone]

## 2024-08-07 ENCOUNTER — OUTPATIENT (OUTPATIENT)
Dept: OUTPATIENT SERVICES | Facility: HOSPITAL | Age: 44
LOS: 1 days | Discharge: HOME | End: 2024-08-07
Payer: COMMERCIAL

## 2024-08-07 ENCOUNTER — APPOINTMENT (OUTPATIENT)
Dept: ORTHOPEDIC SURGERY | Facility: HOSPITAL | Age: 44
End: 2024-08-07

## 2024-08-07 ENCOUNTER — TRANSCRIPTION ENCOUNTER (OUTPATIENT)
Age: 44
End: 2024-08-07

## 2024-08-07 VITALS
RESPIRATION RATE: 18 BRPM | SYSTOLIC BLOOD PRESSURE: 138 MMHG | WEIGHT: 175.05 LBS | OXYGEN SATURATION: 100 % | TEMPERATURE: 97 F | HEIGHT: 70 IN | DIASTOLIC BLOOD PRESSURE: 79 MMHG | HEART RATE: 71 BPM

## 2024-08-07 VITALS — RESPIRATION RATE: 18 BRPM | DIASTOLIC BLOOD PRESSURE: 70 MMHG | HEART RATE: 67 BPM | SYSTOLIC BLOOD PRESSURE: 115 MMHG

## 2024-08-07 DIAGNOSIS — Z79.4 LONG TERM (CURRENT) USE OF INSULIN: ICD-10-CM

## 2024-08-07 DIAGNOSIS — E11.9 TYPE 2 DIABETES MELLITUS WITHOUT COMPLICATIONS: ICD-10-CM

## 2024-08-07 DIAGNOSIS — M65.321 TRIGGER FINGER, RIGHT INDEX FINGER: ICD-10-CM

## 2024-08-07 DIAGNOSIS — I25.10 ATHEROSCLEROTIC HEART DISEASE OF NATIVE CORONARY ARTERY WITHOUT ANGINA PECTORIS: ICD-10-CM

## 2024-08-07 DIAGNOSIS — E78.5 HYPERLIPIDEMIA, UNSPECIFIED: ICD-10-CM

## 2024-08-07 DIAGNOSIS — Z95.1 PRESENCE OF AORTOCORONARY BYPASS GRAFT: Chronic | ICD-10-CM

## 2024-08-07 DIAGNOSIS — Z95.1 PRESENCE OF AORTOCORONARY BYPASS GRAFT: ICD-10-CM

## 2024-08-07 DIAGNOSIS — Z79.82 LONG TERM (CURRENT) USE OF ASPIRIN: ICD-10-CM

## 2024-08-07 DIAGNOSIS — Z87.39 PERSONAL HISTORY OF OTHER DISEASES OF THE MUSCULOSKELETAL SYSTEM AND CONNECTIVE TISSUE: Chronic | ICD-10-CM

## 2024-08-07 PROCEDURE — 26055 INCISE FINGER TENDON SHEATH: CPT | Mod: F6

## 2024-08-07 PROCEDURE — 82962 GLUCOSE BLOOD TEST: CPT

## 2024-08-07 RX ORDER — IBUPROFEN 200 MG
1 TABLET ORAL
Qty: 20 | Refills: 0
Start: 2024-08-07

## 2024-08-07 NOTE — ASU PATIENT PROFILE, ADULT - FALL HARM RISK - HARM RISK INTERVENTIONS

## 2024-08-07 NOTE — ASU DISCHARGE PLAN (ADULT/PEDIATRIC) - ASU DC SPECIAL INSTRUCTIONSFT

## 2024-08-15 ENCOUNTER — APPOINTMENT (OUTPATIENT)
Dept: ORTHOPEDIC SURGERY | Facility: CLINIC | Age: 44
End: 2024-08-15
Payer: COMMERCIAL

## 2024-08-15 ENCOUNTER — NON-APPOINTMENT (OUTPATIENT)
Age: 44
End: 2024-08-15

## 2024-08-15 DIAGNOSIS — M65.321 TRIGGER FINGER, RIGHT INDEX FINGER: ICD-10-CM

## 2024-08-15 PROCEDURE — 99024 POSTOP FOLLOW-UP VISIT: CPT

## 2024-08-15 NOTE — PHYSICAL EXAM
[de-identified] :   Physical exam of his right index finger:  Resolving swelling and ecchymosis.  The wound is clean and dry.  No signs of drainage, pus or infection. Good motion of the digits.

## 2024-08-15 NOTE — HISTORY OF PRESENT ILLNESS
[de-identified] : Patient is a 43 year M here for his first PO appt. He is status post a right index TFR done by Dr. Hollis. He is doing well.

## 2024-08-15 NOTE — DISCUSSION/SUMMARY
[de-identified] : Today I removed the sutures.   The patient will work on range of motion and scar massage.   Swelling of the digit can linger for 4-6 weeks.  Follow up prn if there is continued pain and/or swelling at that time.

## 2025-04-22 NOTE — PATIENT PROFILE ADULT - NSPROPTRIGHTNOTIFY_GEN_A_NUR
Refill Decision Note   Misjoan Hughesjuany  is requesting a refill authorization.  Brief Assessment and Rationale for Refill:  Approve     Medication Therapy Plan:         Comments:     Note composed:12:02 PM 04/22/2025                declines